# Patient Record
Sex: FEMALE | Race: WHITE | Employment: OTHER | URBAN - METROPOLITAN AREA
[De-identification: names, ages, dates, MRNs, and addresses within clinical notes are randomized per-mention and may not be internally consistent; named-entity substitution may affect disease eponyms.]

---

## 2017-01-08 ENCOUNTER — HOSPITAL ENCOUNTER (EMERGENCY)
Age: 67
Discharge: HOME | End: 2017-01-08
Attending: EMERGENCY MEDICINE
Payer: MEDICARE

## 2017-01-08 ENCOUNTER — APPOINTMENT (OUTPATIENT)
Dept: CT IMAGING | Age: 67
End: 2017-01-08
Attending: EMERGENCY MEDICINE
Payer: MEDICARE

## 2017-01-08 VITALS
HEART RATE: 75 BPM | SYSTOLIC BLOOD PRESSURE: 132 MMHG | TEMPERATURE: 98.2 F | RESPIRATION RATE: 16 BRPM | BODY MASS INDEX: 33.86 KG/M2 | HEIGHT: 62 IN | DIASTOLIC BLOOD PRESSURE: 75 MMHG | OXYGEN SATURATION: 95 % | WEIGHT: 184 LBS

## 2017-01-08 DIAGNOSIS — R10.9 ACUTE LEFT FLANK PAIN: Primary | ICD-10-CM

## 2017-01-08 LAB
ANION GAP BLD CALC-SCNC: 4 MMOL/L (ref 7–16)
BACTERIA URNS QL MICRO: ABNORMAL /HPF
BASOPHILS # BLD AUTO: 0 K/UL (ref 0–0.2)
BASOPHILS # BLD: 0 % (ref 0–2)
BUN SERPL-MCNC: 15 MG/DL (ref 8–23)
CALCIUM SERPL-MCNC: 8.7 MG/DL (ref 8.3–10.4)
CASTS URNS QL MICRO: 0 /LPF
CHLORIDE SERPL-SCNC: 107 MMOL/L (ref 98–107)
CO2 SERPL-SCNC: 28 MMOL/L (ref 21–32)
CREAT SERPL-MCNC: 0.86 MG/DL (ref 0.6–1)
CRYSTALS URNS QL MICRO: 0 /LPF
DIFFERENTIAL METHOD BLD: ABNORMAL
EOSINOPHIL # BLD: 0 K/UL (ref 0–0.8)
EOSINOPHIL NFR BLD: 1 % (ref 0.5–7.8)
EPI CELLS #/AREA URNS HPF: ABNORMAL /HPF
ERYTHROCYTE [DISTWIDTH] IN BLOOD BY AUTOMATED COUNT: 13.9 % (ref 11.9–14.6)
GLUCOSE SERPL-MCNC: 96 MG/DL (ref 65–100)
HCT VFR BLD AUTO: 40.2 % (ref 35.8–46.3)
HGB BLD-MCNC: 13 G/DL (ref 11.7–15.4)
IMM GRANULOCYTES # BLD: 0 K/UL (ref 0–0.5)
IMM GRANULOCYTES NFR BLD AUTO: 0.2 % (ref 0–5)
LYMPHOCYTES # BLD AUTO: 14 % (ref 13–44)
LYMPHOCYTES # BLD: 0.9 K/UL (ref 0.5–4.6)
MCH RBC QN AUTO: 29.5 PG (ref 26.1–32.9)
MCHC RBC AUTO-ENTMCNC: 32.3 G/DL (ref 31.4–35)
MCV RBC AUTO: 91.2 FL (ref 79.6–97.8)
MONOCYTES # BLD: 0.2 K/UL (ref 0.1–1.3)
MONOCYTES NFR BLD AUTO: 3 % (ref 4–12)
MUCOUS THREADS URNS QL MICRO: ABNORMAL /LPF
NEUTS SEG # BLD: 5.2 K/UL (ref 1.7–8.2)
NEUTS SEG NFR BLD AUTO: 82 % (ref 43–78)
PLATELET # BLD AUTO: 249 K/UL (ref 150–450)
PMV BLD AUTO: 11.9 FL (ref 10.8–14.1)
POTASSIUM SERPL-SCNC: 5.3 MMOL/L (ref 3.5–5.1)
RBC # BLD AUTO: 4.41 M/UL (ref 4.05–5.25)
RBC #/AREA URNS HPF: ABNORMAL /HPF
SODIUM SERPL-SCNC: 139 MMOL/L (ref 136–145)
WBC # BLD AUTO: 6.4 K/UL (ref 4.3–11.1)
WBC URNS QL MICRO: ABNORMAL /HPF
YEAST URNS QL MICRO: ABNORMAL

## 2017-01-08 PROCEDURE — 74176 CT ABD & PELVIS W/O CONTRAST: CPT

## 2017-01-08 PROCEDURE — 99284 EMERGENCY DEPT VISIT MOD MDM: CPT | Performed by: EMERGENCY MEDICINE

## 2017-01-08 PROCEDURE — 81003 URINALYSIS AUTO W/O SCOPE: CPT | Performed by: EMERGENCY MEDICINE

## 2017-01-08 PROCEDURE — 80048 BASIC METABOLIC PNL TOTAL CA: CPT | Performed by: EMERGENCY MEDICINE

## 2017-01-08 PROCEDURE — 85025 COMPLETE CBC W/AUTO DIFF WBC: CPT | Performed by: EMERGENCY MEDICINE

## 2017-01-08 PROCEDURE — 81015 MICROSCOPIC EXAM OF URINE: CPT | Performed by: EMERGENCY MEDICINE

## 2017-01-08 RX ORDER — TRAMADOL HYDROCHLORIDE 50 MG/1
50-100 TABLET ORAL
Qty: 11 TAB | Refills: 0 | Status: SHIPPED | OUTPATIENT
Start: 2017-01-08 | End: 2018-06-14

## 2017-01-08 NOTE — ED NOTES
I have reviewed discharge instructions with the patient. The patient verbalized understanding. Ambulatory at discharge gait steady. Given 1 script. Opportunity for questions and clarification provided.

## 2017-01-08 NOTE — DISCHARGE INSTRUCTIONS
As we discussed, we did not have an exact cause of her symptoms today in the emergency department. Therefore, it is important for you to follow up with your primary care doctor for reevaluation. Please return to the emergency department with any fevers, vomiting, bloody or bowels, worsening symptoms, or additional concerns.

## 2017-01-08 NOTE — ED PROVIDER NOTES
HPI Comments: 70-year-old lady with a history of pain in her left flank. Patient says that pain has been present for not quite awake but is gotten significantly worse in the last 24 hours. Patient says she has no known history of kidney stones and no history of significant kidney infections. She notes she does have a remote history of interstitial cystitis but says she has not had any difficulties with that in a few years. She says that with this pain she had no nausea or vomiting. No urinary or stool or blood in her bowels or urine. Overall she rates her pain as an 8 out of 10. Elements of this note were created using speech recognition software. As such, there may be errors of speech recognition present. Patient is a 78 y.o. female presenting with flank pain. The history is provided by the patient. Flank Pain    Pertinent negatives include no chest pain, no fever, no headaches, no abdominal pain, no dysuria and no weakness.         Past Medical History:   Diagnosis Date    Arthritis     Atrial fibrillation      AF despite TIkosyn; AF ablation 9/2013; Recurrent AF-AV tasia ablation 8/2015    Bradycardia     DM (diabetes mellitus) ([facility] [address] 11/3/2015    GERD (gastroesophageal reflux disease)     Heart failure (HCC)      h/o EF <35%, now improved to 55%    History of implantable cardioverter-defibrillator (ICD) placement      [employer] BiV ICD    HLD (hyperlipidemia)     Hypertension     ICD (implantable cardioverter-defibrillator), biventricular, in situ      Dual-chamber ICD in [facility] 2011; RA/RV lead revision/upgrade to BiV 3/2013    Obesity      BMI 33    Psychiatric disorder      anxiety    PVD (peripheral vascular disease) ([facility] [address] 11/3/2015    Syncope and collapse     Vertigo        Past Surgical History:   Procedure Laterality Date    Hx cholecystectomy      Hx tonsillectomy      Hx hysterectomy      Hx pacemaker      Hx implantable cardioverter defibrillator           Family History: Problem Relation Age of Onset    Heart Disease Father      MI at 48yrs    No Known Problems Sister     No Known Problems Brother     No Known Problems Brother        Social History     Social History    Marital status:      Spouse name: N/A    Number of children: N/A    Years of education: N/A     Occupational History    Not on file. Social History Main Topics    Smoking status: Never Smoker    Smokeless tobacco: Never Used    Alcohol use No    Drug use: No    Sexual activity: Not on file     Other Topics Concern    Not on file     Social History Narrative         ALLERGIES: Darvocet a500 [propoxyphene n-acetaminophen] and Zithromax [azithromycin]    Review of Systems   Constitutional: Negative for chills, diaphoresis and fever. HENT: Negative for congestion, rhinorrhea and sore throat. Eyes: Negative for redness and visual disturbance. Respiratory: Negative for cough, chest tightness, shortness of breath and wheezing. Cardiovascular: Negative for chest pain and palpitations. Gastrointestinal: Negative for abdominal pain, blood in stool, diarrhea, nausea and vomiting. Endocrine: Negative for polydipsia and polyuria. Genitourinary: Positive for flank pain. Negative for dysuria and hematuria. Musculoskeletal: Negative for arthralgias, myalgias and neck stiffness. Skin: Negative for rash. Allergic/Immunologic: Negative for environmental allergies and food allergies. Neurological: Negative for dizziness, weakness and headaches. Hematological: Negative for adenopathy. Does not bruise/bleed easily. Psychiatric/Behavioral: Negative for confusion and sleep disturbance. The patient is not nervous/anxious. Vitals:    01/08/17 1322   BP: 134/70   Pulse: 79   Resp: 18   Temp: 98 °F (36.7 °C)   SpO2: 95%   Weight: 83.5 kg (184 lb)   Height: 5' 2\" (1.575 m)            Physical Exam   Constitutional: She is oriented to person, place, and time.  She appears well-developed and well-nourished. HENT:   Head: Normocephalic and atraumatic. Eyes: Conjunctivae and EOM are normal. Pupils are equal, round, and reactive to light. Neck: Normal range of motion. Cardiovascular: Normal rate and regular rhythm. Pulmonary/Chest: Effort normal and breath sounds normal. No respiratory distress. She has no wheezes. She has no rales. She exhibits no tenderness. Abdominal: Soft. Bowel sounds are normal. There is no rebound and no guarding. Musculoskeletal: Normal range of motion. She exhibits no edema or tenderness. Lymphadenopathy:     She has no cervical adenopathy. Neurological: She is alert and oriented to person, place, and time. Skin: Skin is warm and dry. Psychiatric: She has a normal mood and affect. Nursing note and vitals reviewed. MDM  Number of Diagnoses or Management Options  Diagnosis management comments: I will get a CT scan of her abdomen to look for evidence of a kidney stone or kidney problems. We'll also check some basic blood work to check her kidney function. CT, urine, and blood work are unremarkable. I do not think that very slight potassium elevation is clinically relevant. I will plan to discharge the patient home with some pain medicine and I encouraged her to follow up with her primary care doctor.     ED Course       Procedures

## 2017-03-06 ENCOUNTER — ANESTHESIA EVENT (OUTPATIENT)
Dept: SURGERY | Age: 67
End: 2017-03-06
Payer: MEDICARE

## 2017-03-06 RX ORDER — NALOXONE HYDROCHLORIDE 0.4 MG/ML
0.1 INJECTION, SOLUTION INTRAMUSCULAR; INTRAVENOUS; SUBCUTANEOUS AS NEEDED
Status: CANCELLED | OUTPATIENT
Start: 2017-03-06

## 2017-03-06 RX ORDER — SODIUM CHLORIDE 0.9 % (FLUSH) 0.9 %
5-10 SYRINGE (ML) INJECTION AS NEEDED
Status: CANCELLED | OUTPATIENT
Start: 2017-03-06

## 2017-03-06 RX ORDER — OXYCODONE HYDROCHLORIDE 5 MG/1
5 TABLET ORAL
Status: CANCELLED | OUTPATIENT
Start: 2017-03-06

## 2017-03-06 RX ORDER — HYDROMORPHONE HYDROCHLORIDE 2 MG/ML
0.5 INJECTION, SOLUTION INTRAMUSCULAR; INTRAVENOUS; SUBCUTANEOUS
Status: CANCELLED | OUTPATIENT
Start: 2017-03-06

## 2017-03-06 NOTE — PROGRESS NOTES
Patient pre-assessment complete for Kelly Cannon  scheduled for Bi V ICD gen chane, arrival time 0800 am. Patient verified using . Patient instructed to bring all home medications in labeled bottles on the day of procedure. NPO status reinforced. . Patient instructed to HOLD Coumadin. Instructed they can take all other medications excluding vitamins & supplements. Patient verbalizes understanding of all instructions & denies any questions at this time.

## 2017-03-07 ENCOUNTER — HOSPITAL ENCOUNTER (OUTPATIENT)
Age: 67
Setting detail: OUTPATIENT SURGERY
Discharge: HOME | End: 2017-03-07
Attending: INTERNAL MEDICINE | Admitting: INTERNAL MEDICINE
Payer: MEDICARE

## 2017-03-07 ENCOUNTER — SURGERY (OUTPATIENT)
Age: 67
End: 2017-03-07

## 2017-03-07 ENCOUNTER — ANESTHESIA (OUTPATIENT)
Dept: SURGERY | Age: 67
End: 2017-03-07
Payer: MEDICARE

## 2017-03-07 ENCOUNTER — APPOINTMENT (OUTPATIENT)
Dept: CARDIAC CATH/INVASIVE PROCEDURES | Age: 67
End: 2017-03-07
Payer: MEDICARE

## 2017-03-07 VITALS
BODY MASS INDEX: 33.13 KG/M2 | DIASTOLIC BLOOD PRESSURE: 72 MMHG | OXYGEN SATURATION: 97 % | TEMPERATURE: 97.6 F | HEIGHT: 62 IN | SYSTOLIC BLOOD PRESSURE: 145 MMHG | RESPIRATION RATE: 9 BRPM | WEIGHT: 180 LBS | HEART RATE: 60 BPM

## 2017-03-07 LAB
ANION GAP BLD CALC-SCNC: 11 MMOL/L (ref 7–16)
ATRIAL RATE: 66 BPM
BUN SERPL-MCNC: 9 MG/DL (ref 8–23)
CALCIUM SERPL-MCNC: 8.8 MG/DL (ref 8.3–10.4)
CALCULATED P AXIS, ECG09: 62 DEGREES
CALCULATED R AXIS, ECG10: -90 DEGREES
CALCULATED T AXIS, ECG11: 61 DEGREES
CHLORIDE SERPL-SCNC: 104 MMOL/L (ref 98–107)
CO2 SERPL-SCNC: 27 MMOL/L (ref 21–32)
CREAT SERPL-MCNC: 0.73 MG/DL (ref 0.6–1)
DIAGNOSIS, 93000: NORMAL
DIASTOLIC BP, ECG02: NORMAL MMHG
ERYTHROCYTE [DISTWIDTH] IN BLOOD BY AUTOMATED COUNT: 14.3 % (ref 11.9–14.6)
GLUCOSE SERPL-MCNC: 99 MG/DL (ref 65–100)
HCT VFR BLD AUTO: 38.3 % (ref 35.8–46.3)
HGB BLD-MCNC: 12.4 G/DL (ref 11.7–15.4)
INR PPP: 1.2 (ref 0.9–1.2)
MAGNESIUM SERPL-MCNC: 2 MG/DL (ref 1.8–2.4)
MCH RBC QN AUTO: 29.7 PG (ref 26.1–32.9)
MCHC RBC AUTO-ENTMCNC: 32.4 G/DL (ref 31.4–35)
MCV RBC AUTO: 91.8 FL (ref 79.6–97.8)
P-R INTERVAL, ECG05: 176 MS
PLATELET # BLD AUTO: 232 K/UL (ref 150–450)
PMV BLD AUTO: 11.2 FL (ref 10.8–14.1)
POTASSIUM SERPL-SCNC: 4.2 MMOL/L (ref 3.5–5.1)
PROTHROMBIN TIME: 12.7 SEC (ref 9.6–12)
Q-T INTERVAL, ECG07: 474 MS
QRS DURATION, ECG06: 128 MS
QTC CALCULATION (BEZET), ECG08: 496 MS
RBC # BLD AUTO: 4.17 M/UL (ref 4.05–5.25)
SODIUM SERPL-SCNC: 142 MMOL/L (ref 136–145)
SYSTOLIC BP, ECG01: NORMAL MMHG
VENTRICULAR RATE, ECG03: 66 BPM
WBC # BLD AUTO: 6.3 K/UL (ref 4.3–11.1)

## 2017-03-07 PROCEDURE — 74011250636 HC RX REV CODE- 250/636: Performed by: ANESTHESIOLOGY

## 2017-03-07 PROCEDURE — 77030012935 HC DRSG AQUACEL BMS -B

## 2017-03-07 PROCEDURE — 93641 EP EVL 1/2CHMB PAC CVDFB TST: CPT

## 2017-03-07 PROCEDURE — 74011250636 HC RX REV CODE- 250/636: Performed by: INTERNAL MEDICINE

## 2017-03-07 PROCEDURE — 93005 ELECTROCARDIOGRAM TRACING: CPT | Performed by: INTERNAL MEDICINE

## 2017-03-07 PROCEDURE — 74011250636 HC RX REV CODE- 250/636

## 2017-03-07 PROCEDURE — 74011000250 HC RX REV CODE- 250: Performed by: INTERNAL MEDICINE

## 2017-03-07 PROCEDURE — 74011000272 HC RX REV CODE- 272: Performed by: INTERNAL MEDICINE

## 2017-03-07 PROCEDURE — 85610 PROTHROMBIN TIME: CPT | Performed by: INTERNAL MEDICINE

## 2017-03-07 PROCEDURE — 77030028698 HC BLD TISS PLSM MEDT -D

## 2017-03-07 PROCEDURE — 76060000032 HC ANESTHESIA 0.5 TO 1 HR: Performed by: INTERNAL MEDICINE

## 2017-03-07 PROCEDURE — 85027 COMPLETE CBC AUTOMATED: CPT | Performed by: INTERNAL MEDICINE

## 2017-03-07 PROCEDURE — 83735 ASSAY OF MAGNESIUM: CPT | Performed by: INTERNAL MEDICINE

## 2017-03-07 PROCEDURE — 77030031139 HC SUT VCRL2 J&J -A

## 2017-03-07 PROCEDURE — 74011000250 HC RX REV CODE- 250

## 2017-03-07 PROCEDURE — 33264 RMVL & RPLCMT DFB GEN MLT LD: CPT

## 2017-03-07 PROCEDURE — 77030008467 HC STPLR SKN COVD -B

## 2017-03-07 PROCEDURE — C1882 AICD, OTHER THAN SING/DUAL: HCPCS

## 2017-03-07 PROCEDURE — 80048 BASIC METABOLIC PNL TOTAL CA: CPT | Performed by: INTERNAL MEDICINE

## 2017-03-07 RX ORDER — PROPOFOL 10 MG/ML
INJECTION, EMULSION INTRAVENOUS
Status: DISCONTINUED | OUTPATIENT
Start: 2017-03-07 | End: 2017-03-07 | Stop reason: HOSPADM

## 2017-03-07 RX ORDER — FAMOTIDINE 20 MG/1
20 TABLET, FILM COATED ORAL ONCE
Status: DISCONTINUED | OUTPATIENT
Start: 2017-03-07 | End: 2017-03-07 | Stop reason: HOSPADM

## 2017-03-07 RX ORDER — LIDOCAINE HYDROCHLORIDE 10 MG/ML
0.1 INJECTION INFILTRATION; PERINEURAL AS NEEDED
Status: DISCONTINUED | OUTPATIENT
Start: 2017-03-07 | End: 2017-03-07 | Stop reason: HOSPADM

## 2017-03-07 RX ORDER — SODIUM CHLORIDE, SODIUM LACTATE, POTASSIUM CHLORIDE, CALCIUM CHLORIDE 600; 310; 30; 20 MG/100ML; MG/100ML; MG/100ML; MG/100ML
100 INJECTION, SOLUTION INTRAVENOUS CONTINUOUS
Status: DISCONTINUED | OUTPATIENT
Start: 2017-03-07 | End: 2017-03-07 | Stop reason: HOSPADM

## 2017-03-07 RX ORDER — MIDAZOLAM HYDROCHLORIDE 1 MG/ML
2 INJECTION, SOLUTION INTRAMUSCULAR; INTRAVENOUS
Status: DISCONTINUED | OUTPATIENT
Start: 2017-03-07 | End: 2017-03-07 | Stop reason: HOSPADM

## 2017-03-07 RX ORDER — SODIUM CHLORIDE 9 MG/ML
25 INJECTION, SOLUTION INTRAVENOUS CONTINUOUS
Status: DISCONTINUED | OUTPATIENT
Start: 2017-03-07 | End: 2017-03-07 | Stop reason: HOSPADM

## 2017-03-07 RX ORDER — PROPOFOL 10 MG/ML
INJECTION, EMULSION INTRAVENOUS AS NEEDED
Status: DISCONTINUED | OUTPATIENT
Start: 2017-03-07 | End: 2017-03-07 | Stop reason: HOSPADM

## 2017-03-07 RX ORDER — LIDOCAINE HYDROCHLORIDE 20 MG/ML
INJECTION, SOLUTION EPIDURAL; INFILTRATION; INTRACAUDAL; PERINEURAL AS NEEDED
Status: DISCONTINUED | OUTPATIENT
Start: 2017-03-07 | End: 2017-03-07

## 2017-03-07 RX ORDER — CEFAZOLIN SODIUM IN 0.9 % NACL 2 G/50 ML
2 INTRAVENOUS SOLUTION, PIGGYBACK (ML) INTRAVENOUS ONCE
Status: COMPLETED | OUTPATIENT
Start: 2017-03-07 | End: 2017-03-07

## 2017-03-07 RX ORDER — LIDOCAINE HYDROCHLORIDE 10 MG/ML
40 INJECTION INFILTRATION; PERINEURAL ONCE
Status: COMPLETED | OUTPATIENT
Start: 2017-03-07 | End: 2017-03-07

## 2017-03-07 RX ORDER — FENTANYL CITRATE 50 UG/ML
100 INJECTION, SOLUTION INTRAMUSCULAR; INTRAVENOUS ONCE
Status: DISCONTINUED | OUTPATIENT
Start: 2017-03-07 | End: 2017-03-07 | Stop reason: HOSPADM

## 2017-03-07 RX ORDER — LORAZEPAM 0.5 MG/1
TABLET ORAL
COMMUNITY

## 2017-03-07 RX ORDER — SODIUM CHLORIDE, SODIUM LACTATE, POTASSIUM CHLORIDE, CALCIUM CHLORIDE 600; 310; 30; 20 MG/100ML; MG/100ML; MG/100ML; MG/100ML
75 INJECTION, SOLUTION INTRAVENOUS CONTINUOUS
Status: DISCONTINUED | OUTPATIENT
Start: 2017-03-07 | End: 2017-03-07 | Stop reason: HOSPADM

## 2017-03-07 RX ORDER — SODIUM CHLORIDE 0.9 % (FLUSH) 0.9 %
5-10 SYRINGE (ML) INJECTION AS NEEDED
Status: DISCONTINUED | OUTPATIENT
Start: 2017-03-07 | End: 2017-03-07 | Stop reason: HOSPADM

## 2017-03-07 RX ORDER — SODIUM CHLORIDE 0.9 % (FLUSH) 0.9 %
5-10 SYRINGE (ML) INJECTION EVERY 8 HOURS
Status: DISCONTINUED | OUTPATIENT
Start: 2017-03-07 | End: 2017-03-07 | Stop reason: HOSPADM

## 2017-03-07 RX ORDER — LIDOCAINE HYDROCHLORIDE 20 MG/ML
INJECTION, SOLUTION EPIDURAL; INFILTRATION; INTRACAUDAL; PERINEURAL AS NEEDED
Status: DISCONTINUED | OUTPATIENT
Start: 2017-03-07 | End: 2017-03-07 | Stop reason: HOSPADM

## 2017-03-07 RX ADMIN — PROPOFOL 100 MCG/KG/MIN: 10 INJECTION, EMULSION INTRAVENOUS at 13:08

## 2017-03-07 RX ADMIN — CEFAZOLIN 2 G: 1 INJECTION, POWDER, FOR SOLUTION INTRAMUSCULAR; INTRAVENOUS; PARENTERAL at 13:00

## 2017-03-07 RX ADMIN — CEFAZOLIN 2 G: 1 INJECTION, POWDER, FOR SOLUTION INTRAMUSCULAR; INTRAVENOUS; PARENTERAL at 16:00

## 2017-03-07 RX ADMIN — NEOMYCIN AND POLYMYXIN B SULFATES: 40; 200000 IRRIGANT IRRIGATION at 09:00

## 2017-03-07 RX ADMIN — LIDOCAINE HYDROCHLORIDE 40 ML: 10 INJECTION, SOLUTION INFILTRATION; PERINEURAL at 09:00

## 2017-03-07 RX ADMIN — PROPOFOL 50 MG: 10 INJECTION, EMULSION INTRAVENOUS at 13:07

## 2017-03-07 RX ADMIN — PROPOFOL 30 MG: 10 INJECTION, EMULSION INTRAVENOUS at 13:16

## 2017-03-07 RX ADMIN — SODIUM CHLORIDE, SODIUM LACTATE, POTASSIUM CHLORIDE, AND CALCIUM CHLORIDE: 600; 310; 30; 20 INJECTION, SOLUTION INTRAVENOUS at 13:00

## 2017-03-07 RX ADMIN — LIDOCAINE HYDROCHLORIDE 60 MG: 20 INJECTION, SOLUTION EPIDURAL; INFILTRATION; INTRACAUDAL; PERINEURAL at 13:07

## 2017-03-07 RX ADMIN — MIDAZOLAM HYDROCHLORIDE 2 MG: 1 INJECTION, SOLUTION INTRAMUSCULAR; INTRAVENOUS at 12:50

## 2017-03-07 NOTE — ANESTHESIA PREPROCEDURE EVALUATION
Anesthetic History               Review of Systems / Medical History  Patient summary reviewed    Pulmonary                   Neuro/Psych              Cardiovascular    Hypertension      CHF (EF 35%)  Dysrhythmias : atrial fibrillation      Exercise tolerance: >4 METS     GI/Hepatic/Renal     GERD: well controlled           Endo/Other    Diabetes: type 2         Other Findings              Physical Exam    Airway  Mallampati: II    Neck ROM: normal range of motion   Mouth opening: Normal     Cardiovascular  Regular rate and rhythm,  S1 and S2 normal,  no murmur, click, rub, or gallop             Dental    Dentition: Edentulous     Pulmonary  Breath sounds clear to auscultation               Abdominal         Other Findings            Anesthetic Plan    ASA: 4  Anesthesia type: total IV anesthesia            Anesthetic plan and risks discussed with: Patient

## 2017-03-07 NOTE — PROGRESS NOTES
Assisted OOB & ambulated to BR & on unit. Tolerated activity without difficulty.  Left upper chest without bleeding or hematoma

## 2017-03-07 NOTE — PROGRESS NOTES
Patient received to [address] [place] [address] room # 1  Ambulatory from [facility]. Patient scheduled for generator change today with Dr Ida Cordero. Procedure reviewed & questions answered, voiced good understanding consent obtained & placed on chart. All medications and medical history reviewed. Will prep patient per orders. Patient & family updated on plan of care.

## 2017-03-07 NOTE — ANESTHESIA POSTPROCEDURE EVALUATION
Post-Anesthesia Evaluation and Assessment    Patient: Joshua Perezopshire MRN: [id number]  SSN: [id number]    YOB: 1950  Age: 78 y.o. Sex: female       Cardiovascular Function/Vital Signs  Visit Vitals    /75    Pulse 68    Temp 36.4 °C (97.6 °F)    Resp 21    Ht 5' 2\" (1.575 m)    Wt 81.6 kg (180 lb)    SpO2 95%    Breastfeeding No    BMI 32.92 kg/m2       Patient is status post total IV anesthesia anesthesia for Procedure(s):  BIV ICD GENERATOR CHANGE. Nausea/Vomiting: None    Postoperative hydration reviewed and adequate. Pain:  Pain Scale 1: Numeric (0 - 10) (03/07/17 1600)  Pain Intensity 1: 0 (03/07/17 1600)   Managed    Neurological Status: At baseline    Mental Status and Level of Consciousness: Arousable    Pulmonary Status:   O2 Device: Room air (03/07/17 1600)   Adequate oxygenation and airway patent    Complications related to anesthesia: None    Post-anesthesia assessment completed.  No concerns    Signed By: Daja Sands MD     March 7, 2017

## 2017-03-07 NOTE — PROGRESS NOTES
Discharge instructions given per orders, voiced good understanding of pos procedure care, medications & follow up care.  Denies any questions

## 2017-03-07 NOTE — DISCHARGE INSTRUCTIONS
PACEMAKER INSTRUCTIONS SHEET  · Keep your incision dry for 10 days. DO NOT put salves, ointments, and/or lotions on the incision. Only take a tub bath during this time; NO showers. · The pieces of tape on the incision will come off by themselves when you begin washing the site. Please do not pull or tear them off. · You may use your pacemaker arm; but DO NOT raise the arm higher than your shoulder for the first two weeks to prevent the pacemaker lead from moving. DO NOT immobilize your pacemaker arm. · Call us IMMEDIATELY if you develop fever, pain, redness, and/or drainage at the pacemaker arm. · Do not lift more than 10 pounds for 2 weeks and 20 pounds for 1 month. · Microwaves WILL NOT harm your pacemaker. Warning does not apply to you. · Avoid activities which can reprogram your pacemaker such as arc welding, ham radios, and tanning booths. At airports, always show your pacemaker identification card. You may walk through the metal detector, but do not allow the hand held wand near your pacemaker. Do not have a MRI or NMR scan without talking to your cardiologist.  · Your pacemakers function will be evaluated over the telephone. Within 3 weeks you should receive a schedule. · Remove the battery from the transmitter after each use. Always keep a spare 9 volt battery. · The pacemaker battery is tested by the heart rate with the magnet applied. This test is done each time you transmit your ECG so it is very important that you follow your schedule. · Carry your pacemaker identification card at all times. Within 6 weeks, you should receive your permanent card. Keep your temporary card as a spare. · Always show the doctor or dentist your pacemaker identification card. · If you have questions about your pacemaker or office appointment, please call the office of [facility] at [phone]. · Following the pacemaker implant, you will need to return to the clinic to see your doctor within 1 week.

## 2017-03-07 NOTE — PROGRESS NOTES
TRANSFER - IN REPORT:    Verbal report received from Adam York RN(name) on Paloma Fuentes  being received from EP lab(unit) for routine progression of care      Report consisted of patients Situation, Background, Assessment and   Recommendations(SBAR). Information from the following report(s) Procedure Summary was reviewed with the receiving nurse. Opportunity for questions and clarification was provided. Assessment completed upon patients arrival to unit and care assumed.

## 2017-03-07 NOTE — PROCEDURES
Procedure: BiV ICD Generator Removal/Replacement without DFTs    Pre-Procedure Diagnosis  1. BiV ICD Battery malfunction  2. NHYA class II  3. Paroxysmal atrial fibrillation    Procedure Performed  1. Insertion of [employer] biventricular implantable cardioverter defibrillator. Anesthesia: MAC     Estimated Blood Loss: Less than 10 mL     Specimens: * No specimens in log *     Patient Information and Indications: The procedure, indications, risks, benefits, and alternatives were discussed with the patient and family members, who desired to proceed after questions were answered and informed consent was documented. Methods: After informed consent was obtained, the patient was brought to the Electrophysiology Laboratory in a fasting state and was prepped and draped in sterile fashion. Prophylactic antibiotic was administered prior to skin incision: (Ancef 2gm). Conscious sedation was administered with continuous oxygen saturation measurement and blood pressure measurement by Anesthesia. Local anesthetic (lidocaine) was delivered to the left pectoral region and an incision was made parallel to the deltopectoral groove directly over the prior surgical scar. The subcutaneous pocket was opened using blunt dissection and electrocautery, and adequate hemostasis was established. The device was freed from overlying fibrotic tissue and the leads freed to give enough slack for device exchange. The leads were individually removed from the old generator and tested using the PSA revealing excellent pacing/sensing parameters. The lead pins were then cleaned with antibiotic soaked gauze, dried gently, and attached to a new biventricular ICD generator. Pins were directly observed to pass the tip electrode, and the ring hex wrench screws were secured, and leads tug tested. The device and leads were gently positioned within the pocket. The pocket was irrigated copiously with a saline antimicrobial solution.  The device was and leads were tested a second time prior to pocket closure. The wound was closed with multiple layers of absorbable suture followed by skin closure with staples. The patient tolerated the procedure well and left the lab in good condition. Lead Data:    Device and Lead Information  Pulse Generator Model #  Serial # Location Implant   [id number] [facility] [id number] Left Pectoral Chronic  03/25/2013   [id number] [facility] [id number] Left Pectoral Implant     Lead Model Number  Serial Number Lead position Implant   RA [id number] [facility] [id number] RA [place] Chronic  03/25/2013   RV [id number]/52 [facility] [id number] RV [place] Chronic  03/25/2013   LV 1458Q/75 [facility] [id number] LCV Chronic  03/25/2013     Lead Sensitivity and Threshold  Lead R or P sensitivity (mv) Threshold (V) Threshold PW (msec) Impedance (ohms) Final output Voltage (V) Final PW (msec)   RA 2.0 0.75 0.50 340 2.0 0.50   RV - 1.0 0.50 390 2.0 0.50   LV - 1.0 1.0 1.0 2.0 1.0     Bradycardia Settings  Bryan Mode LRL URL Pace AVD (ms) Sense AVD (ms) Rate Response Mode Switching Mode SW Rate   DDD 60 120 180 150 On On 180       Tachycardia Settings  Zone Type VT-1 VT-2 VF   ON/OFF/  MONITOR MONITOR OFF ON   Zone Rate 141 181 222   1st Therapy Type  ATP  burst x3 Shock   Energy (J)   30   2nd Therapy Type  Shock  Shock   Energy (J)  25 40   3rd Therapy Type  Shock Shock   Energy (J)  36 40   4th Therapy Type  Shock Shock   Energy (J)  40 40   5th Therapy Type   Shock   Energy (J)   40   6th Therapy Type   Shock   Energy (J)   40     Defibrillation Threshold Testing  DFT# How induced Successful test? Shock Imped (ohms) Energy (J) Charge time (sec) Rescue needed? Defib threshold (J)               Contrast: 0 ml    Fluoro Time:   0 minutes    Complications: None    IMPRESSION: 1) Successful BiV ICD generator replacement without DFTs. Maranda Yan MD, MS  Clinical Cardiac Electrophysiology  3/7/2017  1:48 PM

## 2017-03-07 NOTE — IP AVS SNAPSHOT
Kathy Dela Cruz 
 
 
 [address] [facility] [address] [facility] 
[phone] Patient: Darin Baptiste MRN: [id number] :1950 Discharge Summary 3/7/2017 Darin Baptiste MRN[de-identified]  [id number] Admission Information Provider Pager Service Admission Date Expected D/C Date Giulia Rodríguez MD  CARDIAC CATH LAB 3/7/2017 3/7/2017 Actual LOS Patient Class 0 days OUTPATIENT SURGERY Follow-up Information Follow up With Details Comments Contact Info Giulia Rodríguez MD  A follow up appointment has been scheduled for you for  at 11:00am in the device clinic with Dr. Janee Soto. [address] [place] [address] 
[phone] Current Discharge Medication List  
  
CONTINUE these medications which have NOT CHANGED Dose & Instructions Dispensing Information Comments Morning Noon Evening Bedtime CALTRATE 600+D PLUS MINERALS 600 mg calcium- 400 unit Tab Generic drug:  Calcium Carbonate-Vit D3-Min Your next dose is: Today, Tomorrow Other:  _________ Take  by mouth daily. Refills:  0  
     
   
   
   
  
 cholecalciferol (VITAMIN D3) 5,000 unit Tab tablet Commonly known as:  VITAMIN D3 Your next dose is: Today, Tomorrow Other:  _________ Take  by mouth daily. Refills:  0 COREG 25 mg tablet Generic drug:  carvedilol Your next dose is: Today, Tomorrow Other:  _________ Dose:  25 mg Take 25 mg by mouth two (2) times a day. Refills:  0  
     
   
   
   
  
 COUMADIN 5 mg tablet Generic drug:  warfarin Your next dose is: Today, Tomorrow Other:  _________ Dose:  5 mg Take 5 mg by mouth five (5) days a week. Wednesday, Thursday, Fri, Sat,  Refills:  0 ESTRACE 1 mg tablet Generic drug:  estradiol Your next dose is: Today, Tomorrow Other:  _________ Dose:  2 mg Take 2 mg by mouth daily. Refills:  0 FLUoxetine 10 mg tablet Commonly known as:  PROzac Your next dose is: Today, Tomorrow Other:  _________ Dose:  10 mg Take 10 mg by mouth daily. Refills:  0  
     
   
   
   
  
 loratadine 10 mg tablet Commonly known as:  Quiana Schuler Your next dose is: Today, Tomorrow Other:  _________ Dose:  10 mg Take 10 mg by mouth. Refills:  0 LORazepam 0.5 mg tablet Commonly known as:  ATIVAN Your next dose is: Today, Tomorrow Other:  _________ Take  by mouth. Refills:  0  
     
   
   
   
  
 lovastatin 20 mg tablet Commonly known as:  MEVACOR Your next dose is: Today, Tomorrow Other:  _________ Dose:  40 mg Take 40 mg by mouth daily. Refills:  0  
     
   
   
   
  
 magnesium oxide 400 mg tablet Commonly known as:  MAG-OX Your next dose is: Today, Tomorrow Other:  _________ Dose:  400 mg Take 400 mg by mouth two (2) times a day. Refills:  0  
     
   
   
   
  
 nitroglycerin 0.4 mg SL tablet Commonly known as:  NITROSTAT Your next dose is: Today, Tomorrow Other:  _________ Dose:  0.4 mg  
0.4 mg by SubLINGual route every five (5) minutes as needed. Refills:  0  
     
   
   
   
  
 omeprazole 40 mg capsule Commonly known as:  PRILOSEC Your next dose is: Today, Tomorrow Other:  _________ Dose:  40 mg Take 40 mg by mouth daily. Refills:  0 SINGULAIR 10 mg tablet Generic drug:  montelukast  
   
Your next dose is: Today, Tomorrow Other:  _________ Dose:  10 mg Take 10 mg by mouth daily. Refills:  0  
     
   
   
   
  
 temazepam 15 mg capsule Commonly known as:  RESTORIL  
   
 Your next dose is: Today, Tomorrow Other:  _________ Dose:  15 mg Take 15 mg by mouth nightly as needed. Refills:  0  
     
   
   
   
  
 traMADol 50 mg tablet Commonly known as:  ULTRAM  
   
Your next dose is: Today, Tomorrow Other:  _________ Dose:   mg Take 1-2 Tabs by mouth every eight (8) hours as needed for Pain. Max Daily Amount: 300 mg. Quantity:  11 Tab Refills:  0  
     
   
   
   
  
 VITAMIN B-12 500 mcg tablet Generic drug:  cyanocobalamin Your next dose is: Today, Tomorrow Other:  _________ Dose:  500 mcg Take 500 mcg by mouth. Refills:  0 General Information Please provide this summary of care documentation to your next provider. Allergies Unspecified:  Darvocet A500 [Propoxyphene N-acetaminophen]; Zithromax [Azithromycin] Current Immunizations  Never Reviewed No immunizations on file. Discharge Instructions Discharge Instructions PACEMAKER INSTRUCTIONS SHEET 
· Keep your incision dry for 10 days. DO NOT put salves, ointments, and/or lotions on the incision. Only take a tub bath during this time; NO showers. · The pieces of tape on the incision will come off by themselves when you begin washing the site. Please do not pull or tear them off. · You may use your pacemaker arm; but DO NOT raise the arm higher than your shoulder for the first two weeks to prevent the pacemaker lead from moving. DO NOT immobilize your pacemaker arm. · Call us IMMEDIATELY if you develop fever, pain, redness, and/or drainage at the pacemaker arm. · Do not lift more than 10 pounds for 2 weeks and 20 pounds for 1 month. · Microwaves WILL NOT harm your pacemaker. Warning does not apply to you. · Avoid activities which can reprogram your pacemaker such as arc welding, ham radios, and tanning booths.  At airports, always show your pacemaker identification card. You may walk through the metal detector, but do not allow the hand held wand near your pacemaker. Do not have a MRI or NMR scan without talking to your cardiologist. 
· Your pacemakers function will be evaluated over the telephone. Within 3 weeks you should receive a schedule. · Remove the battery from the transmitter after each use. Always keep a spare 9 volt battery. · The pacemaker battery is tested by the heart rate with the magnet applied. This test is done each time you transmit your ECG so it is very important that you follow your schedule. · Carry your pacemaker identification card at all times. Within 6 weeks, you should receive your permanent card. Keep your temporary card as a spare. · Always show the doctor or dentist your pacemaker identification card. · If you have questions about your pacemaker or office appointment, please call the office of [facility] at [phone]. · Following the pacemaker implant, you will need to return to the clinic to see your doctor within 1 week. Discharge Orders None  
  
` Patient Signature:  ____________________________________________________________ Date:  ____________________________________________________________  
  
 Julian Salas Provider Signature:  ____________________________________________________________ Date:  ____________________________________________________________

## 2018-05-24 PROBLEM — I48.19 PERSISTENT ATRIAL FIBRILLATION (HCC): Status: ACTIVE | Noted: 2018-05-24

## 2018-05-24 PROBLEM — I47.29 NSVT (NONSUSTAINED VENTRICULAR TACHYCARDIA): Status: ACTIVE | Noted: 2018-05-24

## 2018-06-09 ENCOUNTER — APPOINTMENT (OUTPATIENT)
Dept: GENERAL RADIOLOGY | Age: 68
DRG: 552 | End: 2018-06-09
Attending: EMERGENCY MEDICINE
Payer: MEDICARE

## 2018-06-09 ENCOUNTER — APPOINTMENT (OUTPATIENT)
Dept: CT IMAGING | Age: 68
DRG: 552 | End: 2018-06-09
Attending: EMERGENCY MEDICINE
Payer: MEDICARE

## 2018-06-09 ENCOUNTER — HOSPITAL ENCOUNTER (INPATIENT)
Age: 68
LOS: 5 days | Discharge: HOME HEALTH | DRG: 552 | End: 2018-06-14
Attending: EMERGENCY MEDICINE | Admitting: SURGERY
Payer: MEDICARE

## 2018-06-09 DIAGNOSIS — T07.XXXA MULTIPLE TRAUMA: ICD-10-CM

## 2018-06-09 DIAGNOSIS — S22.080A T12 COMPRESSION FRACTURE (HCC): ICD-10-CM
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
## 2018-06-09 DIAGNOSIS — S30.1XXA CONTUSION OF ABDOMINAL WALL, INITIAL ENCOUNTER: ICD-10-CM

## 2018-06-09 LAB
ABO + RH BLD: NORMAL
ALBUMIN SERPL-MCNC: 3 G/DL (ref 3.2–4.6)
ALBUMIN/GLOB SERPL: 0.9 {RATIO} (ref 1.2–3.5)
ALP SERPL-CCNC: 80 U/L (ref 50–136)
ALT SERPL-CCNC: 20 U/L (ref 12–65)
ANION GAP SERPL CALC-SCNC: 7 MMOL/L (ref 7–16)
AST SERPL-CCNC: 22 U/L (ref 15–37)
BASOPHILS # BLD: 0 K/UL (ref 0–0.2)
BASOPHILS NFR BLD: 0 % (ref 0–2)
BILIRUB SERPL-MCNC: 0.3 MG/DL (ref 0.2–1.1)
BLOOD GROUP ANTIBODIES SERPL: NORMAL
BUN SERPL-MCNC: 17 MG/DL (ref 8–23)
CALCIUM SERPL-MCNC: 8.7 MG/DL (ref 8.3–10.4)
CHLORIDE SERPL-SCNC: 106 MMOL/L (ref 98–107)
CO2 SERPL-SCNC: 27 MMOL/L (ref 21–32)
CREAT SERPL-MCNC: 0.91 MG/DL (ref 0.6–1)
DIFFERENTIAL METHOD BLD: ABNORMAL
EOSINOPHIL # BLD: 0 K/UL (ref 0–0.8)
EOSINOPHIL NFR BLD: 0 % (ref 0.5–7.8)
ERYTHROCYTE [DISTWIDTH] IN BLOOD BY AUTOMATED COUNT: 14.4 % (ref 11.9–14.6)
GLOBULIN SER CALC-MCNC: 3.4 G/DL (ref 2.3–3.5)
GLUCOSE BLD STRIP.AUTO-MCNC: 121 MG/DL (ref 65–100)
GLUCOSE SERPL-MCNC: 102 MG/DL (ref 65–100)
HCT VFR BLD AUTO: 37 % (ref 35.8–46.3)
HGB BLD-MCNC: 12.4 G/DL (ref 11.7–15.4)
IMM GRANULOCYTES # BLD: 0.1 K/UL (ref 0–0.5)
IMM GRANULOCYTES NFR BLD AUTO: 1 % (ref 0–5)
INR PPP: 1.5
LIPASE SERPL-CCNC: 71 U/L (ref 73–393)
LYMPHOCYTES # BLD: 0.8 K/UL (ref 0.5–4.6)
LYMPHOCYTES NFR BLD: 6 % (ref 13–44)
MCH RBC QN AUTO: 30.3 PG (ref 26.1–32.9)
MCHC RBC AUTO-ENTMCNC: 33.5 G/DL (ref 31.4–35)
MCV RBC AUTO: 90.5 FL (ref 79.6–97.8)
MONOCYTES # BLD: 0.9 K/UL (ref 0.1–1.3)
MONOCYTES NFR BLD: 6 % (ref 4–12)
NEUTS SEG # BLD: 13.2 K/UL (ref 1.7–8.2)
NEUTS SEG NFR BLD: 87 % (ref 43–78)
PLATELET # BLD AUTO: 240 K/UL (ref 150–450)
PMV BLD AUTO: 10.5 FL (ref 10.8–14.1)
POTASSIUM SERPL-SCNC: 3.9 MMOL/L (ref 3.5–5.1)
PROT SERPL-MCNC: 6.4 G/DL (ref 6.3–8.2)
PROTHROMBIN TIME: 17.2 SEC (ref 11.5–14.5)
RBC # BLD AUTO: 4.09 M/UL (ref 4.05–5.25)
SODIUM SERPL-SCNC: 140 MMOL/L (ref 136–145)
SPECIMEN EXP DATE BLD: NORMAL
WBC # BLD AUTO: 15.1 K/UL (ref 4.3–11.1)

## 2018-06-09 PROCEDURE — 74011250636 HC RX REV CODE- 250/636: Performed by: SURGERY

## 2018-06-09 PROCEDURE — 82962 GLUCOSE BLOOD TEST: CPT

## 2018-06-09 PROCEDURE — 93005 ELECTROCARDIOGRAM TRACING: CPT | Performed by: EMERGENCY MEDICINE

## 2018-06-09 PROCEDURE — 80053 COMPREHEN METABOLIC PANEL: CPT | Performed by: EMERGENCY MEDICINE

## 2018-06-09 PROCEDURE — 65620000000 HC RM CCU GENERAL

## 2018-06-09 PROCEDURE — 82565 ASSAY OF CREATININE: CPT

## 2018-06-09 PROCEDURE — 74011000258 HC RX REV CODE- 258: Performed by: EMERGENCY MEDICINE

## 2018-06-09 PROCEDURE — 83690 ASSAY OF LIPASE: CPT | Performed by: EMERGENCY MEDICINE

## 2018-06-09 PROCEDURE — 99285 EMERGENCY DEPT VISIT HI MDM: CPT | Performed by: EMERGENCY MEDICINE

## 2018-06-09 PROCEDURE — 85025 COMPLETE CBC W/AUTO DIFF WBC: CPT | Performed by: EMERGENCY MEDICINE

## 2018-06-09 PROCEDURE — 73130 X-RAY EXAM OF HAND: CPT

## 2018-06-09 PROCEDURE — 86900 BLOOD TYPING SEROLOGIC ABO: CPT | Performed by: EMERGENCY MEDICINE

## 2018-06-09 PROCEDURE — 96374 THER/PROPH/DIAG INJ IV PUSH: CPT | Performed by: EMERGENCY MEDICINE

## 2018-06-09 PROCEDURE — 85610 PROTHROMBIN TIME: CPT | Performed by: EMERGENCY MEDICINE

## 2018-06-09 PROCEDURE — 74011250636 HC RX REV CODE- 250/636: Performed by: EMERGENCY MEDICINE

## 2018-06-09 PROCEDURE — 74011636320 HC RX REV CODE- 636/320: Performed by: EMERGENCY MEDICINE

## 2018-06-09 PROCEDURE — 96361 HYDRATE IV INFUSION ADD-ON: CPT | Performed by: EMERGENCY MEDICINE

## 2018-06-09 PROCEDURE — 70450 CT HEAD/BRAIN W/O DYE: CPT

## 2018-06-09 PROCEDURE — 74177 CT ABD & PELVIS W/CONTRAST: CPT

## 2018-06-09 PROCEDURE — 85018 HEMOGLOBIN: CPT | Performed by: SURGERY

## 2018-06-09 PROCEDURE — 77010033678 HC OXYGEN DAILY

## 2018-06-09 PROCEDURE — 72125 CT NECK SPINE W/O DYE: CPT

## 2018-06-09 PROCEDURE — 77030032490 HC SLV COMPR SCD KNE COVD -B

## 2018-06-09 RX ORDER — HYDROMORPHONE HYDROCHLORIDE 1 MG/ML
1 INJECTION, SOLUTION INTRAMUSCULAR; INTRAVENOUS; SUBCUTANEOUS
Status: DISCONTINUED | OUTPATIENT
Start: 2018-06-09 | End: 2018-06-10

## 2018-06-09 RX ORDER — DEXTROSE, SODIUM CHLORIDE, AND POTASSIUM CHLORIDE 5; .45; .15 G/100ML; G/100ML; G/100ML
75 INJECTION INTRAVENOUS CONTINUOUS
Status: DISCONTINUED | OUTPATIENT
Start: 2018-06-09 | End: 2018-06-12

## 2018-06-09 RX ORDER — TEMAZEPAM 15 MG/1
15 CAPSULE ORAL
Status: DISCONTINUED | OUTPATIENT
Start: 2018-06-09 | End: 2018-06-14 | Stop reason: HOSPADM

## 2018-06-09 RX ORDER — FENTANYL CITRATE 50 UG/ML
50 INJECTION, SOLUTION INTRAMUSCULAR; INTRAVENOUS
Status: COMPLETED | OUTPATIENT
Start: 2018-06-09 | End: 2018-06-09

## 2018-06-09 RX ORDER — SODIUM CHLORIDE 0.9 % (FLUSH) 0.9 %
10 SYRINGE (ML) INJECTION
Status: COMPLETED | OUTPATIENT
Start: 2018-06-09 | End: 2018-06-09

## 2018-06-09 RX ORDER — MONTELUKAST SODIUM 10 MG/1
10 TABLET ORAL EVERY EVENING
Status: DISCONTINUED | OUTPATIENT
Start: 2018-06-09 | End: 2018-06-14 | Stop reason: HOSPADM

## 2018-06-09 RX ORDER — DIPHENHYDRAMINE HYDROCHLORIDE 50 MG/ML
12.5 INJECTION, SOLUTION INTRAMUSCULAR; INTRAVENOUS
Status: DISCONTINUED | OUTPATIENT
Start: 2018-06-09 | End: 2018-06-14 | Stop reason: HOSPADM

## 2018-06-09 RX ORDER — ENOXAPARIN SODIUM 100 MG/ML
40 INJECTION SUBCUTANEOUS EVERY 24 HOURS
Status: DISCONTINUED | OUTPATIENT
Start: 2018-06-09 | End: 2018-06-14 | Stop reason: HOSPADM

## 2018-06-09 RX ORDER — LORAZEPAM 2 MG/ML
1 INJECTION INTRAMUSCULAR
Status: DISCONTINUED | OUTPATIENT
Start: 2018-06-09 | End: 2018-06-14 | Stop reason: HOSPADM

## 2018-06-09 RX ORDER — SIMVASTATIN 20 MG/1
20 TABLET, FILM COATED ORAL
Status: DISCONTINUED | OUTPATIENT
Start: 2018-06-09 | End: 2018-06-14 | Stop reason: HOSPADM

## 2018-06-09 RX ORDER — SODIUM CHLORIDE 0.9 % (FLUSH) 0.9 %
5-10 SYRINGE (ML) INJECTION AS NEEDED
Status: DISCONTINUED | OUTPATIENT
Start: 2018-06-09 | End: 2018-06-14 | Stop reason: HOSPADM

## 2018-06-09 RX ORDER — FLUOXETINE 10 MG/1
10 CAPSULE ORAL DAILY
Status: DISCONTINUED | OUTPATIENT
Start: 2018-06-10 | End: 2018-06-14 | Stop reason: HOSPADM

## 2018-06-09 RX ORDER — OXYCODONE AND ACETAMINOPHEN 5; 325 MG/1; MG/1
1 TABLET ORAL
Status: DISCONTINUED | OUTPATIENT
Start: 2018-06-09 | End: 2018-06-14 | Stop reason: HOSPADM

## 2018-06-09 RX ORDER — OXYCODONE AND ACETAMINOPHEN 5; 325 MG/1; MG/1
2 TABLET ORAL
Status: DISCONTINUED | OUTPATIENT
Start: 2018-06-09 | End: 2018-06-14 | Stop reason: HOSPADM

## 2018-06-09 RX ORDER — NITROGLYCERIN 0.4 MG/1
0.4 TABLET SUBLINGUAL AS NEEDED
Status: DISCONTINUED | OUTPATIENT
Start: 2018-06-09 | End: 2018-06-14 | Stop reason: HOSPADM

## 2018-06-09 RX ORDER — ESTRADIOL 1 MG/1
2 TABLET ORAL DAILY
Status: DISCONTINUED | OUTPATIENT
Start: 2018-06-10 | End: 2018-06-14 | Stop reason: HOSPADM

## 2018-06-09 RX ORDER — LANOLIN ALCOHOL/MO/W.PET/CERES
400 CREAM (GRAM) TOPICAL 2 TIMES DAILY
Status: DISCONTINUED | OUTPATIENT
Start: 2018-06-09 | End: 2018-06-14 | Stop reason: HOSPADM

## 2018-06-09 RX ORDER — SODIUM CHLORIDE 0.9 % (FLUSH) 0.9 %
5-10 SYRINGE (ML) INJECTION EVERY 8 HOURS
Status: DISCONTINUED | OUTPATIENT
Start: 2018-06-09 | End: 2018-06-14 | Stop reason: HOSPADM

## 2018-06-09 RX ORDER — CARVEDILOL 25 MG/1
25 TABLET ORAL 2 TIMES DAILY
Status: DISCONTINUED | OUTPATIENT
Start: 2018-06-09 | End: 2018-06-14 | Stop reason: HOSPADM

## 2018-06-09 RX ORDER — PANTOPRAZOLE SODIUM 40 MG/1
40 TABLET, DELAYED RELEASE ORAL
Status: DISCONTINUED | OUTPATIENT
Start: 2018-06-10 | End: 2018-06-14 | Stop reason: HOSPADM

## 2018-06-09 RX ORDER — ONDANSETRON 2 MG/ML
4 INJECTION INTRAMUSCULAR; INTRAVENOUS
Status: DISCONTINUED | OUTPATIENT
Start: 2018-06-09 | End: 2018-06-14 | Stop reason: HOSPADM

## 2018-06-09 RX ORDER — HYDROMORPHONE HYDROCHLORIDE 1 MG/ML
0.5 INJECTION, SOLUTION INTRAMUSCULAR; INTRAVENOUS; SUBCUTANEOUS
Status: DISCONTINUED | OUTPATIENT
Start: 2018-06-09 | End: 2018-06-10

## 2018-06-09 RX ORDER — NALOXONE HYDROCHLORIDE 0.4 MG/ML
0.4 INJECTION, SOLUTION INTRAMUSCULAR; INTRAVENOUS; SUBCUTANEOUS AS NEEDED
Status: DISCONTINUED | OUTPATIENT
Start: 2018-06-09 | End: 2018-06-14 | Stop reason: HOSPADM

## 2018-06-09 RX ADMIN — SODIUM CHLORIDE 1000 ML: 900 INJECTION, SOLUTION INTRAVENOUS at 18:00

## 2018-06-09 RX ADMIN — HYDROMORPHONE HYDROCHLORIDE 1 MG: 1 INJECTION, SOLUTION INTRAMUSCULAR; INTRAVENOUS; SUBCUTANEOUS at 21:11

## 2018-06-09 RX ADMIN — DEXTROSE MONOHYDRATE, SODIUM CHLORIDE, AND POTASSIUM CHLORIDE 75 ML/HR: 50; 4.5; 1.49 INJECTION, SOLUTION INTRAVENOUS at 21:11

## 2018-06-09 RX ADMIN — Medication 10 ML: at 21:50

## 2018-06-09 RX ADMIN — IOPAMIDOL 100 ML: 755 INJECTION, SOLUTION INTRAVENOUS at 17:18

## 2018-06-09 RX ADMIN — Medication 10 ML: at 17:18

## 2018-06-09 RX ADMIN — SODIUM CHLORIDE 100 ML: 900 INJECTION, SOLUTION INTRAVENOUS at 17:18

## 2018-06-09 RX ADMIN — HYDROMORPHONE HYDROCHLORIDE 1 MG: 1 INJECTION, SOLUTION INTRAMUSCULAR; INTRAVENOUS; SUBCUTANEOUS at 23:18

## 2018-06-09 RX ADMIN — FENTANYL CITRATE 50 MCG: 50 INJECTION INTRAMUSCULAR; INTRAVENOUS at 18:21

## 2018-06-09 RX ADMIN — ENOXAPARIN SODIUM 40 MG: 100 INJECTION SUBCUTANEOUS at 21:08
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
## 2018-06-09 NOTE — IP AVS SNAPSHOT
Tere Lewis 
 
 
 [address] 
[phone] Patient: Justina Cox MRN: [id number] :1950 About your hospitalization You were admitted on:  2018 You last received care in the:  [facility] 2 SURGICAL You were discharged on:  2018 Why you were hospitalized Your primary diagnosis was:  Multiple Trauma Your diagnoses also included:  Paf (Paroxysmal Atrial Fibrillation) (Hcc), Biventricular Implantable Cardioverter-Defibrillator In Situ, Hld (Hyperlipidemia), Htn (Hypertension) Follow-up Information Follow up With Details Comments Contact Info Meño Rick MD  The office will call for [address] 
[address] [facility] [address] [phone] [address] [place] [address] will contact you regarding your home PT and [address] [place] [address] Rd [address] [facility] [address] 
[phone] Andrea Hobbs MD On 6/15/2018 11:00 [address] 
[phone] Your Scheduled Appointments 2018  9:15 AM EDT Office Visit with Ashleigh House MD  
[facility] CARDIOLOGY [facility] OFFICE ([address] [place] [address]) [address] [place] [address]  
[phone] Tuesday July 10, 2018 10:00 AM EDT  
OFFICE DEVICE CHECKS with JUDD/ROSE DEVICE [facility] CARDIOLOGY [facility] OFFICE ([address] [place] [address]) [address] [place] [address]  
[phone] This upcoming device check will take place IN OUR OFFICE. Please arrive 15 minutes early to review any necessary paperwork requirements. If you have any further questions or need to change this appointment, we are happy to help and can be reached at [phone]. Discharge Orders None A check sofie indicates which time of day the medication should be taken. My Medications CHANGE how you take these medications Instructions Each Dose to Equal  
 Morning Noon Evening Bedtime  
 traMADol 50 mg tablet Commonly known as:  ULTRAM  
What changed:   
- how much to take 
- how to take this - when to take this 
- reasons to take this 
- additional instructions Notes to Patient:  Take as directed 1 tabs by mouth every 6 hours prn pain  Indications: Pain CONTINUE taking these medications Instructions Each Dose to Equal  
 Morning Noon Evening Bedtime CALTRATE 600+D PLUS MINERALS 600 mg calcium- 400 unit Tab Generic drug:  Calcium Carbonate-Vit D3-Min Notes to Patient:  Take as directed Take  by mouth daily. cholecalciferol (VITAMIN D3) 5,000 unit Tab tablet Commonly known as:  VITAMIN D3 Notes to Patient:  Take as directed Take  by mouth daily. COREG 25 mg tablet Generic drug:  carvedilol Your last dose was: This morning Your next dose is: This evening Take 25 mg by mouth two (2) times a day. 25 mg  
    
  
   
   
  
   
  
 COUMADIN 5 mg tablet Generic drug:  warfarin Notes to Patient:  Take as directed Take 5 mg by mouth five (5) days a week. Wednesday, Thursday, Fri, Sat, Sunday  
 5 mg ESTRACE 1 mg tablet Generic drug:  estradiol Your last dose was: This morning Your next dose is:  Tomorrow Morning Take 2 mg by mouth daily. 2 mg FLUoxetine 10 mg tablet Commonly known as:  PROzac Notes to Patient:  Take as directed Take 10 mg by mouth daily. 10 mg  
    
   
   
   
  
 loratadine 10 mg tablet Commonly known as:  Blima [place] Notes to Patient:  Take as directed Take 10 mg by mouth. 10 mg LORazepam 0.5 mg tablet Commonly known as:  ATIVAN Notes to Patient:  Take as directed Take  by mouth. lovastatin 20 mg tablet Commonly known as:  MEVACOR Notes to Patient:  Take as directed Take 40 mg by mouth daily. 40 mg  
    
   
   
   
  
 magnesium oxide 400 mg tablet Commonly known as:  MAG-OX Your last dose was: This morning Your next dose is: This evening Take 400 mg by mouth two (2) times a day. 400 mg MYRBETRIQ 50 mg ER tablet Generic drug:  mirabegron ER Notes to Patient:  Take as directed Take 50 mg by mouth daily. 50 mg  
    
   
   
   
  
 nitroglycerin 0.4 mg SL tablet Commonly known as:  NITROSTAT  
   
 0.4 mg by SubLINGual route every five (5) minutes as needed. 0.4 mg  
    
   
   
   
  
 omeprazole 40 mg capsule Commonly known as:  PRILOSEC Notes to Patient:  Take as directed Take 40 mg by mouth daily. 40 mg  
    
   
   
   
  
 SINGULAIR 10 mg tablet Generic drug:  montelukast  
Your last dose was: Last evening Notes to Patient:  Resume home time to take this Take 10 mg by mouth daily. 10 mg  
    
   
   
   
  
 temazepam 15 mg capsule Commonly known as:  RESTORIL Take 15 mg by mouth nightly as needed. 15 mg  
    
   
   
   
  
 VITAMIN B-12 500 mcg tablet Generic drug:  cyanocobalamin Notes to Patient:  Take as directed Take 500 mcg by mouth. 500 mcg Where to Get Your Medications Information on where to get these meds will be given to you by the nurse or doctor. ! Ask your nurse or doctor about these medications  
  traMADol 50 mg tablet Opioid Education Prescription Opioids: What You Need to Know: 
 
 
 
F-face looks uneven A-arms unable to move or move unevenly S-speech slurred or non-existent T-time-call 911 as soon as signs and symptoms begin-DO NOT go Back to bed or wait to see if you get better-TIME IS BRAIN. Warning Signs of HEART ATTACK Call 911 if you have these symptoms: 
? Chest discomfort. Most heart attacks involve discomfort in the center of the chest that lasts more than a few minutes, or that goes away and comes back. It can feel like uncomfortable pressure, squeezing, fullness, or pain. ? Discomfort in other areas of the upper body. Symptoms can include pain or discomfort in one or both arms, the back, neck, jaw, or stomach. ? Shortness of breath with or without chest discomfort. ? Other signs may include breaking out in a cold sweat, nausea, or lightheadedness. Don't wait more than five minutes to call [address]! Fast action can save your life. Calling 911 is almost always the fastest way to get lifesaving treatment. Emergency Medical Services staff can begin treatment when they arrive  up to an hour sooner than if someone gets to the hospital by car. The discharge information has been reviewed with the patient. The patient verbalized understanding. Discharge medications reviewed with the patient and appropriate educational materials and side effects teaching were provided. ___________________________________________________________________________________________________________________________________ Discharge Instructions/Follow-up Plans: MD Instructions: 
  
Follow-up with Dr. Raisa Damon in 1-2 weeks. Follow-up with physician that monitors coumadin dosing ASAP. TLSO brace when out of bed or in chair but does not need the brace at all times 
  
Diet - as tolerated Activity - ambulate - no heavy lifting >5lb until seen by neurosurgery May shower 
  
No driving while taking narcotics. Do not drink alcohol while taking narcotics. Resume other home medications.  
  
If problems or questions arise, please call our office at [phone]. 
  
Greater than 30 minutes were spent discharging the patient [employer] Announcement We are excited to announce that we are making your provider's discharge notes available to you in SolidFire. You will see these notes when they are completed and signed by the physician that discharged you from your recent hospital stay. If you have any questions or concerns about any information you see in [employer], please call the Health Information Department where you were seen or reach out to your Primary Care Provider for more information about your plan of care. Introducing [facility]! [facility] introduces SolidFire patient portal. Now you can access parts of your medical record, email your doctor's office, and request medication refills online. 1. In your internet browser, go to [email or web address] 2. Click on the First Time User? Click Here link in the Sign In box. You will see the New Member Sign Up page. 3. Enter your SolidFire Access Code exactly as it appears below. You will not need to use this code after youve completed the sign-up process. If you do not sign up before the expiration date, you must request a new code. · SolidFire Access Code: [email or web address] Expires: 8/22/2018  8:47 AM 
 
4. Enter the last four digits of your Social Security Number (xxxx) and Date of Birth (mm/dd/yyyy) as indicated and click Submit. You will be taken to the next sign-up page. 5. Create a LineaQuattrot ID. This will be your SolidFire login ID and cannot be changed, so think of one that is secure and easy to remember. 6. Create a SolidFire password. You can change your password at any time. 7. Enter your Password Reset Question and Answer. This can be used at a later time if you forget your password. 8. Enter your e-mail address. You will receive e-mail notification when new information is available in [address] 9. Click Sign Up. You can now view and download portions of your medical record. 10. Click the Download Summary menu link to download a portable copy of your medical information. If you have questions, please visit the Frequently Asked Questions section of the MyChart website. Remember, Handprint is NOT to be used for urgent needs. For medical emergencies, dial 911. Now available from your iPhone and Android! Introducing Rusty Salas As a [facility] patient, I wanted to make you aware of our electronic visit tool called Rusty Salas. [facility] 24/7 allows you to connect within minutes with a medical provider 24 hours a day, seven days a week via a mobile device or tablet or logging into a secure website from your computer. You can access Rusty Salas from anywhere in the [place]. A virtual visit might be right for you when you have a simple condition and feel like you just dont want to get out of bed, or cant get away from work for an appointment, when your regular [facility] provider is not available (evenings, weekends or holidays), or when youre out of town and need minor care. Electronic visits cost only $49 and if the [facility] 24/7 provider determines a prescription is needed to treat your condition, one can be electronically transmitted to a nearby pharmacy*. Please take a moment to enroll today if you have not already done so. The enrollment process is free and takes just a few minutes. To enroll, please download the [facility] 24/7 florence to your tablet or phone, or visit [email or web address] to enroll on your computer.    
And, as an [address] [place] [address] patient with a [employer] account, the results of your visits will be scanned into your electronic medical record and your primary care provider will be able to view the scanned results. We urge you to continue to see your regular [employer] provider for your ongoing medical care. And while your primary care provider may not be the one available when you seek a [employer] virtual visit, the peace of mind you get from getting a real diagnosis real time can be priceless. For more information on [employer], view our Frequently Asked Questions (FAQs) at [email or web address]. Sincerely, 
 
Lucila Meehan MD 
Chief Medical Officer Rayshawn Stewart *:  certain medications cannot be prescribed via [employer] Unresulted tests-please follow up with your PCP on these results Procedure/Test Authorizing Provider BLOOD GAS, ARTERIAL Hari Stein MD  
 CBC W/O DIFF Hari Stein MD  
 CBC WITH AUTOMATED DIFF Travis Howard, DO  
 CT CHEST ABD PELV W CONT Cayla Mills MD  
 CT HEAD WO CONT Cayla Mills MD  
 CT SPINE CERV WO CONT Cayla Mills MD  
 CTA HEAD NECK W WO CONT Patricia Esquivel MD  
 EKG, 12 LEAD, [facility], DO  
 HEMOGLOBIN A1C WITH Marcelina Adrian MD  
 HGB & HCT Hari Stein MD  
 LIPASE Cayla Mills MD  
 METABOLIC PANEL, Anshu Marie MD  
 METABOLIC PANEL, BASIC Hari Stein MD  
 METABOLIC PANEL, COMPREHENSIVE Travis Howard, DO  
 PROTHROMBIN TIME + INR Cayla Mills MD  
 XR HAND RT MIN 3 Lety Valencia MD  
  
Providers Seen During Your Hospitalization Provider Specialty Primary office phone Cayla Mills MD Emergency Medicine [phone] Hari Stein, [address] [place] [address] Surgery [phone] Immunizations Administered for This Admission Name Date  
 TB Skin Test (PPD) Intradermal  Deferred (),  Deferred (), 6/11/2018 Tdap  Deferred () Your Primary Care Physician (PCP) Primary Care Physician Office Phone Office Fax [address] [facility], [address] [facility] 60 [phone] You are allergic to the following Allergen Reactions Darvocet A500 (Propoxyphene N-Acetaminophen) Hives Nausea and Vomiting Zithromax (Azithromycin) Hives Recent Documentation Height Weight BMI Smoking Status 1.575 m 81.8 kg 32.98 kg/m2 Never Smoker Emergency Contacts Name Discharge Info Relation Home Work Mobile Janiya Haney  Child [2] [phone] [facility] DISCHARGE CAREGIVER [3] Spouse [3] [phone] Patient Belongings The following personal items are in your possession at time of discharge: 
  Dental Appliances: None  Visual Aid: Glasses, At bedside      Home Medications: None           Other Valuables: None Please provide this summary of care documentation to your next provider. Signatures-by signing, you are acknowledging that this After Visit Summary has been reviewed with you and you have received a copy. Patient Signature:  ____________________________________________________________ Date:  ____________________________________________________________  
  
Araceliyn Finger Provider Signature:  ____________________________________________________________ Date:  ____________________________________________________________
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
## 2018-06-10 ENCOUNTER — APPOINTMENT (OUTPATIENT)
Dept: CT IMAGING | Age: 68
DRG: 552 | End: 2018-06-10
Attending: NEUROLOGICAL SURGERY
Payer: MEDICARE

## 2018-06-10 LAB
ANION GAP SERPL CALC-SCNC: 7 MMOL/L (ref 7–16)
ATRIAL RATE: 75 BPM
BUN SERPL-MCNC: 14 MG/DL (ref 8–23)
CALCIUM SERPL-MCNC: 8.1 MG/DL (ref 8.3–10.4)
CALCULATED P AXIS, ECG09: 68 DEGREES
CALCULATED R AXIS, ECG10: -136 DEGREES
CALCULATED T AXIS, ECG11: 45 DEGREES
CHLORIDE SERPL-SCNC: 107 MMOL/L (ref 98–107)
CO2 SERPL-SCNC: 26 MMOL/L (ref 21–32)
CREAT SERPL-MCNC: 0.79 MG/DL (ref 0.6–1)
DIAGNOSIS, 93000: NORMAL
ERYTHROCYTE [DISTWIDTH] IN BLOOD BY AUTOMATED COUNT: 14.9 % (ref 11.9–14.6)
GLUCOSE SERPL-MCNC: 131 MG/DL (ref 65–100)
HCT VFR BLD AUTO: 35.2 % (ref 35.8–46.3)
HCT VFR BLD AUTO: 35.6 % (ref 35.8–46.3)
HCT VFR BLD AUTO: 36.2 % (ref 35.8–46.3)
HCT VFR BLD AUTO: 36.3 % (ref 35.8–46.3)
HGB BLD-MCNC: 11.2 G/DL (ref 11.7–15.4)
HGB BLD-MCNC: 11.5 G/DL (ref 11.7–15.4)
HGB BLD-MCNC: 11.6 G/DL (ref 11.7–15.4)
HGB BLD-MCNC: 11.6 G/DL (ref 11.7–15.4)
MCH RBC QN AUTO: 29.7 PG (ref 26.1–32.9)
MCHC RBC AUTO-ENTMCNC: 32 G/DL (ref 31.4–35)
MCV RBC AUTO: 92.6 FL (ref 79.6–97.8)
P-R INTERVAL, ECG05: 168 MS
PLATELET # BLD AUTO: 221 K/UL (ref 150–450)
PMV BLD AUTO: 10.6 FL (ref 10.8–14.1)
POTASSIUM SERPL-SCNC: 4.1 MMOL/L (ref 3.5–5.1)
Q-T INTERVAL, ECG07: 368 MS
QRS DURATION, ECG06: 126 MS
QTC CALCULATION (BEZET), ECG08: 410 MS
RBC # BLD AUTO: 3.91 M/UL (ref 4.05–5.25)
SODIUM SERPL-SCNC: 140 MMOL/L (ref 136–145)
VENTRICULAR RATE, ECG03: 75 BPM
WBC # BLD AUTO: 8.4 K/UL (ref 4.3–11.1)

## 2018-06-10 PROCEDURE — 77010033678 HC OXYGEN DAILY

## 2018-06-10 PROCEDURE — 77030020263 HC SOL INJ SOD CL0.9% LFCR 1000ML

## 2018-06-10 PROCEDURE — 36415 COLL VENOUS BLD VENIPUNCTURE: CPT | Performed by: SURGERY

## 2018-06-10 PROCEDURE — 77030027138 HC INCENT SPIROMETER -A

## 2018-06-10 PROCEDURE — 74011250637 HC RX REV CODE- 250/637: Performed by: SURGERY

## 2018-06-10 PROCEDURE — 85027 COMPLETE CBC AUTOMATED: CPT | Performed by: SURGERY

## 2018-06-10 PROCEDURE — 74011250636 HC RX REV CODE- 250/636: Performed by: SURGERY

## 2018-06-10 PROCEDURE — 74011000250 HC RX REV CODE- 250: Performed by: SURGERY

## 2018-06-10 PROCEDURE — 74011636320 HC RX REV CODE- 636/320: Performed by: SURGERY

## 2018-06-10 PROCEDURE — 74011000258 HC RX REV CODE- 258: Performed by: SURGERY

## 2018-06-10 PROCEDURE — 65620000000 HC RM CCU GENERAL

## 2018-06-10 PROCEDURE — 80048 BASIC METABOLIC PNL TOTAL CA: CPT | Performed by: SURGERY

## 2018-06-10 PROCEDURE — 70498 CT ANGIOGRAPHY NECK: CPT

## 2018-06-10 RX ORDER — MORPHINE SULFATE 2 MG/ML
4 INJECTION, SOLUTION INTRAMUSCULAR; INTRAVENOUS
Status: DISCONTINUED | OUTPATIENT
Start: 2018-06-10 | End: 2018-06-12 | Stop reason: SDUPTHER

## 2018-06-10 RX ORDER — SODIUM CHLORIDE 0.9 % (FLUSH) 0.9 %
10 SYRINGE (ML) INJECTION
Status: COMPLETED | OUTPATIENT
Start: 2018-06-10 | End: 2018-06-10

## 2018-06-10 RX ADMIN — Medication 10 ML: at 06:08

## 2018-06-10 RX ADMIN — ONDANSETRON 4 MG: 2 INJECTION INTRAMUSCULAR; INTRAVENOUS at 07:10

## 2018-06-10 RX ADMIN — HYDROMORPHONE HYDROCHLORIDE 1 MG: 1 INJECTION, SOLUTION INTRAMUSCULAR; INTRAVENOUS; SUBCUTANEOUS at 01:58

## 2018-06-10 RX ADMIN — ONDANSETRON 4 MG: 2 INJECTION INTRAMUSCULAR; INTRAVENOUS at 14:38

## 2018-06-10 RX ADMIN — Medication 400 MG: at 17:28

## 2018-06-10 RX ADMIN — HYDROMORPHONE HYDROCHLORIDE 1 MG: 1 INJECTION, SOLUTION INTRAMUSCULAR; INTRAVENOUS; SUBCUTANEOUS at 07:11

## 2018-06-10 RX ADMIN — PANTOPRAZOLE SODIUM 40 MG: 40 TABLET, DELAYED RELEASE ORAL at 08:53

## 2018-06-10 RX ADMIN — ESTRADIOL 2 MG: 1 TABLET ORAL at 08:53

## 2018-06-10 RX ADMIN — FLUOXETINE 10 MG: 10 CAPSULE ORAL at 08:53

## 2018-06-10 RX ADMIN — DEXTROSE MONOHYDRATE, SODIUM CHLORIDE, AND POTASSIUM CHLORIDE 75 ML/HR: 50; 4.5; 1.49 INJECTION, SOLUTION INTRAVENOUS at 11:37

## 2018-06-10 RX ADMIN — Medication 10 ML: at 14:38

## 2018-06-10 RX ADMIN — ENOXAPARIN SODIUM 40 MG: 100 INJECTION SUBCUTANEOUS at 21:42

## 2018-06-10 RX ADMIN — SODIUM CHLORIDE 100 ML: 900 INJECTION, SOLUTION INTRAVENOUS at 12:50

## 2018-06-10 RX ADMIN — Medication 10 ML: at 12:50

## 2018-06-10 RX ADMIN — Medication 400 MG: at 08:52

## 2018-06-10 RX ADMIN — IOPAMIDOL 80 ML: 755 INJECTION, SOLUTION INTRAVENOUS at 12:50

## 2018-06-10 RX ADMIN — OXYCODONE HYDROCHLORIDE AND ACETAMINOPHEN 1 TABLET: 5; 325 TABLET ORAL at 10:39

## 2018-06-10 RX ADMIN — SODIUM CHLORIDE 12.5 MG: 9 INJECTION INTRAMUSCULAR; INTRAVENOUS; SUBCUTANEOUS at 18:01

## 2018-06-10 RX ADMIN — SODIUM CHLORIDE 1000 ML: 900 INJECTION, SOLUTION INTRAVENOUS at 13:42

## 2018-06-10 RX ADMIN — SODIUM CHLORIDE 12.5 MG: 9 INJECTION INTRAMUSCULAR; INTRAVENOUS; SUBCUTANEOUS at 10:38

## 2018-06-10 RX ADMIN — HYDROMORPHONE HYDROCHLORIDE 0.5 MG: 1 INJECTION, SOLUTION INTRAMUSCULAR; INTRAVENOUS; SUBCUTANEOUS at 12:30

## 2018-06-10 RX ADMIN — MONTELUKAST SODIUM 10 MG: 10 TABLET, FILM COATED ORAL at 17:28

## 2018-06-10 RX ADMIN — SODIUM CHLORIDE 500 ML: 900 INJECTION, SOLUTION INTRAVENOUS at 18:17

## 2018-06-10 RX ADMIN — CARVEDILOL 25 MG: 25 TABLET, FILM COATED ORAL at 08:53

## 2018-06-10 RX ADMIN — Medication 10 ML: at 21:42

## 2018-06-10 RX ADMIN — HYDROMORPHONE HYDROCHLORIDE 0.5 MG: 1 INJECTION, SOLUTION INTRAMUSCULAR; INTRAVENOUS; SUBCUTANEOUS at 15:23

## 2018-06-10 RX ADMIN — CARVEDILOL 25 MG: 25 TABLET, FILM COATED ORAL at 17:28

## 2018-06-10 RX ADMIN — SIMVASTATIN 20 MG: 20 TABLET, FILM COATED ORAL at 21:42

## 2018-06-10 NOTE — PROGRESS NOTES
Neurosurgery and Cardiology consults not called in ED. Dr. Ingrid Mascorro informed that its has to be a physician to physician call at this time.  MD given nursing supervisor number per request.
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
## 2018-06-10 NOTE — ED NOTES
TRANSFER - OUT REPORT:    Verbal report given to Sirena Storm RN(name) on Mell Hungarian  being transferred to CCU(unit) for routine progression of care       Report consisted of patients Situation, Background, Assessment and   Recommendations(SBAR). Information from the following report(s) SBAR was reviewed with the receiving nurse. Lines:   Peripheral IV 06/09/18 Right Antecubital (Active)        Opportunity for questions and clarification was provided.       Patient transported with:   Registered Nurse
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
## 2018-06-10 NOTE — PROGRESS NOTES
Patient has vomited. Complete bed change done. Noted skin tear to right hand area. Cleaned with saline and placed Xeroform and Quincy bandage over site. Scabbed area to left arm noted. Complaining of pain in back of head 10/10.

## 2018-06-10 NOTE — PROGRESS NOTES
Bedside and Verbal shift change report given to Kathryn Ruiz RN (oncoming nurse) by Rudi Lucia RN (offgoing nurse). Report included the following information SBAR, Kardex, ED Summary, Intake/Output, MAR, Recent Results and Cardiac Rhythm paced. Dual skin assessment completed. Patient requesting pain medication.
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
## 2018-06-10 NOTE — PROGRESS NOTES
Late entry-    Dual skin assessment completed with Reza Huddleston RN.  CHG bath given. Skin is dry and intact. Ecchymosis noted to BUE; right wrist wrapped with coban. Healing abrasion noted to left forearm. Patient able to move all extremities; no parenthesis noted. Patient remains in supine position. Call light within reach.

## 2018-06-10 NOTE — PROGRESS NOTES
Bedside and Verbal shift change report given to Tanya Levine RN (oncoming nurse) by Carrie Whitley RN (offgoing nurse). Report included the following information SBAR, Kardex, Intake/Output, MAR, Recent Results and Cardiac Rhythm paced. Patient resting in bed, and very somulent. TLSO brace intact. Patient denies pain at this time.

## 2018-06-10 NOTE — PROGRESS NOTES
[address] [place] [address] TLSO lower spine brace placed on patient by Colby Mobley, [facility] with [employer].

## 2018-06-10 NOTE — PROGRESS NOTES
Rosy Ferrell 89  Daughter - Zhou Kaleb  No code status on file  Lives in [place], [place]  Psych disorder  Obesity    Dia Khloe, staff [address], [address] [facility]  /   [email or web address]

## 2018-06-10 NOTE — PROGRESS NOTES
TRANSFER - IN REPORT:    Verbal report received from Molly Weber, [facility] on Anya Sports  being received from ED for routine progression of care      Report consisted of patients Situation, Background, Assessment and   Recommendations(SBAR). Information from the following report(s) SBAR, Kardex, ED Summary, Intake/Output, MAR, Recent Results and Cardiac Rhythm paced was reviewed with the receiving nurse. Opportunity for questions and clarification was provided. Assessment completed upon patients arrival to unit and care assumed.
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
## 2018-06-10 NOTE — PROGRESS NOTES
Dr Marina Peters here to see patient POC discussed.  at bedside. Patient vomited 100 ml of green/yellow emesis.

## 2018-06-11 LAB
ANION GAP SERPL CALC-SCNC: 11 MMOL/L (ref 7–16)
ARTERIAL PATENCY WRIST A: POSITIVE
BASE DEFICIT BLDA-SCNC: 3.4 MMOL/L (ref 0–2)
BDY SITE: ABNORMAL
BUN SERPL-MCNC: 9 MG/DL (ref 8–23)
CALCIUM SERPL-MCNC: 7.3 MG/DL (ref 8.3–10.4)
CHLORIDE SERPL-SCNC: 109 MMOL/L (ref 98–107)
CO2 SERPL-SCNC: 21 MMOL/L (ref 21–32)
COHGB MFR BLD: 0.7 % (ref 0.5–1.5)
CREAT BLD-MCNC: 0.9 MG/DL (ref 0.8–1.5)
CREAT SERPL-MCNC: 0.61 MG/DL (ref 0.6–1)
DO-HGB BLD-MCNC: 1 % (ref 0–5)
ERYTHROCYTE [DISTWIDTH] IN BLOOD BY AUTOMATED COUNT: 14.8 % (ref 11.9–14.6)
GLUCOSE SERPL-MCNC: 92 MG/DL (ref 65–100)
HCO3 BLDA-SCNC: 20 MMOL/L (ref 22–26)
HCT VFR BLD AUTO: 33.4 % (ref 35.8–46.3)
HCT VFR BLD AUTO: 34.1 % (ref 35.8–46.3)
HGB BLD-MCNC: 10.9 G/DL (ref 11.7–15.4)
HGB BLD-MCNC: 11.2 G/DL (ref 11.7–15.4)
HGB BLDMV-MCNC: 8.9 GM/DL (ref 11.7–15)
MCH RBC QN AUTO: 31.3 PG (ref 26.1–32.9)
MCHC RBC AUTO-ENTMCNC: 32.8 G/DL (ref 31.4–35)
MCV RBC AUTO: 95.3 FL (ref 79.6–97.8)
METHGB MFR BLD: 0.8 % (ref 0–1.5)
OXYHGB MFR BLDA: 97.2 % (ref 94–97)
PCO2 BLDA: 28 MMHG (ref 35–45)
PEEP RESPIRATORY: 8 CM[H2O]
PH BLDA: 7.46 [PH] (ref 7.35–7.45)
PLATELET # BLD AUTO: 191 K/UL (ref 150–450)
PMV BLD AUTO: 11.3 FL (ref 10.8–14.1)
PO2 BLDA: 119 MMHG (ref 80–105)
POTASSIUM SERPL-SCNC: 4.2 MMOL/L (ref 3.5–5.1)
RBC # BLD AUTO: 3.58 M/UL (ref 4.05–5.25)
RESP RATE: 24
SAO2 % BLD: 99 % (ref 92–98.5)
SERVICE CMNT-IMP: ABNORMAL
SODIUM SERPL-SCNC: 141 MMOL/L (ref 136–145)
VENTILATION MODE VENT: ABNORMAL
VT SETTING VENT: 450 ML
WBC # BLD AUTO: 6.5 K/UL (ref 4.3–11.1)

## 2018-06-11 PROCEDURE — 85018 HEMOGLOBIN: CPT | Performed by: SURGERY

## 2018-06-11 PROCEDURE — 80048 BASIC METABOLIC PNL TOTAL CA: CPT | Performed by: SURGERY

## 2018-06-11 PROCEDURE — 36415 COLL VENOUS BLD VENIPUNCTURE: CPT | Performed by: SURGERY

## 2018-06-11 PROCEDURE — 36600 WITHDRAWAL OF ARTERIAL BLOOD: CPT

## 2018-06-11 PROCEDURE — 82803 BLOOD GASES ANY COMBINATION: CPT

## 2018-06-11 PROCEDURE — 74011000302 HC RX REV CODE- 302: Performed by: SURGERY

## 2018-06-11 PROCEDURE — 77030020263 HC SOL INJ SOD CL0.9% LFCR 1000ML

## 2018-06-11 PROCEDURE — 65270000029 HC RM PRIVATE

## 2018-06-11 PROCEDURE — 86580 TB INTRADERMAL TEST: CPT | Performed by: SURGERY

## 2018-06-11 PROCEDURE — 74011250636 HC RX REV CODE- 250/636: Performed by: SURGERY

## 2018-06-11 PROCEDURE — 74011250637 HC RX REV CODE- 250/637: Performed by: SURGERY

## 2018-06-11 PROCEDURE — 97162 PT EVAL MOD COMPLEX 30 MIN: CPT

## 2018-06-11 PROCEDURE — 85027 COMPLETE CBC AUTOMATED: CPT | Performed by: SURGERY

## 2018-06-11 RX ADMIN — SODIUM CHLORIDE 1000 ML: 900 INJECTION, SOLUTION INTRAVENOUS at 00:13

## 2018-06-11 RX ADMIN — PANTOPRAZOLE SODIUM 40 MG: 40 TABLET, DELAYED RELEASE ORAL at 08:48

## 2018-06-11 RX ADMIN — Medication 10 ML: at 22:17

## 2018-06-11 RX ADMIN — Medication 10 ML: at 20:32

## 2018-06-11 RX ADMIN — Medication 400 MG: at 17:09

## 2018-06-11 RX ADMIN — ESTRADIOL 2 MG: 1 TABLET ORAL at 08:47

## 2018-06-11 RX ADMIN — DEXTROSE MONOHYDRATE, SODIUM CHLORIDE, AND POTASSIUM CHLORIDE 75 ML/HR: 50; 4.5; 1.49 INJECTION, SOLUTION INTRAVENOUS at 15:38

## 2018-06-11 RX ADMIN — Medication 400 MG: at 08:47

## 2018-06-11 RX ADMIN — DEXTROSE MONOHYDRATE, SODIUM CHLORIDE, AND POTASSIUM CHLORIDE 75 ML/HR: 50; 4.5; 1.49 INJECTION, SOLUTION INTRAVENOUS at 01:01

## 2018-06-11 RX ADMIN — FLUOXETINE 10 MG: 10 CAPSULE ORAL at 08:48

## 2018-06-11 RX ADMIN — MORPHINE SULFATE 4 MG: 2 INJECTION, SOLUTION INTRAMUSCULAR; INTRAVENOUS at 00:49

## 2018-06-11 RX ADMIN — Medication 10 ML: at 05:52

## 2018-06-11 RX ADMIN — Medication 10 ML: at 14:00

## 2018-06-11 RX ADMIN — OXYCODONE HYDROCHLORIDE AND ACETAMINOPHEN 1 TABLET: 5; 325 TABLET ORAL at 22:03

## 2018-06-11 RX ADMIN — SODIUM CHLORIDE 500 ML: 900 INJECTION, SOLUTION INTRAVENOUS at 05:58

## 2018-06-11 RX ADMIN — MONTELUKAST SODIUM 10 MG: 10 TABLET, FILM COATED ORAL at 17:08

## 2018-06-11 RX ADMIN — TUBERCULIN PURIFIED PROTEIN DERIVATIVE 5 UNITS: 5 INJECTION, SOLUTION INTRADERMAL at 12:56

## 2018-06-11 RX ADMIN — ENOXAPARIN SODIUM 40 MG: 100 INJECTION SUBCUTANEOUS at 22:14

## 2018-06-11 RX ADMIN — SIMVASTATIN 20 MG: 20 TABLET, FILM COATED ORAL at 22:04

## 2018-06-11 RX ADMIN — OXYCODONE HYDROCHLORIDE AND ACETAMINOPHEN 1 TABLET: 5; 325 TABLET ORAL at 08:48

## 2018-06-11 RX ADMIN — CARVEDILOL 25 MG: 25 TABLET, FILM COATED ORAL at 17:09

## 2018-06-11 RX ADMIN — OXYCODONE HYDROCHLORIDE AND ACETAMINOPHEN 1 TABLET: 5; 325 TABLET ORAL at 17:08

## 2018-06-11 RX ADMIN — CARVEDILOL 25 MG: 25 TABLET, FILM COATED ORAL at 08:48
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
## 2018-06-11 NOTE — PROGRESS NOTES
Dr. Mosquera Shed updated on patient's UOP. Orders received for 500 mL NS bolus once. Telephone order with readback for clarification.
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
## 2018-06-11 NOTE — PROGRESS NOTES
Resting in bed with Warren Brace on. 500 ml saline bolus infusing. Paced rhythm noted on monitor. Pain 3-4/10 back and shoulder.
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
## 2018-06-11 NOTE — PROGRESS NOTES
TRANSFER - IN REPORT:    Verbal report received from Genesis Irene RN(name) on Molly Akins  being received from CCU(unit) for routine progression of care      Report consisted of patients Situation, Background, Assessment and   Recommendations(SBAR). Information from the following report(s) SBAR, Kardex, ED Summary, Procedure Summary and Intake/Output was reviewed with the receiving nurse. Opportunity for questions and clarification was provided. Assessment to be completed upon patients arrival to unit and care assumed.
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
## 2018-06-11 NOTE — INTERDISCIPLINARY ROUNDS
Interdisciplinary team rounds were held 6/11/2018 with the following team members:Care Management, Nursing, Nurse Practitioner, Nutrition, Palliative Care, Pastoral Care, Pharmacy, Physician and Respiratory Therapy. Plan of care discussed. See clinical pathway and/or care plan for interventions and desired outcomes.

## 2018-06-11 NOTE — PROGRESS NOTES
TRANSFER - OUT REPORT:    Verbal report given to [facility] RN(name) on Katia Oleary  being transferred to [facility](unit) for routine progression of care       Report consisted of patients Situation, Background, Assessment and   Recommendations(SBAR). Information from the following report(s) SBAR, Kardex, ED Summary, Intake/Output, MAR, Recent Results, Med Rec Status and Cardiac Rhythm Paced 73 was reviewed with the receiving nurse. Lines:   Peripheral IV 06/10/18 Right; Lower Forearm (Active)   Site Assessment Clean, dry, & intact 6/11/2018  7:00 AM   Phlebitis Assessment 0 6/11/2018  7:00 AM   Infiltration Assessment 0 6/11/2018  7:00 AM   Dressing Status Clean, dry, & intact 6/11/2018  7:00 AM   Dressing Type Tape;Transparent 6/11/2018  7:00 AM   Hub Color/Line Status Blue; Infusing;Patent 6/11/2018  7:00 AM   Alcohol Cap Used No 6/11/2018  7:00 AM       Peripheral IV 06/11/18 Left Hand (Active)   Site Assessment Clean, dry, & intact 6/11/2018  7:00 AM   Phlebitis Assessment 0 6/11/2018  7:00 AM   Infiltration Assessment 0 6/11/2018  7:00 AM   Dressing Status Clean, dry, & intact 6/11/2018  7:00 AM   Dressing Type Tape;Transparent 6/11/2018  7:00 AM   Hub Color/Line Status Pink; Infusing;Patent 6/11/2018  7:00 AM   Alcohol Cap Used No 6/11/2018  7:00 AM        Opportunity for questions and clarification was provided. Patient transported with:   in wheelchair accompanied by nurse. Has TLSO brace on.

## 2018-06-12 LAB
ANION GAP SERPL CALC-SCNC: 8 MMOL/L (ref 7–16)
BUN SERPL-MCNC: 5 MG/DL (ref 8–23)
CALCIUM SERPL-MCNC: 7.9 MG/DL (ref 8.3–10.4)
CHLORIDE SERPL-SCNC: 108 MMOL/L (ref 98–107)
CO2 SERPL-SCNC: 24 MMOL/L (ref 21–32)
CREAT SERPL-MCNC: 0.63 MG/DL (ref 0.6–1)
ERYTHROCYTE [DISTWIDTH] IN BLOOD BY AUTOMATED COUNT: 14.7 % (ref 11.9–14.6)
GLUCOSE SERPL-MCNC: 117 MG/DL (ref 65–100)
HCT VFR BLD AUTO: 32.7 % (ref 35.8–46.3)
HCT VFR BLD AUTO: 35.2 % (ref 35.8–46.3)
HGB BLD-MCNC: 10.3 G/DL (ref 11.7–15.4)
HGB BLD-MCNC: 11.4 G/DL (ref 11.7–15.4)
MCH RBC QN AUTO: 29.1 PG (ref 26.1–32.9)
MCHC RBC AUTO-ENTMCNC: 31.5 G/DL (ref 31.4–35)
MCV RBC AUTO: 92.4 FL (ref 79.6–97.8)
MM INDURATION POC: NORMAL MM (ref 0–5)
PLATELET # BLD AUTO: 171 K/UL (ref 150–450)
PMV BLD AUTO: 10.9 FL (ref 10.8–14.1)
POTASSIUM SERPL-SCNC: 3.8 MMOL/L (ref 3.5–5.1)
PPD POC: NORMAL NEGATIVE
RBC # BLD AUTO: 3.54 M/UL (ref 4.05–5.25)
SODIUM SERPL-SCNC: 140 MMOL/L (ref 136–145)
WBC # BLD AUTO: 5.4 K/UL (ref 4.3–11.1)

## 2018-06-12 PROCEDURE — 97530 THERAPEUTIC ACTIVITIES: CPT

## 2018-06-12 PROCEDURE — 74011250637 HC RX REV CODE- 250/637: Performed by: SURGERY

## 2018-06-12 PROCEDURE — 80048 BASIC METABOLIC PNL TOTAL CA: CPT | Performed by: SURGERY

## 2018-06-12 PROCEDURE — 97535 SELF CARE MNGMENT TRAINING: CPT

## 2018-06-12 PROCEDURE — 97166 OT EVAL MOD COMPLEX 45 MIN: CPT

## 2018-06-12 PROCEDURE — 36415 COLL VENOUS BLD VENIPUNCTURE: CPT | Performed by: SURGERY

## 2018-06-12 PROCEDURE — 85018 HEMOGLOBIN: CPT | Performed by: SURGERY

## 2018-06-12 PROCEDURE — 74011250636 HC RX REV CODE- 250/636: Performed by: SURGERY

## 2018-06-12 PROCEDURE — 99221 1ST HOSP IP/OBS SF/LOW 40: CPT | Performed by: PHYSICAL MEDICINE & REHABILITATION

## 2018-06-12 PROCEDURE — 85027 COMPLETE CBC AUTOMATED: CPT | Performed by: SURGERY

## 2018-06-12 PROCEDURE — 65270000029 HC RM PRIVATE

## 2018-06-12 RX ORDER — MORPHINE SULFATE 10 MG/ML
4 INJECTION, SOLUTION INTRAMUSCULAR; INTRAVENOUS
Status: DISCONTINUED | OUTPATIENT
Start: 2018-06-12 | End: 2018-06-14 | Stop reason: HOSPADM

## 2018-06-12 RX ADMIN — Medication 10 ML: at 06:07

## 2018-06-12 RX ADMIN — FLUOXETINE 10 MG: 10 CAPSULE ORAL at 09:47

## 2018-06-12 RX ADMIN — ENOXAPARIN SODIUM 40 MG: 100 INJECTION SUBCUTANEOUS at 21:47

## 2018-06-12 RX ADMIN — OXYCODONE HYDROCHLORIDE AND ACETAMINOPHEN 1 TABLET: 5; 325 TABLET ORAL at 21:52

## 2018-06-12 RX ADMIN — OXYCODONE HYDROCHLORIDE AND ACETAMINOPHEN 1 TABLET: 5; 325 TABLET ORAL at 09:54

## 2018-06-12 RX ADMIN — ESTRADIOL 2 MG: 1 TABLET ORAL at 09:48

## 2018-06-12 RX ADMIN — MONTELUKAST SODIUM 10 MG: 10 TABLET, FILM COATED ORAL at 18:27

## 2018-06-12 RX ADMIN — CARVEDILOL 25 MG: 25 TABLET, FILM COATED ORAL at 09:47

## 2018-06-12 RX ADMIN — Medication 400 MG: at 09:47

## 2018-06-12 RX ADMIN — Medication 10 ML: at 14:55

## 2018-06-12 RX ADMIN — Medication 10 ML: at 21:47

## 2018-06-12 RX ADMIN — Medication 400 MG: at 18:27

## 2018-06-12 RX ADMIN — CARVEDILOL 25 MG: 25 TABLET, FILM COATED ORAL at 18:26

## 2018-06-12 RX ADMIN — DEXTROSE MONOHYDRATE, SODIUM CHLORIDE, AND POTASSIUM CHLORIDE 75 ML/HR: 50; 4.5; 1.49 INJECTION, SOLUTION INTRAVENOUS at 04:50

## 2018-06-12 RX ADMIN — SIMVASTATIN 20 MG: 20 TABLET, FILM COATED ORAL at 21:47

## 2018-06-12 RX ADMIN — PANTOPRAZOLE SODIUM 40 MG: 40 TABLET, DELAYED RELEASE ORAL at 06:07
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
## 2018-06-12 NOTE — PROGRESS NOTES
END OF SHIFT NOTE:    INTAKE/OUTPUT  06/11 0701 - 06/12 0700  In: 3789 [P.O.:720; I.V.:870]  Out: 1325 [Urine:1325]  Voiding: YES  Catheter: YES  Drain:              Flatus: Patient does have flatus present. Stool:  0 occurrences. Characteristics:       Emesis: 0 occurrences. Characteristics:   Emesis Assessment  Appearance: Green  Emesis Amount: Medium    VITAL SIGNS  Patient Vitals for the past 12 hrs:   Temp Pulse Resp BP SpO2   06/12/18 0715 98.8 °F (37.1 °C) 74 18 112/72 95 %   06/12/18 0310 98.1 °F (36.7 °C) 71 18 113/71 96 %   06/12/18 0031 98.6 °F (37 °C) 75 18 101/63 94 %   06/11/18 2029 98.8 °F (37.1 °C) 76 20 121/77 95 %       Pain Assessment  Pain Intensity 1: 0 (06/12/18 0031)  Pain Location 1: Back  Pain Intervention(s) 1: Refused  Patient Stated Pain Goal: 3    Ambulating  No    Shift report given to oncoming nurse at the bedside.     Mary Maher RN
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
## 2018-06-12 NOTE — PROGRESS NOTES
[facility] CRITICAL CARE OUTREACH NURSE PROGRESS REPORT      SUBJECTIVE: Called to assess patient secondary to recent transfer from ICU. MEWS Score: 1 (06/11/18 1631)  Vitals:    06/11/18 1830 06/11/18 1930 06/11/18 2001 06/11/18 2029   BP: 101/55 113/58 113/66 121/77   Pulse: 78 73  76   Resp: 30 15  20   Temp:    98.8 °F (37.1 °C)   SpO2: 93% 92%  95%   Weight:       Height:           LAB DATA:    Recent Labs      06/11/18   0440  06/10/18   0432  06/09/18   1702   NA  141  140  140   K  4.2  4.1  3.9   CL  109*  107  106   CO2  21  26  27   AGAP  11  7  7   GLU  92  131*  102*   BUN  9  14  17   CREA  0.61  0.79  0.91   GFRAA  >60  >60  >60   GFRNA  >60  >60  >60   CA  7.3*  8.1*  8.7   ALB   --    --   3.0*   TP   --    --   6.4   GLOB   --    --   3.4   AGRAT   --    --   0.9*   ALT   --    --   20        Recent Labs      06/11/18   1705  06/11/18   0440  06/10/18   2049   06/10/18   0432   06/09/18   1702   WBC   --   6.5   --    --   8.4   --   15.1*   HGB  10.9*  11.2*  11.6*   < >  11.6*   < >  12.4   HCT  33.4*  34.1*  36.3   < >  36.2   < >  37.0   PLT   --   191   --    --   221   --   240    < > = values in this interval not displayed. OBJECTIVE: On arrival to room, I found patient to be resting in bed. Pain Assessment  Pain Intensity 1: 6 (06/11/18 2203)  Pain Location 1: Back  Pain Intervention(s) 1: Medication (see MAR)  Patient Stated Pain Goal: 3                                 ASSESSMENT:  Pt alert and oriented. HR 83, O2 sats 94% on RA. Lungs are CTA. Pt complaining of 6/10 pain - primary RN notified. Assisted pt in repositioning to help relieve pain. PLAN:  Continue to monitor per outreach protocol.
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
## 2018-06-12 NOTE — PROGRESS NOTES
Date of Outreach Update:  Doc Craft was seen and assessed. MEWS Score: 1 (06/11/18 1631)  Vitals:    06/11/18 2001 06/11/18 2029 06/12/18 0031 06/12/18 0310   BP: 113/66 121/77 101/63 113/71   Pulse:  76 75 71   Resp:  20 18 18   Temp:  98.8 °F (37.1 °C) 98.6 °F (37 °C) 98.1 °F (36.7 °C)   SpO2:  95% 94% 96%   Weight:       Height:             Pain Assessment  Pain Intensity 1: 6 (06/11/18 2203)  Pain Location 1: Back  Pain Intervention(s) 1: Medication (see MAR)  Patient Stated Pain Goal: 3      Previous Outreach assessment has been reviewed. There have been no significant clinical changes since the completion of the last dated Outreach assessment. Pt sleeping. Will continue to follow up per outreach protocol.     Signed By:   Sally Jones    June 12, 2018 6:05 AM

## 2018-06-12 NOTE — PROGRESS NOTES
Date of Outreach Update:  Yue Chan was seen and assessed. MEWS Score: 1 (06/11/18 1631)  Vitals:    06/11/18 1830 06/11/18 1930 06/11/18 2001 06/11/18 2029   BP: 101/55 113/58 113/66 121/77   Pulse: 78 73  76   Resp: 30 15  20   Temp:    98.8 °F (37.1 °C)   SpO2: 93% 92%  95%   Weight:       Height:             Pain Assessment  Pain Intensity 1: 6 (06/11/18 2203)  Pain Location 1: Back  Pain Intervention(s) 1: Medication (see MAR)  Patient Stated Pain Goal: 3      Previous Outreach assessment has been reviewed. There have been no significant clinical changes since the completion of the last dated Outreach assessment. Pt sleeping  Will continue to follow up per outreach protocol.     Signed By:   Rebekah Feliz    June 12, 2018 12:09 AM

## 2018-06-13 ENCOUNTER — HOME HEALTH ADMISSION (OUTPATIENT)
Dept: HOME HEALTH SERVICES | Facility: HOME HEALTH | Age: 68
End: 2018-06-13
Payer: MEDICARE

## 2018-06-13 LAB
ANION GAP SERPL CALC-SCNC: 9 MMOL/L (ref 7–16)
BUN SERPL-MCNC: 5 MG/DL (ref 8–23)
CALCIUM SERPL-MCNC: 8.1 MG/DL (ref 8.3–10.4)
CHLORIDE SERPL-SCNC: 109 MMOL/L (ref 98–107)
CO2 SERPL-SCNC: 26 MMOL/L (ref 21–32)
CREAT SERPL-MCNC: 0.52 MG/DL (ref 0.6–1)
ERYTHROCYTE [DISTWIDTH] IN BLOOD BY AUTOMATED COUNT: 14.5 % (ref 11.9–14.6)
EST. AVERAGE GLUCOSE BLD GHB EST-MCNC: 105 MG/DL
GLUCOSE SERPL-MCNC: 96 MG/DL (ref 65–100)
HBA1C MFR BLD: 5.3 % (ref 4.8–6)
HCT VFR BLD AUTO: 31.6 % (ref 35.8–46.3)
HGB BLD-MCNC: 10.3 G/DL (ref 11.7–15.4)
MCH RBC QN AUTO: 29.7 PG (ref 26.1–32.9)
MCHC RBC AUTO-ENTMCNC: 32.6 G/DL (ref 31.4–35)
MCV RBC AUTO: 91.1 FL (ref 79.6–97.8)
MM INDURATION POC: NORMAL 0 MM (ref 0–5)
PLATELET # BLD AUTO: 218 K/UL (ref 150–450)
PMV BLD AUTO: 10.5 FL (ref 10.8–14.1)
POTASSIUM SERPL-SCNC: 3.6 MMOL/L (ref 3.5–5.1)
PPD POC: NORMAL NEGATIVE
RBC # BLD AUTO: 3.47 M/UL (ref 4.05–5.25)
SODIUM SERPL-SCNC: 144 MMOL/L (ref 136–145)
WBC # BLD AUTO: 5.2 K/UL (ref 4.3–11.1)

## 2018-06-13 PROCEDURE — 83036 HEMOGLOBIN GLYCOSYLATED A1C: CPT | Performed by: SURGERY

## 2018-06-13 PROCEDURE — 36415 COLL VENOUS BLD VENIPUNCTURE: CPT | Performed by: SURGERY

## 2018-06-13 PROCEDURE — 97530 THERAPEUTIC ACTIVITIES: CPT

## 2018-06-13 PROCEDURE — 65270000029 HC RM PRIVATE

## 2018-06-13 PROCEDURE — 74011250636 HC RX REV CODE- 250/636: Performed by: SURGERY

## 2018-06-13 PROCEDURE — 85027 COMPLETE CBC AUTOMATED: CPT | Performed by: SURGERY

## 2018-06-13 PROCEDURE — 74011250637 HC RX REV CODE- 250/637: Performed by: SURGERY

## 2018-06-13 PROCEDURE — 80048 BASIC METABOLIC PNL TOTAL CA: CPT | Performed by: SURGERY

## 2018-06-13 RX ORDER — POLYETHYLENE GLYCOL 3350 17 G/17G
17 POWDER, FOR SOLUTION ORAL DAILY
Status: DISCONTINUED | OUTPATIENT
Start: 2018-06-13 | End: 2018-06-14 | Stop reason: HOSPADM

## 2018-06-13 RX ADMIN — POLYETHYLENE GLYCOL (3350) 17 G: 17 POWDER, FOR SOLUTION ORAL at 13:05

## 2018-06-13 RX ADMIN — Medication 10 ML: at 06:00

## 2018-06-13 RX ADMIN — CARVEDILOL 25 MG: 25 TABLET, FILM COATED ORAL at 09:16

## 2018-06-13 RX ADMIN — ESTRADIOL 2 MG: 1 TABLET ORAL at 09:16

## 2018-06-13 RX ADMIN — CARVEDILOL 25 MG: 25 TABLET, FILM COATED ORAL at 17:41

## 2018-06-13 RX ADMIN — MONTELUKAST SODIUM 10 MG: 10 TABLET, FILM COATED ORAL at 17:41

## 2018-06-13 RX ADMIN — SIMVASTATIN 20 MG: 20 TABLET, FILM COATED ORAL at 22:51

## 2018-06-13 RX ADMIN — Medication 10 ML: at 22:00

## 2018-06-13 RX ADMIN — PANTOPRAZOLE SODIUM 40 MG: 40 TABLET, DELAYED RELEASE ORAL at 06:44

## 2018-06-13 RX ADMIN — Medication 400 MG: at 09:16

## 2018-06-13 RX ADMIN — Medication 400 MG: at 17:41

## 2018-06-13 RX ADMIN — Medication 10 ML: at 13:06

## 2018-06-13 RX ADMIN — ENOXAPARIN SODIUM 40 MG: 100 INJECTION SUBCUTANEOUS at 20:54

## 2018-06-13 RX ADMIN — FLUOXETINE 10 MG: 10 CAPSULE ORAL at 09:16

## 2018-06-13 RX ADMIN — OXYCODONE HYDROCHLORIDE AND ACETAMINOPHEN 1 TABLET: 5; 325 TABLET ORAL at 17:46
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
## 2018-06-13 NOTE — PROGRESS NOTES
Date of Outreach Update:  Anya Arana was seen and assessed. MEWS Score: 1 (06/13/18 0300)  Vitals:    06/13/18 0300 06/13/18 0715 06/13/18 1108 06/13/18 1525   BP: 130/75 136/78 120/72 144/71   Pulse: 79 78 79 77   Resp: 16 18 18 18   Temp: 98 °F (36.7 °C) 98.4 °F (36.9 °C) 98.8 °F (37.1 °C) 98.7 °F (37.1 °C)   SpO2: 96% 96% 96% 97%   Weight:       Height:             Pain Assessment  Pain Intensity 1: 3 (06/13/18 1027)  Pain Location 1: Back  Pain Intervention(s) 1: Emotional support  Patient Stated Pain Goal: 3      Previous Outreach assessment has been reviewed. There have been no significant clinical changes since the completion of the last dated Outreach assessment. Brace was adjusted so patient states she is more comfortable. Patient states she is supposed to go home tomorrow. No concerns. Follow up complete per outreach protocol. Will be available if needed.     Signed By:   Alex Abel    June 13, 2018 5:33 PM

## 2018-06-13 NOTE — PROGRESS NOTES
Date of Outreach Update:  Alonso Flores was seen and assessed. MEWS Score: 1 (06/13/18 0300)  Vitals:    06/12/18 1900 06/12/18 2300 06/13/18 0300 06/13/18 0715   BP: 146/78 112/65 130/75 136/78   Pulse: 79 81 79 78   Resp: 18 17 16 18   Temp: 99 °F (37.2 °C) 98 °F (36.7 °C) 98 °F (36.7 °C) 98.4 °F (36.9 °C)   SpO2: 98% 96% 96% 96%   Weight:       Height:             Pain Assessment  Pain Intensity 1: 3 (06/13/18 1027)  Pain Location 1: Back  Pain Intervention(s) 1: Emotional support  Patient Stated Pain Goal: 3      Previous Outreach assessment has been reviewed. There have been no significant clinical changes since the completion of the last dated Outreach assessment. Patient resting in bed on room air with an O2 sat of 97% and HR 81. Patient's only concern was that her back brace is uncomfortable and that this issue had been addressed with her primary RN. Patient denies pain. Lung sounds clear bilaterally. Patient A & O x 4. Will continue to follow up per outreach protocol.     Signed By:   Braulio Bhandari    June 13, 2018 12:04 PM

## 2018-06-13 NOTE — PROGRESS NOTES
END OF SHIFT NOTE:    INTAKE/OUTPUT  06/12 0701 - 06/13 0700  In: 56 [P.O.:120; I.V.:900]  Out: 1800 [Urine:1800]  Voiding: YES  Catheter: NO  Drain:              Flatus: Patient does have flatus present. Stool:  0 occurrences. Characteristics:       Emesis: 0 occurrences. Characteristics:   Emesis Assessment  Appearance: Green  Emesis Amount: Medium    VITAL SIGNS  Patient Vitals for the past 12 hrs:   Temp Pulse Resp BP SpO2   06/13/18 1525 98.7 °F (37.1 °C) 77 18 144/71 97 %   06/13/18 1108 98.8 °F (37.1 °C) 79 18 120/72 96 %       Pain Assessment  Pain Intensity 1: 6 (06/13/18 1747)  Pain Location 1: Generalized  Pain Intervention(s) 1: Medication (see MAR)  Patient Stated Pain Goal: 3    Ambulating  Yes with rolling walker and stand by assist.    Shift report given to oncoming nurse at the bedside.     Niko Montiel RN

## 2018-06-13 NOTE — PROGRESS NOTES
[facility] CRITICAL CARE OUTREACH NURSE PROGRESS REPORT      SUBJECTIVE: Called to assess patient secondary to recent transfer from ICU. MEWS Score: 1 (06/12/18 0310)  Vitals:    06/12/18 0715 06/12/18 1124 06/12/18 1515 06/12/18 1900   BP: 112/72 110/74 126/70 146/78   Pulse: 74 78 77 79   Resp: 18 19 18 18   Temp: 98.8 °F (37.1 °C) 98.9 °F (37.2 °C) 99 °F (37.2 °C) 99 °F (37.2 °C)   SpO2: 95% 97% 98% 98%   Weight:       Height:              LAB DATA:    Recent Labs      06/12/18   0912  06/11/18   0440  06/10/18   0432   NA  140  141  140   K  3.8  4.2  4.1   CL  108*  109*  107   CO2  24  21  26   AGAP  8  11  7   GLU  117*  92  131*   BUN  5*  9  14   CREA  0.63  0.61  0.79   GFRAA  >60  >60  >60   GFRNA  >60  >60  >60   CA  7.9*  7.3*  8.1*        Recent Labs      06/12/18   1539  06/12/18   0545  06/11/18   1705  06/11/18   0440   06/10/18   0432   WBC   --   5.4   --   6.5   --   8.4   HGB  11.4*  10.3*  10.9*  11.2*   < >  11.6*   HCT  35.2*  32.7*  33.4*  34.1*   < >  36.2   PLT   --   171   --   191   --   221    < > = values in this interval not displayed. OBJECTIVE: On arrival to room, I found patient to be resting in bed watching tv. Pain Assessment  Pain Intensity 1: 1 (06/12/18 1053)  Pain Location 1: Back  Pain Intervention(s) 1: Emotional support  Patient Stated Pain Goal: 3                                 ASSESSMENT:  Pt alert and oriented. HR 79, O2 sats 98% on RA. Pt states she is having pain 6/10 from getting back into bed from the recliner. Pt states she would rather not take pain medication for her pain. PLAN:  Continue to monitor per outreach protocol.

## 2018-06-13 NOTE — PROGRESS NOTES
Date of Outreach Update:  Mikayla Concepcion was seen and assessed. MEWS Score: 1 (06/12/18 0310)  Vitals:    06/12/18 1124 06/12/18 1515 06/12/18 1900 06/12/18 2300   BP: 110/74 126/70 146/78 112/65   Pulse: 78 77 79 81   Resp: 19 18 18 17   Temp: 98.9 °F (37.2 °C) 99 °F (37.2 °C) 99 °F (37.2 °C) 98 °F (36.7 °C)   SpO2: 97% 98% 98% 96%   Weight:       Height:             Pain Assessment  Pain Intensity 1: 5 (06/12/18 2152)  Pain Location 1: Back  Pain Intervention(s) 1: Medication (see MAR)  Patient Stated Pain Goal: 3      Previous Outreach assessment has been reviewed. There have been no significant clinical changes since the completion of the last dated Outreach assessment.   Pt sleeping     Signed By:   Carla Thompson    June 13, 2018 2:07 AM

## 2018-06-13 NOTE — PROGRESS NOTES
C/o TSLO brace uncomfortable and too high on neck. Was adjusted by [facility] this am. Placed call to [facility] and spoke to Marylen Mulberry.  Will try to get someone out tonight, if not will be early am.
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
## 2018-06-13 NOTE — PROGRESS NOTES
OT note:  Pt just worked with PT and stated that PT addressed self care needs as well as mobility. Pt is scheduled to be discharged tomorrow with [place]. Will follow.   Jolynn Jesus

## 2018-06-13 NOTE — PROGRESS NOTES
Dispo update:  Spoke to Ms. Cora Napoles and her  in room 212 again about discharge planning. Agreed to [facility] OT and PT. Order, face to face, and referral placed into EPIC/CC link. Also ordered rolling walker (she declined 3 in 1 [facility]) from [facility] (fax [phone]; phone [phone]) for delivery to room 212.

## 2018-06-14 VITALS
HEIGHT: 62 IN | BODY MASS INDEX: 33.19 KG/M2 | TEMPERATURE: 98.5 F | DIASTOLIC BLOOD PRESSURE: 77 MMHG | OXYGEN SATURATION: 97 % | SYSTOLIC BLOOD PRESSURE: 138 MMHG | WEIGHT: 180.34 LBS | HEART RATE: 78 BPM | RESPIRATION RATE: 16 BRPM

## 2018-06-14 PROCEDURE — 74011250637 HC RX REV CODE- 250/637: Performed by: SURGERY

## 2018-06-14 RX ORDER — TRAMADOL HYDROCHLORIDE 50 MG/1
TABLET ORAL
Qty: 20 TAB | Refills: 0 | Status: SHIPPED | OUTPATIENT
Start: 2018-06-14 | End: 2019-08-14

## 2018-06-14 RX ADMIN — CARVEDILOL 25 MG: 25 TABLET, FILM COATED ORAL at 08:59

## 2018-06-14 RX ADMIN — Medication 400 MG: at 08:59

## 2018-06-14 RX ADMIN — FLUOXETINE 10 MG: 10 CAPSULE ORAL at 08:59

## 2018-06-14 RX ADMIN — PANTOPRAZOLE SODIUM 40 MG: 40 TABLET, DELAYED RELEASE ORAL at 06:01

## 2018-06-14 RX ADMIN — ESTRADIOL 2 MG: 1 TABLET ORAL at 08:59

## 2018-06-14 RX ADMIN — Medication 10 ML: at 06:02
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
## 2018-06-14 NOTE — PROGRESS NOTES
Discharge paperwork given to patient at this time. She verbalizes understanding of medications, follow up appointments and instructions. Patient declined the ordered Boostrix vaccine stating that she had that 3 weeks ago at Dr. Yara Coyne office. IV DC'd with cath tip intact. Spoke with Tim Cano, case management regarding PT/OT, rolling walker, reacher and sock aid. He states not to worry about the reacher or sock aid, that he has already ordered the rolling walker, and that home PT/OT will contact the patient with their first appointment. The rolling walker is not in the room, JULIUS Marie will find out if this is being delivered to the room or home and let the patient know.

## 2018-06-14 NOTE — DISCHARGE INSTRUCTIONS
DISCHARGE SUMMARY from Nurse    PATIENT INSTRUCTIONS:    After general anesthesia or intravenous sedation, for 24 hours or while taking prescription Narcotics:  · Limit your activities  · Do not drive and operate hazardous machinery  · Do not make important personal or business decisions  · Do  not drink alcoholic beverages  · If you have not urinated within 8 hours after discharge, please contact your surgeon on call. Report the following to your surgeon:  · Excessive pain, swelling, redness or odor of or around the surgical area  · Temperature over 100.5  · Nausea and vomiting lasting longer than 4 hours or if unable to take medications  · Any signs of decreased circulation or nerve impairment to extremity: change in color, persistent  numbness, tingling, coldness or increase pain  · Any questions      *  Please give a list of your current medications to your Primary Care Provider. *  Please update this list whenever your medications are discontinued, doses are      changed, or new medications (including over-the-counter products) are added. *  Please carry medication information at all times in case of emergency situations. These are general instructions for a healthy lifestyle:    No smoking/ No tobacco products/ Avoid exposure to second hand smoke  Surgeon General's Warning:  Quitting smoking now greatly reduces serious risk to your health. Obesity, smoking, and sedentary lifestyle greatly increases your risk for illness    A healthy diet, regular physical exercise & weight monitoring are important for maintaining a healthy lifestyle    You may be retaining fluid if you have a history of heart failure or if you experience any of the following symptoms:  Weight gain of 3 pounds or more overnight or 5 pounds in a week, increased swelling in our hands or feet or shortness of breath while lying flat in bed.   Please call your doctor as soon as you notice any of these symptoms; do not wait until your next office visit. Recognize signs and symptoms of STROKE:    F-face looks uneven    A-arms unable to move or move unevenly    S-speech slurred or non-existent    T-time-call 911 as soon as signs and symptoms begin-DO NOT go       Back to bed or wait to see if you get better-TIME IS BRAIN. Warning Signs of HEART ATTACK     Call 911 if you have these symptoms:   Chest discomfort. Most heart attacks involve discomfort in the center of the chest that lasts more than a few minutes, or that goes away and comes back. It can feel like uncomfortable pressure, squeezing, fullness, or pain.  Discomfort in other areas of the upper body. Symptoms can include pain or discomfort in one or both arms, the back, neck, jaw, or stomach.  Shortness of breath with or without chest discomfort.  Other signs may include breaking out in a cold sweat, nausea, or lightheadedness. Don't wait more than five minutes to call 911 - MINUTES MATTER! Fast action can save your life. Calling 911 is almost always the fastest way to get lifesaving treatment. Emergency Medical Services staff can begin treatment when they arrive -- up to an hour sooner than if someone gets to the hospital by car. The discharge information has been reviewed with the patient. The patient verbalized understanding. Discharge medications reviewed with the patient and appropriate educational materials and side effects teaching were provided. ___________________________________________________________________________________________________________________________________  Discharge Instructions/Follow-up Plans:   MD Instructions:     Follow-up with Dr. Angelita Barry in 1-2 weeks. Follow-up with physician that monitors coumadin dosing ASAP.   TLSO brace when out of bed or in chair but does not need the brace at all times     Diet - as tolerated  Activity - ambulate - no heavy lifting >5lb until seen by neurosurgery  May shower     No driving while taking narcotics. Do not drink alcohol while taking narcotics.   Resume other home medications.      If problems or questions arise, please call our office at [phone].     Greater than 30 minutes were spent discharging the patient

## 2018-06-14 NOTE — PROGRESS NOTES
Problem: Falls - Risk of  Goal: *Absence of Falls  Document Shaji Fall Risk and appropriate interventions in the flowsheet.    Outcome: Progressing Towards Goal  Fall Risk Interventions:  Mobility Interventions: Utilize walker, cane, or other assitive device         Medication Interventions: Teach patient to arise slowly    Elimination Interventions: Call light in reach

## 2018-06-14 NOTE — PROGRESS NOTES
Problem: Falls - Risk of  Goal: *Absence of Falls  Document Shaji Fall Risk and appropriate interventions in the flowsheet.    Outcome: Progressing Towards Goal  Fall Risk Interventions:  Mobility Interventions: Utilize walker, cane, or other assitive device         Medication Interventions: Evaluate medications/consider consulting pharmacy    Elimination Interventions: Call light in reach

## 2018-06-15 ENCOUNTER — HOME CARE VISIT (OUTPATIENT)
Dept: SCHEDULING | Facility: HOME HEALTH | Age: 68
End: 2018-06-15
Payer: MEDICARE

## 2018-06-15 PROCEDURE — 400013 HH SOC

## 2018-06-15 PROCEDURE — 3331090002 HH PPS REVENUE DEBIT

## 2018-06-15 PROCEDURE — 3331090001 HH PPS REVENUE CREDIT

## 2018-06-15 PROCEDURE — G0151 HHCP-SERV OF PT,EA 15 MIN: HCPCS

## 2018-06-16 PROCEDURE — 3331090001 HH PPS REVENUE CREDIT

## 2018-06-16 PROCEDURE — 3331090002 HH PPS REVENUE DEBIT

## 2018-06-17 PROCEDURE — 3331090001 HH PPS REVENUE CREDIT

## 2018-06-17 PROCEDURE — 3331090002 HH PPS REVENUE DEBIT

## 2018-06-18 ENCOUNTER — HOME CARE VISIT (OUTPATIENT)
Dept: SCHEDULING | Facility: HOME HEALTH | Age: 68
End: 2018-06-18
Payer: MEDICARE

## 2018-06-18 VITALS
HEART RATE: 76 BPM | RESPIRATION RATE: 15 BRPM | OXYGEN SATURATION: 98 % | TEMPERATURE: 98 F | SYSTOLIC BLOOD PRESSURE: 130 MMHG | DIASTOLIC BLOOD PRESSURE: 70 MMHG

## 2018-06-18 VITALS
DIASTOLIC BLOOD PRESSURE: 70 MMHG | TEMPERATURE: 98.1 F | HEART RATE: 78 BPM | SYSTOLIC BLOOD PRESSURE: 140 MMHG | RESPIRATION RATE: 16 BRPM

## 2018-06-18 PROCEDURE — 3331090002 HH PPS REVENUE DEBIT

## 2018-06-18 PROCEDURE — G0157 HHC PT ASSISTANT EA 15: HCPCS

## 2018-06-18 PROCEDURE — 3331090001 HH PPS REVENUE CREDIT

## 2018-06-18 PROCEDURE — G0299 HHS/HOSPICE OF RN EA 15 MIN: HCPCS

## 2018-06-19 ENCOUNTER — HOME CARE VISIT (OUTPATIENT)
Dept: SCHEDULING | Facility: HOME HEALTH | Age: 68
End: 2018-06-19
Payer: MEDICARE

## 2018-06-19 VITALS
SYSTOLIC BLOOD PRESSURE: 130 MMHG | HEART RATE: 78 BPM | OXYGEN SATURATION: 95 % | DIASTOLIC BLOOD PRESSURE: 80 MMHG | RESPIRATION RATE: 16 BRPM | TEMPERATURE: 97.4 F

## 2018-06-19 PROCEDURE — 3331090001 HH PPS REVENUE CREDIT

## 2018-06-19 PROCEDURE — 3331090002 HH PPS REVENUE DEBIT

## 2018-06-19 PROCEDURE — G0152 HHCP-SERV OF OT,EA 15 MIN: HCPCS

## 2018-06-20 ENCOUNTER — HOME CARE VISIT (OUTPATIENT)
Dept: SCHEDULING | Facility: HOME HEALTH | Age: 68
End: 2018-06-20
Payer: MEDICARE

## 2018-06-20 VITALS
DIASTOLIC BLOOD PRESSURE: 70 MMHG | HEART RATE: 80 BPM | RESPIRATION RATE: 18 BRPM | SYSTOLIC BLOOD PRESSURE: 120 MMHG | OXYGEN SATURATION: 98 % | TEMPERATURE: 97.9 F

## 2018-06-20 PROBLEM — R93.1 ABNORMAL NUCLEAR CARDIAC IMAGING TEST: Status: ACTIVE | Noted: 2018-06-20

## 2018-06-20 PROBLEM — R07.2 PRECORDIAL PAIN: Status: ACTIVE | Noted: 2018-06-20

## 2018-06-20 PROCEDURE — A6402 STERILE GAUZE <= 16 SQ IN: HCPCS

## 2018-06-20 PROCEDURE — 3331090002 HH PPS REVENUE DEBIT

## 2018-06-20 PROCEDURE — 3331090001 HH PPS REVENUE CREDIT

## 2018-06-20 PROCEDURE — G0151 HHCP-SERV OF PT,EA 15 MIN: HCPCS

## 2018-06-20 PROCEDURE — A6234 HYDROCOLLD DRG <=16 W/O BDR: HCPCS

## 2018-06-20 PROCEDURE — A6260 WOUND CLEANSER ANY TYPE/SIZE: HCPCS

## 2018-06-20 PROCEDURE — A5120 SKIN BARRIER, WIPE OR SWAB: HCPCS

## 2018-06-21 PROCEDURE — 3331090001 HH PPS REVENUE CREDIT

## 2018-06-21 PROCEDURE — 3331090002 HH PPS REVENUE DEBIT

## 2018-06-22 ENCOUNTER — HOME CARE VISIT (OUTPATIENT)
Dept: SCHEDULING | Facility: HOME HEALTH | Age: 68
End: 2018-06-22
Payer: MEDICARE

## 2018-06-22 VITALS
OXYGEN SATURATION: 95 % | DIASTOLIC BLOOD PRESSURE: 80 MMHG | SYSTOLIC BLOOD PRESSURE: 150 MMHG | TEMPERATURE: 97.3 F | RESPIRATION RATE: 18 BRPM | HEART RATE: 80 BPM

## 2018-06-22 VITALS
SYSTOLIC BLOOD PRESSURE: 130 MMHG | DIASTOLIC BLOOD PRESSURE: 80 MMHG | RESPIRATION RATE: 17 BRPM | HEART RATE: 65 BPM | TEMPERATURE: 98 F

## 2018-06-22 PROCEDURE — 3331090002 HH PPS REVENUE DEBIT

## 2018-06-22 PROCEDURE — 3331090001 HH PPS REVENUE CREDIT

## 2018-06-22 PROCEDURE — G0157 HHC PT ASSISTANT EA 15: HCPCS

## 2018-06-22 PROCEDURE — G0158 HHC OT ASSISTANT EA 15: HCPCS

## 2018-06-23 ENCOUNTER — HOME CARE VISIT (OUTPATIENT)
Dept: HOME HEALTH SERVICES | Facility: HOME HEALTH | Age: 68
End: 2018-06-23
Payer: MEDICARE

## 2018-06-23 PROCEDURE — 3331090001 HH PPS REVENUE CREDIT

## 2018-06-23 PROCEDURE — 3331090002 HH PPS REVENUE DEBIT

## 2018-06-24 PROCEDURE — 3331090001 HH PPS REVENUE CREDIT

## 2018-06-24 PROCEDURE — 3331090002 HH PPS REVENUE DEBIT

## 2018-06-25 ENCOUNTER — HOME CARE VISIT (OUTPATIENT)
Dept: SCHEDULING | Facility: HOME HEALTH | Age: 68
End: 2018-06-25
Payer: MEDICARE

## 2018-06-25 VITALS
OXYGEN SATURATION: 96 % | SYSTOLIC BLOOD PRESSURE: 126 MMHG | TEMPERATURE: 98 F | DIASTOLIC BLOOD PRESSURE: 82 MMHG | RESPIRATION RATE: 17 BRPM | HEART RATE: 70 BPM

## 2018-06-25 PROCEDURE — 3331090002 HH PPS REVENUE DEBIT

## 2018-06-25 PROCEDURE — G0158 HHC OT ASSISTANT EA 15: HCPCS

## 2018-06-25 PROCEDURE — 3331090001 HH PPS REVENUE CREDIT

## 2018-06-26 ENCOUNTER — HOME CARE VISIT (OUTPATIENT)
Dept: SCHEDULING | Facility: HOME HEALTH | Age: 68
End: 2018-06-26
Payer: MEDICARE

## 2018-06-26 VITALS
HEART RATE: 76 BPM | TEMPERATURE: 97 F | RESPIRATION RATE: 15 BRPM | OXYGEN SATURATION: 98 % | DIASTOLIC BLOOD PRESSURE: 80 MMHG | SYSTOLIC BLOOD PRESSURE: 138 MMHG

## 2018-06-26 PROCEDURE — G0157 HHC PT ASSISTANT EA 15: HCPCS

## 2018-06-26 PROCEDURE — G0299 HHS/HOSPICE OF RN EA 15 MIN: HCPCS

## 2018-06-26 PROCEDURE — 3331090001 HH PPS REVENUE CREDIT

## 2018-06-26 PROCEDURE — 3331090002 HH PPS REVENUE DEBIT

## 2018-06-27 ENCOUNTER — HOME CARE VISIT (OUTPATIENT)
Dept: SCHEDULING | Facility: HOME HEALTH | Age: 68
End: 2018-06-27
Payer: MEDICARE

## 2018-06-27 VITALS
RESPIRATION RATE: 16 BRPM | HEART RATE: 70 BPM | DIASTOLIC BLOOD PRESSURE: 70 MMHG | TEMPERATURE: 98.1 F | SYSTOLIC BLOOD PRESSURE: 140 MMHG

## 2018-06-27 VITALS
DIASTOLIC BLOOD PRESSURE: 84 MMHG | RESPIRATION RATE: 17 BRPM | SYSTOLIC BLOOD PRESSURE: 130 MMHG | HEART RATE: 80 BPM | TEMPERATURE: 97.8 F | OXYGEN SATURATION: 96 %

## 2018-06-27 PROCEDURE — G0158 HHC OT ASSISTANT EA 15: HCPCS

## 2018-06-27 PROCEDURE — 3331090001 HH PPS REVENUE CREDIT

## 2018-06-27 PROCEDURE — 3331090002 HH PPS REVENUE DEBIT

## 2018-06-28 ENCOUNTER — HOME CARE VISIT (OUTPATIENT)
Dept: SCHEDULING | Facility: HOME HEALTH | Age: 68
End: 2018-06-28
Payer: MEDICARE

## 2018-06-28 VITALS
TEMPERATURE: 97.3 F | HEART RATE: 68 BPM | SYSTOLIC BLOOD PRESSURE: 120 MMHG | DIASTOLIC BLOOD PRESSURE: 60 MMHG | RESPIRATION RATE: 18 BRPM

## 2018-06-28 VITALS
TEMPERATURE: 97.7 F | DIASTOLIC BLOOD PRESSURE: 84 MMHG | SYSTOLIC BLOOD PRESSURE: 132 MMHG | RESPIRATION RATE: 16 BRPM | HEART RATE: 66 BPM | OXYGEN SATURATION: 98 %

## 2018-06-28 PROBLEM — S22.000A CLOSED COMPRESSION FRACTURE OF THORACIC VERTEBRA (HCC): Status: ACTIVE | Noted: 2018-06-28

## 2018-06-28 LAB
INR BLD: 1.8 (ref 0.9–1.1)
PT POC: 0 SECONDS (ref 11.8–14.9)

## 2018-06-28 PROCEDURE — 3331090001 HH PPS REVENUE CREDIT

## 2018-06-28 PROCEDURE — 3331090002 HH PPS REVENUE DEBIT

## 2018-06-28 PROCEDURE — G0299 HHS/HOSPICE OF RN EA 15 MIN: HCPCS

## 2018-06-28 PROCEDURE — G0157 HHC PT ASSISTANT EA 15: HCPCS

## 2018-06-29 ENCOUNTER — HOME CARE VISIT (OUTPATIENT)
Dept: SCHEDULING | Facility: HOME HEALTH | Age: 68
End: 2018-06-29
Payer: MEDICARE

## 2018-06-29 PROCEDURE — G0157 HHC PT ASSISTANT EA 15: HCPCS

## 2018-06-29 PROCEDURE — 3331090001 HH PPS REVENUE CREDIT

## 2018-06-29 PROCEDURE — 3331090002 HH PPS REVENUE DEBIT

## 2018-06-30 PROCEDURE — 3331090002 HH PPS REVENUE DEBIT

## 2018-06-30 PROCEDURE — 3331090001 HH PPS REVENUE CREDIT

## 2018-07-01 PROCEDURE — 3331090001 HH PPS REVENUE CREDIT

## 2018-07-01 PROCEDURE — 3331090002 HH PPS REVENUE DEBIT

## 2018-07-02 ENCOUNTER — HOME CARE VISIT (OUTPATIENT)
Dept: SCHEDULING | Facility: HOME HEALTH | Age: 68
End: 2018-07-02
Payer: MEDICARE

## 2018-07-02 VITALS
TEMPERATURE: 96.8 F | RESPIRATION RATE: 18 BRPM | DIASTOLIC BLOOD PRESSURE: 78 MMHG | SYSTOLIC BLOOD PRESSURE: 140 MMHG | HEART RATE: 68 BPM

## 2018-07-02 VITALS
RESPIRATION RATE: 16 BRPM | SYSTOLIC BLOOD PRESSURE: 144 MMHG | DIASTOLIC BLOOD PRESSURE: 90 MMHG | OXYGEN SATURATION: 99 % | TEMPERATURE: 96.9 F | HEART RATE: 70 BPM

## 2018-07-02 PROCEDURE — 3331090002 HH PPS REVENUE DEBIT

## 2018-07-02 PROCEDURE — 3331090001 HH PPS REVENUE CREDIT

## 2018-07-02 PROCEDURE — G0152 HHCP-SERV OF OT,EA 15 MIN: HCPCS

## 2018-07-03 ENCOUNTER — HOME CARE VISIT (OUTPATIENT)
Dept: SCHEDULING | Facility: HOME HEALTH | Age: 68
End: 2018-07-03
Payer: MEDICARE

## 2018-07-03 PROCEDURE — 3331090001 HH PPS REVENUE CREDIT

## 2018-07-03 PROCEDURE — G0157 HHC PT ASSISTANT EA 15: HCPCS

## 2018-07-03 PROCEDURE — 3331090002 HH PPS REVENUE DEBIT

## 2018-07-04 VITALS
DIASTOLIC BLOOD PRESSURE: 80 MMHG | TEMPERATURE: 97 F | RESPIRATION RATE: 18 BRPM | SYSTOLIC BLOOD PRESSURE: 122 MMHG | HEART RATE: 70 BPM

## 2018-07-04 PROCEDURE — 3331090001 HH PPS REVENUE CREDIT

## 2018-07-04 PROCEDURE — 3331090002 HH PPS REVENUE DEBIT

## 2018-07-05 PROCEDURE — 3331090001 HH PPS REVENUE CREDIT

## 2018-07-05 PROCEDURE — 3331090002 HH PPS REVENUE DEBIT

## 2018-07-06 ENCOUNTER — HOME CARE VISIT (OUTPATIENT)
Dept: SCHEDULING | Facility: HOME HEALTH | Age: 68
End: 2018-07-06
Payer: MEDICARE

## 2018-07-06 VITALS
TEMPERATURE: 97.6 F | OXYGEN SATURATION: 98 % | HEART RATE: 68 BPM | RESPIRATION RATE: 16 BRPM | DIASTOLIC BLOOD PRESSURE: 70 MMHG | SYSTOLIC BLOOD PRESSURE: 120 MMHG

## 2018-07-06 VITALS
TEMPERATURE: 97.8 F | RESPIRATION RATE: 18 BRPM | SYSTOLIC BLOOD PRESSURE: 132 MMHG | DIASTOLIC BLOOD PRESSURE: 82 MMHG | OXYGEN SATURATION: 98 % | HEART RATE: 66 BPM

## 2018-07-06 LAB
INR BLD: 3 (ref 0.9–1.1)
PT POC: 0 SECONDS (ref 11.8–14.9)

## 2018-07-06 PROCEDURE — G0151 HHCP-SERV OF PT,EA 15 MIN: HCPCS

## 2018-07-06 PROCEDURE — 3331090001 HH PPS REVENUE CREDIT

## 2018-07-06 PROCEDURE — G0299 HHS/HOSPICE OF RN EA 15 MIN: HCPCS

## 2018-07-06 PROCEDURE — 3331090002 HH PPS REVENUE DEBIT

## 2018-07-07 PROCEDURE — 3331090002 HH PPS REVENUE DEBIT

## 2018-07-07 PROCEDURE — 3331090001 HH PPS REVENUE CREDIT

## 2018-07-08 PROCEDURE — 3331090002 HH PPS REVENUE DEBIT

## 2018-07-08 PROCEDURE — 3331090001 HH PPS REVENUE CREDIT

## 2018-07-09 PROCEDURE — 3331090002 HH PPS REVENUE DEBIT

## 2018-07-09 PROCEDURE — 3331090001 HH PPS REVENUE CREDIT

## 2018-07-10 PROCEDURE — 3331090002 HH PPS REVENUE DEBIT

## 2018-07-10 PROCEDURE — 3331090001 HH PPS REVENUE CREDIT

## 2018-07-11 PROCEDURE — 3331090002 HH PPS REVENUE DEBIT

## 2018-07-11 PROCEDURE — 3331090001 HH PPS REVENUE CREDIT

## 2018-07-12 ENCOUNTER — HOME CARE VISIT (OUTPATIENT)
Dept: SCHEDULING | Facility: HOME HEALTH | Age: 68
End: 2018-07-12
Payer: MEDICARE

## 2018-07-12 VITALS
DIASTOLIC BLOOD PRESSURE: 76 MMHG | HEART RATE: 78 BPM | OXYGEN SATURATION: 98 % | SYSTOLIC BLOOD PRESSURE: 114 MMHG | TEMPERATURE: 98.1 F | RESPIRATION RATE: 18 BRPM

## 2018-07-12 LAB
INR BLD: 3.9 (ref 0.9–1.1)
PT POC: 46.3 SECONDS (ref 11.8–14.9)

## 2018-07-12 PROCEDURE — 3331090003 HH PPS REVENUE ADJ

## 2018-07-12 PROCEDURE — G0299 HHS/HOSPICE OF RN EA 15 MIN: HCPCS

## 2018-07-12 PROCEDURE — 3331090001 HH PPS REVENUE CREDIT

## 2018-07-12 PROCEDURE — 3331090002 HH PPS REVENUE DEBIT

## 2018-08-09 ENCOUNTER — HOSPITAL ENCOUNTER (OUTPATIENT)
Dept: GENERAL RADIOLOGY | Age: 68
Discharge: HOME | End: 2018-08-09
Attending: NEUROLOGICAL SURGERY
Payer: MEDICARE

## 2018-08-09 DIAGNOSIS — S22.000A CLOSED COMPRESSION FRACTURE OF THORACIC VERTEBRA, INITIAL ENCOUNTER (HCC): ICD-10-CM

## 2018-08-09 PROCEDURE — 72070 X-RAY EXAM THORAC SPINE 2VWS: CPT

## 2018-08-17 ENCOUNTER — HOSPITAL ENCOUNTER (OUTPATIENT)
Dept: LAB | Age: 68
Discharge: HOME | End: 2018-08-17
Payer: MEDICARE

## 2018-08-17 DIAGNOSIS — R93.1 ABNORMAL NUCLEAR CARDIAC IMAGING TEST: ICD-10-CM

## 2018-08-17 LAB
ANION GAP SERPL CALC-SCNC: 9 MMOL/L (ref 7–16)
BASOPHILS # BLD: 0 K/UL
BASOPHILS NFR BLD: 0 % (ref 0–2)
BUN SERPL-MCNC: 16 MG/DL (ref 8–23)
CALCIUM SERPL-MCNC: 8.6 MG/DL (ref 8.3–10.4)
CHLORIDE SERPL-SCNC: 104 MMOL/L (ref 98–107)
CO2 SERPL-SCNC: 29 MMOL/L (ref 21–32)
CREAT SERPL-MCNC: 0.7 MG/DL (ref 0.6–1)
DIFFERENTIAL METHOD BLD: ABNORMAL
EOSINOPHIL # BLD: 0 K/UL
EOSINOPHIL NFR BLD: 1 % (ref 0.5–7.8)
ERYTHROCYTE [DISTWIDTH] IN BLOOD BY AUTOMATED COUNT: 14.1 %
GLUCOSE SERPL-MCNC: 95 MG/DL (ref 65–100)
HCT VFR BLD AUTO: 41.5 % (ref 35.8–46.3)
HGB BLD-MCNC: 13 G/DL (ref 11.7–15.4)
IMM GRANULOCYTES # BLD: 0 K/UL
IMM GRANULOCYTES NFR BLD AUTO: 0 % (ref 0–5)
INR PPP: 1.8
LYMPHOCYTES # BLD: 0.7 K/UL
LYMPHOCYTES NFR BLD: 12 % (ref 13–44)
MCH RBC QN AUTO: 29.6 PG (ref 26.1–32.9)
MCHC RBC AUTO-ENTMCNC: 31.3 G/DL (ref 31.4–35)
MCV RBC AUTO: 94.5 FL (ref 79.6–97.8)
MONOCYTES # BLD: 0.6 K/UL
MONOCYTES NFR BLD: 9 % (ref 4–12)
NEUTS SEG # BLD: 4.9 K/UL
NEUTS SEG NFR BLD: 78 % (ref 43–78)
NRBC # BLD: 0 K/UL (ref 0–0.2)
PLATELET # BLD AUTO: 234 K/UL (ref 150–450)
PMV BLD AUTO: 10.6 FL (ref 9.4–12.3)
POTASSIUM SERPL-SCNC: 4.2 MMOL/L (ref 3.5–5.1)
PROTHROMBIN TIME: 20.4 SEC (ref 11.5–14.5)
RBC # BLD AUTO: 4.39 M/UL (ref 4.05–5.2)
SODIUM SERPL-SCNC: 142 MMOL/L (ref 136–145)
WBC # BLD AUTO: 6.2 K/UL (ref 4.3–11.1)

## 2018-08-17 PROCEDURE — 36415 COLL VENOUS BLD VENIPUNCTURE: CPT

## 2018-08-17 PROCEDURE — 85025 COMPLETE CBC W/AUTO DIFF WBC: CPT

## 2018-08-17 PROCEDURE — 80048 BASIC METABOLIC PNL TOTAL CA: CPT

## 2018-08-17 PROCEDURE — 85610 PROTHROMBIN TIME: CPT

## 2018-08-20 ENCOUNTER — HOSPITAL ENCOUNTER (OUTPATIENT)
Dept: CARDIAC CATH/INVASIVE PROCEDURES | Age: 68
Discharge: HOME | End: 2018-08-20
Attending: INTERNAL MEDICINE | Admitting: INTERNAL MEDICINE
Payer: MEDICARE

## 2018-08-20 VITALS
HEIGHT: 62 IN | SYSTOLIC BLOOD PRESSURE: 117 MMHG | HEART RATE: 65 BPM | WEIGHT: 170 LBS | RESPIRATION RATE: 16 BRPM | TEMPERATURE: 97.8 F | OXYGEN SATURATION: 97 % | DIASTOLIC BLOOD PRESSURE: 66 MMHG | BODY MASS INDEX: 31.28 KG/M2

## 2018-08-20 LAB
ANION GAP SERPL CALC-SCNC: 11 MMOL/L (ref 7–16)
ATRIAL RATE: 67 BPM
BUN SERPL-MCNC: 13 MG/DL (ref 8–23)
CALCIUM SERPL-MCNC: 8.5 MG/DL (ref 8.3–10.4)
CALCULATED P AXIS, ECG09: 63 DEGREES
CALCULATED R AXIS, ECG10: -107 DEGREES
CALCULATED T AXIS, ECG11: 47 DEGREES
CHLORIDE SERPL-SCNC: 104 MMOL/L (ref 98–107)
CO2 SERPL-SCNC: 26 MMOL/L (ref 21–32)
CREAT SERPL-MCNC: 0.68 MG/DL (ref 0.6–1)
DIAGNOSIS, 93000: NORMAL
GLUCOSE SERPL-MCNC: 94 MG/DL (ref 65–100)
INR PPP: 1
P-R INTERVAL, ECG05: 180 MS
POTASSIUM SERPL-SCNC: 3.8 MMOL/L (ref 3.5–5.1)
PROTHROMBIN TIME: 12.5 SEC (ref 11.5–14.5)
Q-T INTERVAL, ECG07: 456 MS
QRS DURATION, ECG06: 130 MS
QTC CALCULATION (BEZET), ECG08: 481 MS
SODIUM SERPL-SCNC: 141 MMOL/L (ref 136–145)
VENTRICULAR RATE, ECG03: 67 BPM

## 2018-08-20 PROCEDURE — 74011636320 HC RX REV CODE- 636/320: Performed by: INTERNAL MEDICINE

## 2018-08-20 PROCEDURE — C1894 INTRO/SHEATH, NON-LASER: HCPCS

## 2018-08-20 PROCEDURE — 85610 PROTHROMBIN TIME: CPT

## 2018-08-20 PROCEDURE — 93458 L HRT ARTERY/VENTRICLE ANGIO: CPT

## 2018-08-20 PROCEDURE — C1769 GUIDE WIRE: HCPCS

## 2018-08-20 PROCEDURE — 74011250636 HC RX REV CODE- 250/636: Performed by: INTERNAL MEDICINE

## 2018-08-20 PROCEDURE — 77030004534 HC CATH ANGI DX INFN CARD -A

## 2018-08-20 PROCEDURE — 99152 MOD SED SAME PHYS/QHP 5/>YRS: CPT

## 2018-08-20 PROCEDURE — 74011000250 HC RX REV CODE- 250: Performed by: INTERNAL MEDICINE

## 2018-08-20 PROCEDURE — 93005 ELECTROCARDIOGRAM TRACING: CPT | Performed by: INTERNAL MEDICINE

## 2018-08-20 PROCEDURE — 74011250637 HC RX REV CODE- 250/637: Performed by: INTERNAL MEDICINE

## 2018-08-20 PROCEDURE — 80048 BASIC METABOLIC PNL TOTAL CA: CPT

## 2018-08-20 PROCEDURE — 77030019569 HC BND COMPR RAD TERU -B

## 2018-08-20 PROCEDURE — 74011250636 HC RX REV CODE- 250/636

## 2018-08-20 RX ORDER — MORPHINE SULFATE 2 MG/ML
1 INJECTION, SOLUTION INTRAMUSCULAR; INTRAVENOUS
Status: CANCELLED | OUTPATIENT
Start: 2018-08-20

## 2018-08-20 RX ORDER — SODIUM CHLORIDE 9 MG/ML
75 INJECTION, SOLUTION INTRAVENOUS CONTINUOUS
Status: DISCONTINUED | OUTPATIENT
Start: 2018-08-20 | End: 2018-08-20 | Stop reason: HOSPADM

## 2018-08-20 RX ORDER — NALOXONE HYDROCHLORIDE 0.4 MG/ML
0.4 INJECTION, SOLUTION INTRAMUSCULAR; INTRAVENOUS; SUBCUTANEOUS AS NEEDED
Status: CANCELLED | OUTPATIENT
Start: 2018-08-20

## 2018-08-20 RX ORDER — MIDAZOLAM HYDROCHLORIDE 1 MG/ML
.5-2 INJECTION, SOLUTION INTRAMUSCULAR; INTRAVENOUS
Status: DISCONTINUED | OUTPATIENT
Start: 2018-08-20 | End: 2018-08-20 | Stop reason: HOSPADM

## 2018-08-20 RX ORDER — HEPARIN SODIUM 200 [USP'U]/100ML
3 INJECTION, SOLUTION INTRAVENOUS CONTINUOUS
Status: DISCONTINUED | OUTPATIENT
Start: 2018-08-20 | End: 2018-08-20 | Stop reason: HOSPADM

## 2018-08-20 RX ORDER — HEPARIN SODIUM 10000 [USP'U]/ML
3000 INJECTION, SOLUTION INTRAVENOUS; SUBCUTANEOUS ONCE
Status: COMPLETED | OUTPATIENT
Start: 2018-08-20 | End: 2018-08-20

## 2018-08-20 RX ORDER — FENTANYL CITRATE 50 UG/ML
25-50 INJECTION, SOLUTION INTRAMUSCULAR; INTRAVENOUS
Status: DISCONTINUED | OUTPATIENT
Start: 2018-08-20 | End: 2018-08-20 | Stop reason: HOSPADM

## 2018-08-20 RX ORDER — ACETAMINOPHEN 325 MG/1
650 TABLET ORAL
Status: CANCELLED | OUTPATIENT
Start: 2018-08-20

## 2018-08-20 RX ORDER — SODIUM CHLORIDE 9 MG/ML
75 INJECTION, SOLUTION INTRAVENOUS CONTINUOUS
Status: CANCELLED | OUTPATIENT
Start: 2018-08-20

## 2018-08-20 RX ORDER — ONDANSETRON 2 MG/ML
4 INJECTION INTRAMUSCULAR; INTRAVENOUS
Status: CANCELLED | OUTPATIENT
Start: 2018-08-20 | End: 2018-08-21

## 2018-08-20 RX ORDER — DIAZEPAM 5 MG/1
5 TABLET ORAL ONCE
Status: COMPLETED | OUTPATIENT
Start: 2018-08-20 | End: 2018-08-20

## 2018-08-20 RX ORDER — SODIUM CHLORIDE 0.9 % (FLUSH) 0.9 %
5-10 SYRINGE (ML) INJECTION AS NEEDED
Status: CANCELLED | OUTPATIENT
Start: 2018-08-20

## 2018-08-20 RX ORDER — GUAIFENESIN 100 MG/5ML
81-324 LIQUID (ML) ORAL ONCE
Status: COMPLETED | OUTPATIENT
Start: 2018-08-20 | End: 2018-08-20

## 2018-08-20 RX ORDER — HYDROCODONE BITARTRATE AND ACETAMINOPHEN 5; 325 MG/1; MG/1
1 TABLET ORAL
Status: CANCELLED | OUTPATIENT
Start: 2018-08-20

## 2018-08-20 RX ORDER — LIDOCAINE HYDROCHLORIDE 10 MG/ML
6 INJECTION INFILTRATION; PERINEURAL ONCE
Status: COMPLETED | OUTPATIENT
Start: 2018-08-20 | End: 2018-08-20

## 2018-08-20 RX ORDER — SODIUM CHLORIDE 0.9 % (FLUSH) 0.9 %
5-10 SYRINGE (ML) INJECTION EVERY 8 HOURS
Status: CANCELLED | OUTPATIENT
Start: 2018-08-20

## 2018-08-20 RX ADMIN — MIDAZOLAM HYDROCHLORIDE 2 MG: 1 INJECTION, SOLUTION INTRAMUSCULAR; INTRAVENOUS at 13:00

## 2018-08-20 RX ADMIN — ASPIRIN 81 MG 324 MG: 81 TABLET ORAL at 08:01

## 2018-08-20 RX ADMIN — HEPARIN SODIUM 3000 UNITS: 10000 INJECTION, SOLUTION INTRAVENOUS; SUBCUTANEOUS at 13:26

## 2018-08-20 RX ADMIN — FENTANYL CITRATE 50 MCG: 50 INJECTION, SOLUTION INTRAMUSCULAR; INTRAVENOUS at 13:00

## 2018-08-20 RX ADMIN — LIDOCAINE HYDROCHLORIDE 6 ML: 10 INJECTION, SOLUTION INFILTRATION; PERINEURAL at 13:05

## 2018-08-20 RX ADMIN — DIAZEPAM 5 MG: 5 TABLET ORAL at 08:01

## 2018-08-20 RX ADMIN — IOPAMIDOL 100 ML: 755 INJECTION, SOLUTION INTRAVENOUS at 13:19

## 2018-08-20 RX ADMIN — HEPARIN SODIUM 2 ML: 10000 INJECTION, SOLUTION INTRAVENOUS; SUBCUTANEOUS at 13:09

## 2018-08-20 RX ADMIN — HEPARIN SODIUM 3 ML/HR: 200 INJECTION, SOLUTION INTRAVENOUS at 13:10

## 2018-08-20 RX ADMIN — SODIUM CHLORIDE 75 ML/HR: 900 INJECTION, SOLUTION INTRAVENOUS at 08:04

## 2018-08-20 NOTE — PROGRESS NOTES
Patient arrived and ambulated to room with no visual problems for planned LHC/poss with Dr. Colleen Culver. Consent signed and procedure discussed with patient. Time allow for patient to verbalize concerns, and concerns addressed with voiced understanding. Medications and history reviewed with patient. Patient prepped for procedure as ordered. Patient took Aspirin 325 mg at 0801, see medication record. The patient has a fraility score of 2-WELL, based on needs no assistance with ADL's and walks occasionally.

## 2018-08-20 NOTE — DISCHARGE INSTRUCTIONS
HEART CATHETERIZATION/ANGIOGRAPHY DISCHARGE INSTRUCTIONS    1. Check puncture site frequently for swelling or bleeding. If there is any bleeding, lie down and apply pressure over the area with a clean towel or washcloth. Notify your doctor for any redness, swelling, drainage, or oozing from the puncture site. Notify your doctor for any fever or chills. 2. If the extremity becomes cold, numb, or painful call [facility] Cardiology at [phone].  3. Activity should be limited for the next 48 hours. Climb stairs as little as possible and avoid any stooping, bending, or strenuous activity for 48 hours. No heavy lifting (anything over 10 pounds) for 3 days. 4. You may resume your usual diet. Drink more fluids than usual.  5. Have a responsible person drive you home and stay with you for at least 24 hours after your heart catheterization/angiography. 6. You may remove bandage from your Right wrist in 24 hours. You may shower in 24 hours. No tub baths, hot tubs, or swimming for 1 week. Do not place any lotions, creams, powders, or ointments over puncture site for 1 week. You may place a clean band-aid over the puncture site each day for 5 days. Change daily. I have read the above instructions and have had the opportunity to ask questions.       Patient: ________________________   Date: 8/20/2018    Witness: _______________________   Date: 8/20/2018

## 2018-08-20 NOTE — IP AVS SNAPSHOT
[address] 
[phone] Patient: Saud Gr MRN: [id number] :1950 Discharge Summary 2018 Saud Gr MRN[de-identified]  [id number] Admission Information Provider Pager Service Admission Date Expected D/C Date Bony Rubio MD  CARDIAC CATH LAB 2018 Actual LOS Patient Class 0 days OUTPATIENT Follow-up Information Follow up With Details Comments Contact Info Bony Rubio MD On 2018 Follow-up with Dr. Allyson Davis @ 11:30am in [address] [facility] [address] [facility] [address] 
[phone] My Medications ASK your physician about these medications Instructions Each Dose to Equal  
 Morning Noon Evening Bedtime CALTRATE 600+D PLUS MINERALS 600 mg calcium- 400 unit Tab Generic drug:  Calcium Carbonate-Vit D3-Min Take  by mouth daily. cholecalciferol (VITAMIN D3) 5,000 unit Tab tablet Commonly known as:  VITAMIN D3 Take  by mouth daily. COREG 25 mg tablet Generic drug:  carvedilol Take 25 mg by mouth two (2) times a day. 25 mg  
    
   
   
   
  
 ESTRACE 1 mg tablet Generic drug:  estradiol Take 2 mg by mouth daily. 2 mg FLUoxetine 10 mg tablet Commonly known as:  PROzac Take 10 mg by mouth daily. 10 mg  
    
   
   
   
  
 loratadine 10 mg tablet Commonly known as:  Joann Manny Take 10 mg by mouth. 10 mg LORazepam 0.5 mg tablet Commonly known as:  ATIVAN Take  by mouth.  
     
   
   
   
  
 lovastatin 20 mg tablet Commonly known as:  MEVACOR Take 40 mg by mouth daily. 40 mg  
    
   
   
   
  
 magnesium oxide 400 mg tablet Commonly known as:  MAG-OX Take 400 mg by mouth two (2) times a day.   
 400 mg  
    
   
   
 MYRBETRIQ 50 mg ER tablet Generic drug:  mirabegron ER Take 50 mg by mouth daily. 50 mg  
    
   
   
   
  
 nitroglycerin 0.4 mg SL tablet Commonly known as:  NITROSTAT  
   
 1 Tab by SubLINGual route every five (5) minutes as needed. 0.4 mg  
    
   
   
   
  
 omeprazole 40 mg capsule Commonly known as:  PRILOSEC Take 40 mg by mouth daily. 40 mg  
    
   
   
   
  
 SINGULAIR 10 mg tablet Generic drug:  montelukast  
   
 Take 10 mg by mouth daily. 10 mg  
    
   
   
   
  
 temazepam 15 mg capsule Commonly known as:  RESTORIL Take 15 mg by mouth nightly as needed. 15 mg  
    
   
   
   
  
 traMADol 50 mg tablet Commonly known as:  ULTRAM  
   
 1 tabs by mouth every 6 hours prn pain  Indications: Pain VITAMIN B-12 500 mcg tablet Generic drug:  cyanocobalamin Take 500 mcg by mouth. 500 mcg  
    
   
   
   
  
 warfarin 5 mg tablet Commonly known as:  COUMADIN Notes to Patient:  Start back your coumadin tonight Take 5 mg by mouth five (5) days a week. Wednesday, Thursday, Fri, Sat, Sunday  on Monday and Tues 2.5mg  
 5 mg General Information Please provide this summary of care documentation to your next provider. Allergies Unspecified:  Darvocet A500 [Propoxyphene N-acetaminophen]; Zithromax [Azithromycin] Current Immunizations  Never Reviewed Name Date  
 TB Skin Test (PPD) Intradermal 6/11/2018 Discharge Instructions Discharge Instructions HEART CATHETERIZATION/ANGIOGRAPHY DISCHARGE INSTRUCTIONS 1. Check puncture site frequently for swelling or bleeding. If there is any bleeding, lie down and apply pressure over the area with a clean towel or washcloth. Notify your doctor for any redness, swelling, drainage, or oozing from the puncture site. Notify your doctor for any fever or chills. 2. If the extremity becomes cold, numb, or painful call [address] Cardiology at [phone]. 
3. Activity should be limited for the next 48 hours. Climb stairs as little as possible and avoid any stooping, bending, or strenuous activity for 48 hours. No heavy lifting (anything over 10 pounds) for 3 days. 4. You may resume your usual diet. Drink more fluids than usual. 
5. Have a responsible person drive you home and stay with you for at least 24 hours after your heart catheterization/angiography. 6. You may remove bandage from your Right wrist in 24 hours. You may shower in 24 hours. No tub baths, hot tubs, or swimming for 1 week. Do not place any lotions, creams, powders, or ointments over puncture site for 1 week. You may place a clean band-aid over the puncture site each day for 5 days. Change daily. I have read the above instructions and have had the opportunity to ask questions. Patient: ________________________   Date: 8/20/2018 Witness: _______________________   Date: 8/20/2018 Discharge Orders None  
  
` Patient Signature:  ____________________________________________________________ Date:  ____________________________________________________________  
  
 Jenelle Cabrera Provider Signature:  ____________________________________________________________ Date:  ____________________________________________________________

## 2018-08-20 NOTE — PROCEDURES
Brief Cardiac Procedure Note    Patient: Markos Winchester MRN: [id number]  SSN: [id number]    YOB: 1950  Age: 75 y.o. Sex: female      Date of Procedure: 8/20/2018     Pre-procedure Diagnosis: Atypical Angina    Post-procedure Diagnosis: Non-cardiac Chest Pain    Reason for Procedure: Cardiomyopathy and Suspected CAD    Procedure: Left Heart Catheterization    Brief Description of Procedure: LHC via R radial artery. Performed By: Hailey Sen MD     Assistants: None    Anesthesia: Moderate Sedation    Estimated Blood Loss: Less than 10 mL      Specimens: None    Implants: None    Findings: Widely patent normal cors, Right dominant, LVEF 55%, LVEDP 14 mmHg    Complications: None    Recommendations: Continue medical therapy.     Signed By: Hailey Sen MD     August 20, 2018
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
## 2018-08-20 NOTE — PROGRESS NOTES
Report received from [address] [place] [address]. Procedural findings communicated. Intra procedural  medication administration reviewed. Progression of care discussed.      Patient received into [address] [facility] 6 post sheath removal.     Access site without bleeding or swelling yes    Dressing dry and intact yes    Patient instructed to limit movement to right upper extremity    Routine post procedural vital signs and site assessment initiated yes

## 2018-08-20 NOTE — PROGRESS NOTES
TRANSFER - OUT REPORT:    Verbal report given to hubert gallo(name) on Bel Castrejon  being transferred to cpru(unit) for routine progression of care       Report consisted of patients Situation, Background, Assessment and   Recommendations(SBAR). Information from the following report(s) SBAR was reviewed with the receiving nurse. Lines:   Peripheral IV 08/20/18 Left Antecubital (Active)   Site Assessment Clean, dry, & intact 8/20/2018  8:42 AM   Phlebitis Assessment 0 8/20/2018  8:42 AM   Infiltration Assessment 0 8/20/2018  8:42 AM   Dressing Status Clean, dry, & intact 8/20/2018  8:42 AM   Dressing Type [place] 8/20/2018  8:42 AM   Hub Color/Line Status Infusing 8/20/2018  8:42 AM       Peripheral IV 08/20/18 Right Hand (Active)   Site Assessment Clean, dry, & intact 8/20/2018  8:42 AM   Phlebitis Assessment 0 8/20/2018  8:42 AM   Infiltration Assessment 0 8/20/2018  8:42 AM   Dressing Status Clean, dry, & intact 8/20/2018  8:42 AM   Dressing Type [place] 8/20/2018  8:42 AM   Hub Color/Line Status Flushed 8/20/2018  8:42 AM        Opportunity for questions and clarification was provided.       Patient transported with:   [facility] DR Eleanor Mckee  No intervention  Right wrist tr band 13ml  No bleeding or hematoma   Versed 2mg  Fentanyl 50mcg

## 2018-11-13 ENCOUNTER — HOSPITAL ENCOUNTER (OUTPATIENT)
Dept: GENERAL RADIOLOGY | Age: 68
Discharge: HOME | End: 2018-11-13
Payer: MEDICARE

## 2018-11-13 DIAGNOSIS — S22.000D CLOSED COMPRESSION FRACTURE OF THORACIC VERTEBRA WITH ROUTINE HEALING, SUBSEQUENT ENCOUNTER: ICD-10-CM

## 2018-11-13 PROCEDURE — 72070 X-RAY EXAM THORAC SPINE 2VWS: CPT

## 2019-08-27 PROBLEM — N63.10 BREAST MASS, RIGHT: Status: ACTIVE | Noted: 2019-08-27

## 2019-08-27 PROBLEM — N63.20 LEFT BREAST MASS: Status: ACTIVE | Noted: 2019-08-27

## 2019-09-03 PROCEDURE — 88173 CYTOPATH EVAL FNA REPORT: CPT

## 2019-09-04 ENCOUNTER — HOSPITAL ENCOUNTER (OUTPATIENT)
Dept: LAB | Age: 69
Discharge: HOME | End: 2019-09-04

## 2019-09-04 DIAGNOSIS — N63.0 BREAST MASS IN FEMALE: ICD-10-CM

## 2019-09-27 ENCOUNTER — HOSPITAL ENCOUNTER (INPATIENT)
Age: 69
LOS: 3 days | Discharge: HOME | DRG: 309 | End: 2019-09-30
Attending: INTERNAL MEDICINE | Admitting: INTERNAL MEDICINE
Payer: MEDICARE

## 2019-09-27 DIAGNOSIS — I50.22 CHRONIC SYSTOLIC HEART FAILURE (HCC): Primary | ICD-10-CM

## 2019-09-27 PROBLEM — I47.1 SVT (SUPRAVENTRICULAR TACHYCARDIA) (HCC): Status: ACTIVE | Noted: 2019-09-27

## 2019-09-27 LAB
ALBUMIN SERPL-MCNC: 3.2 G/DL (ref 3.2–4.6)
ALBUMIN/GLOB SERPL: 1 {RATIO} (ref 1.2–3.5)
ALP SERPL-CCNC: 79 U/L (ref 50–136)
ALT SERPL-CCNC: 18 U/L (ref 12–65)
ANION GAP SERPL CALC-SCNC: 5 MMOL/L (ref 7–16)
AST SERPL-CCNC: 14 U/L (ref 15–37)
ATRIAL RATE: 65 BPM
BILIRUB SERPL-MCNC: 0.3 MG/DL (ref 0.2–1.1)
BUN SERPL-MCNC: 12 MG/DL (ref 8–23)
CALCIUM SERPL-MCNC: 8.3 MG/DL (ref 8.3–10.4)
CALCULATED R AXIS, ECG10: -121 DEGREES
CALCULATED T AXIS, ECG11: 37 DEGREES
CHLORIDE SERPL-SCNC: 109 MMOL/L (ref 98–107)
CO2 SERPL-SCNC: 27 MMOL/L (ref 21–32)
CREAT SERPL-MCNC: 0.68 MG/DL (ref 0.6–1)
DIAGNOSIS, 93000: NORMAL
ERYTHROCYTE [DISTWIDTH] IN BLOOD BY AUTOMATED COUNT: 15.9 % (ref 11.9–14.6)
GLOBULIN SER CALC-MCNC: 3.1 G/DL (ref 2.3–3.5)
GLUCOSE SERPL-MCNC: 94 MG/DL (ref 65–100)
HCT VFR BLD AUTO: 39.2 % (ref 35.8–46.3)
HGB BLD-MCNC: 12.4 G/DL (ref 11.7–15.4)
INR PPP: 1.9
MAGNESIUM SERPL-MCNC: 2.1 MG/DL (ref 1.8–2.4)
MCH RBC QN AUTO: 28.6 PG (ref 26.1–32.9)
MCHC RBC AUTO-ENTMCNC: 31.6 G/DL (ref 31.4–35)
MCV RBC AUTO: 90.5 FL (ref 79.6–97.8)
NRBC # BLD: 0 K/UL (ref 0–0.2)
P-R INTERVAL, ECG05: 182 MS
PLATELET # BLD AUTO: 292 K/UL (ref 150–450)
PMV BLD AUTO: 10.1 FL (ref 9.4–12.3)
POTASSIUM SERPL-SCNC: 4.1 MMOL/L (ref 3.5–5.1)
PROT SERPL-MCNC: 6.3 G/DL (ref 6.3–8.2)
PROTHROMBIN TIME: 22.6 SEC (ref 11.7–14.5)
Q-T INTERVAL, ECG07: 452 MS
QRS DURATION, ECG06: 128 MS
QTC CALCULATION (BEZET), ECG08: 470 MS
RBC # BLD AUTO: 4.33 M/UL (ref 4.05–5.2)
SODIUM SERPL-SCNC: 141 MMOL/L (ref 136–145)
VENTRICULAR RATE, ECG03: 65 BPM
WBC # BLD AUTO: 7.8 K/UL (ref 4.3–11.1)

## 2019-09-27 PROCEDURE — 93005 ELECTROCARDIOGRAM TRACING: CPT | Performed by: NURSE PRACTITIONER

## 2019-09-27 PROCEDURE — 65660000000 HC RM CCU STEPDOWN

## 2019-09-27 PROCEDURE — 36415 COLL VENOUS BLD VENIPUNCTURE: CPT

## 2019-09-27 PROCEDURE — 93005 ELECTROCARDIOGRAM TRACING: CPT | Performed by: INTERNAL MEDICINE

## 2019-09-27 PROCEDURE — 83735 ASSAY OF MAGNESIUM: CPT

## 2019-09-27 PROCEDURE — 85027 COMPLETE CBC AUTOMATED: CPT

## 2019-09-27 PROCEDURE — 85610 PROTHROMBIN TIME: CPT

## 2019-09-27 PROCEDURE — 74011250637 HC RX REV CODE- 250/637: Performed by: INTERNAL MEDICINE

## 2019-09-27 PROCEDURE — 74011250637 HC RX REV CODE- 250/637: Performed by: NURSE PRACTITIONER

## 2019-09-27 PROCEDURE — 80053 COMPREHEN METABOLIC PANEL: CPT

## 2019-09-27 RX ORDER — SODIUM CHLORIDE 0.9 % (FLUSH) 0.9 %
5-40 SYRINGE (ML) INJECTION EVERY 8 HOURS
Status: DISCONTINUED | OUTPATIENT
Start: 2019-09-27 | End: 2019-09-29

## 2019-09-27 RX ORDER — SIMVASTATIN 20 MG/1
10 TABLET, FILM COATED ORAL
Status: DISCONTINUED | OUTPATIENT
Start: 2019-09-27 | End: 2019-09-30 | Stop reason: HOSPADM

## 2019-09-27 RX ORDER — MONTELUKAST SODIUM 10 MG/1
10 TABLET ORAL DAILY
Status: DISCONTINUED | OUTPATIENT
Start: 2019-09-27 | End: 2019-09-27

## 2019-09-27 RX ORDER — CARVEDILOL 25 MG/1
25 TABLET ORAL 2 TIMES DAILY
Status: DISCONTINUED | OUTPATIENT
Start: 2019-09-27 | End: 2019-09-30 | Stop reason: HOSPADM

## 2019-09-27 RX ORDER — DOFETILIDE 0.5 MG/1
500 CAPSULE ORAL 2 TIMES DAILY
Status: DISCONTINUED | OUTPATIENT
Start: 2019-09-27 | End: 2019-09-27

## 2019-09-27 RX ORDER — SODIUM CHLORIDE 0.9 % (FLUSH) 0.9 %
5-40 SYRINGE (ML) INJECTION AS NEEDED
Status: DISCONTINUED | OUTPATIENT
Start: 2019-09-27 | End: 2019-09-30 | Stop reason: HOSPADM

## 2019-09-27 RX ORDER — FLUOXETINE 10 MG/1
10 CAPSULE ORAL DAILY
Status: DISCONTINUED | OUTPATIENT
Start: 2019-09-28 | End: 2019-09-30 | Stop reason: HOSPADM

## 2019-09-27 RX ORDER — LORATADINE 10 MG/1
10 TABLET ORAL DAILY
Status: DISCONTINUED | OUTPATIENT
Start: 2019-09-28 | End: 2019-09-30 | Stop reason: HOSPADM

## 2019-09-27 RX ORDER — MORPHINE SULFATE 2 MG/ML
2 INJECTION, SOLUTION INTRAMUSCULAR; INTRAVENOUS
Status: DISCONTINUED | OUTPATIENT
Start: 2019-09-27 | End: 2019-09-30 | Stop reason: HOSPADM

## 2019-09-27 RX ORDER — WARFARIN SODIUM 5 MG/1
5 TABLET ORAL
Status: DISCONTINUED | OUTPATIENT
Start: 2019-09-27 | End: 2019-09-27

## 2019-09-27 RX ORDER — ACETAMINOPHEN 325 MG/1
650 TABLET ORAL
Status: DISCONTINUED | OUTPATIENT
Start: 2019-09-27 | End: 2019-09-30 | Stop reason: HOSPADM

## 2019-09-27 RX ORDER — TEMAZEPAM 15 MG/1
15 CAPSULE ORAL
Status: DISCONTINUED | OUTPATIENT
Start: 2019-09-27 | End: 2019-09-30 | Stop reason: HOSPADM

## 2019-09-27 RX ORDER — FERROUS SULFATE, DRIED 160(50) MG
1 TABLET, EXTENDED RELEASE ORAL
Status: DISCONTINUED | OUTPATIENT
Start: 2019-09-28 | End: 2019-09-30 | Stop reason: HOSPADM

## 2019-09-27 RX ORDER — LANOLIN ALCOHOL/MO/W.PET/CERES
500 CREAM (GRAM) TOPICAL DAILY
Status: DISCONTINUED | OUTPATIENT
Start: 2019-09-27 | End: 2019-09-27 | Stop reason: CLARIF

## 2019-09-27 RX ORDER — LANOLIN ALCOHOL/MO/W.PET/CERES
500 CREAM (GRAM) TOPICAL DAILY
Status: DISCONTINUED | OUTPATIENT
Start: 2019-09-28 | End: 2019-09-30 | Stop reason: HOSPADM

## 2019-09-27 RX ORDER — LANOLIN ALCOHOL/MO/W.PET/CERES
400 CREAM (GRAM) TOPICAL 2 TIMES DAILY
Status: DISCONTINUED | OUTPATIENT
Start: 2019-09-27 | End: 2019-09-30 | Stop reason: HOSPADM

## 2019-09-27 RX ORDER — PANTOPRAZOLE SODIUM 40 MG/1
40 TABLET, DELAYED RELEASE ORAL
Status: DISCONTINUED | OUTPATIENT
Start: 2019-09-28 | End: 2019-09-30 | Stop reason: HOSPADM

## 2019-09-27 RX ORDER — SODIUM CHLORIDE 0.9 % (FLUSH) 0.9 %
5-40 SYRINGE (ML) INJECTION EVERY 8 HOURS
Status: DISCONTINUED | OUTPATIENT
Start: 2019-09-27 | End: 2019-09-30 | Stop reason: HOSPADM

## 2019-09-27 RX ORDER — NITROGLYCERIN 0.4 MG/1
0.4 TABLET SUBLINGUAL AS NEEDED
Status: DISCONTINUED | OUTPATIENT
Start: 2019-09-27 | End: 2019-09-30 | Stop reason: HOSPADM

## 2019-09-27 RX ORDER — DOFETILIDE 0.25 MG/1
250 CAPSULE ORAL 2 TIMES DAILY
Status: DISCONTINUED | OUTPATIENT
Start: 2019-09-27 | End: 2019-09-30 | Stop reason: HOSPADM

## 2019-09-27 RX ORDER — ESTRADIOL 1 MG/1
2 TABLET ORAL DAILY
Status: DISCONTINUED | OUTPATIENT
Start: 2019-09-28 | End: 2019-09-30 | Stop reason: HOSPADM

## 2019-09-27 RX ORDER — CYANOCOBALAMIN (VITAMIN B-12) 500 MCG
400 TABLET ORAL DAILY
Status: DISCONTINUED | OUTPATIENT
Start: 2019-09-27 | End: 2019-09-27 | Stop reason: CLARIF

## 2019-09-27 RX ORDER — LORATADINE 10 MG/1
10 TABLET ORAL DAILY
Status: DISCONTINUED | OUTPATIENT
Start: 2019-09-27 | End: 2019-09-27

## 2019-09-27 RX ORDER — CYANOCOBALAMIN (VITAMIN B-12) 500 MCG
400 TABLET ORAL DAILY
Status: DISCONTINUED | OUTPATIENT
Start: 2019-09-28 | End: 2019-09-30 | Stop reason: HOSPADM

## 2019-09-27 RX ORDER — MONTELUKAST SODIUM 10 MG/1
10 TABLET ORAL
Status: DISCONTINUED | OUTPATIENT
Start: 2019-09-28 | End: 2019-09-30 | Stop reason: HOSPADM

## 2019-09-27 RX ORDER — SODIUM CHLORIDE 0.9 % (FLUSH) 0.9 %
5-40 SYRINGE (ML) INJECTION AS NEEDED
Status: DISCONTINUED | OUTPATIENT
Start: 2019-09-27 | End: 2019-09-29

## 2019-09-27 RX ORDER — WARFARIN SODIUM 5 MG/1
5 TABLET ORAL
Status: DISCONTINUED | OUTPATIENT
Start: 2019-09-27 | End: 2019-09-30 | Stop reason: HOSPADM

## 2019-09-27 RX ORDER — LORAZEPAM 0.5 MG/1
0.5 TABLET ORAL
Status: DISCONTINUED | OUTPATIENT
Start: 2019-09-27 | End: 2019-09-30 | Stop reason: HOSPADM

## 2019-09-27 RX ADMIN — Medication 5 ML: at 10:00

## 2019-09-27 RX ADMIN — ACETAMINOPHEN 650 MG: 325 TABLET, FILM COATED ORAL at 23:34

## 2019-09-27 RX ADMIN — DOFETILIDE 250 MCG: 0.25 CAPSULE ORAL at 20:07

## 2019-09-27 RX ADMIN — Medication 10 ML: at 22:04

## 2019-09-27 RX ADMIN — MAGNESIUM GLUCONATE 500 MG ORAL TABLET 400 MG: 500 TABLET ORAL at 17:22

## 2019-09-27 RX ADMIN — WARFARIN SODIUM 5 MG: 5 TABLET ORAL at 17:43

## 2019-09-27 RX ADMIN — CARVEDILOL 25 MG: 25 TABLET, FILM COATED ORAL at 17:22

## 2019-09-27 RX ADMIN — SIMVASTATIN 10 MG: 20 TABLET, FILM COATED ORAL at 22:02

## 2019-09-27 RX ADMIN — MONTELUKAST SODIUM 10 MG: 10 TABLET, FILM COATED ORAL at 17:43

## 2019-09-27 RX ADMIN — CARVEDILOL 25 MG: 25 TABLET, FILM COATED ORAL at 12:00

## 2019-09-27 NOTE — PROGRESS NOTES
Warfarin dosing per pharmacist    Nino Leyva is a 70 y.o. female. Indication:  afib    Goal INR:  2-3    Home dose:  2.5 mg M,T; 5 mg all other days    Risk factors or significant drug interactions:  none    Other anticoagulants:  none    Daily Monitoring  Date  INR     Warfarin dose HGB              Notes  9/27  1.9  5 mg  12.4      Pharmacy is consulted to manage warfarin for Ms. Payton Gilmore during this admission. She has been admitted for dofetilide initiation. Her INR on admission is near therapeutic, and I will continue her PTA warfarin dose for now. Daily INR. Pharmacy will continue to follow. Please call with any questions.     Thank you,  Kiki Garza, PharmD  Clinical Pharmacist  [phone]

## 2019-09-27 NOTE — PROGRESS NOTES
Bedside and Verbal report given to self by Yvan Healy RN. Report included SBAR, Kardex, ED Summary, Procedure Summary, Intake and Output and Cardiac Rhythm Vpaced.
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
## 2019-09-27 NOTE — H&P
[facility] CARDIOLOGY History &Physical                 Primary Cardiologist: Dr Jorge Ellington    Primary Care Physician: Gui Andino MD    Admitting Physician: Dr Rogerio Mcnair:     Patient is a 70 y.o. female with a hx of HFrEF s/p BiV ICD, A Fib, NSVT, HTN, anxiety, DM, and HLD. The patient has a hx of A Fib s/p ablation in the past.  She has recently had episodes of SVT, fatigue, SOB, and  Pre syncope. Device interrogation from the office reveal episodes of NSVT. She has had no barbara syncope at this time or ICD shocks. The patient is being admitted today for antiarrythmic initiation that requires hospital initiation. Recent Cardiac Synopsis w/ Labs  (Old records have been reviewed and summarized below)  Cath (8/20/18): 1.  Left ventricle:  Left ventricular ejection fraction is estimated at 55% with normal wall motion. 2.  LVEDP:  14 mmHg. 3.  Left main widely patent. 4.  LAD widely patent. 5.  Circumflex widely patent. 6.  Right coronary artery widely patent and dominant.      Echo: 9/16 Severe LV dysfunction  EKG: Pending      Lab Results   Component Value Date     08/20/2018    K 3.8 08/20/2018    MG 2.0 03/07/2017    BUN 13 08/20/2018    CREA 0.68 08/20/2018    WBC 6.2 08/17/2018    HGB 13.0 08/17/2018     08/17/2018    INR 1.0 08/20/2018    TROIQ <0.02 (L) 07/23/2015        Past Medical History:   Diagnosis Date    Arthritis     Atrial fibrillation     AF despite TIkosyn; AF ablation 9/2013; Recurrent AF-AV tasia ablation 8/2015    Bradycardia     DM (diabetes mellitus) ([facility] 11/3/2015    GERD (gastroesophageal reflux disease)     Heart failure ([facility]     h/o EF <35%, now improved to 55%    History of implantable cardioverter-defibrillator (ICD) placement     [employer] BiV ICD    HLD (hyperlipidemia)     Hypertension     ICD (implantable cardioverter-defibrillator), biventricular, in situ     Dual-chamber ICD in [facility] 2011; RA/RV lead revision/upgrade to BiV 3/2013  NSVT (nonsustained ventricular tachycardia) ([facility]) 5/24/2018    Obesity     BMI 33    Persistent atrial fibrillation ([facility] 5/24/2018    Psychiatric disorder     anxiety    PVD (peripheral vascular disease) ([facility] 11/3/2015    Syncope and collapse     Vertigo       Past Surgical History:   Procedure Laterality Date    HX CHOLECYSTECTOMY      HX HEART CATHETERIZATION  08/20/2018    LV EF=55%. Patent coronary arteries with no significant stenosis    HX HYSTERECTOMY      HX IMPLANTABLE CARDIOVERTER DEFIBRILLATOR      HX PACEMAKER      HX TONSILLECTOMY        Allergies   Allergen Reactions    Darvocet A500 [Propoxyphene N-Acetaminophen] Hives and Nausea and Vomiting    Zithromax [Azithromycin] Hives     Social History     Tobacco Use    Smoking status: Never Smoker    Smokeless tobacco: Never Used   Substance Use Topics    Alcohol use: No      FH:   Family History   Problem Relation Age of Onset    Heart Disease Father         MI at 48yrs    No Known Problems Sister     No Known Problems Brother     No Known Problems Brother         Review of Systems   Constitution: Negative for weight gain and weight loss. HENT: Negative. Eyes: Negative. Cardiovascular: Negative for chest pain (currently pain free), claudication, cyanosis, dyspnea on exertion, irregular heartbeat, leg swelling, near-syncope, orthopnea, palpitations, paroxysmal nocturnal dyspnea and syncope. Respiratory: Negative for cough, shortness of breath and wheezing. Endocrine: Negative. Skin: Negative. Musculoskeletal: Negative. Gastrointestinal: Negative for nausea and vomiting. Genitourinary: Negative. Neurological: Negative for dizziness. Psychiatric/Behavioral: Negative. Allergic/Immunologic: Negative. [unfilled]      Objective:       Visit Vitals  /83 (BP Patient Position: At rest)   Pulse 72   Temp 98.3 °F (36.8 °C)   Resp 16   SpO2 97%       No intake/output data recorded.   No intake/output data recorded. Physical Exam     Physical Exam:  General: Well Developed, Well Nourished, No Acute Distress  HEENT: pupils equal and round, no abnormalities noted  Neck: supple, no JVD, no carotid bruits  Heart: S1S2 with RRR without murmurs or gallops  Lungs: Clear throughout auscultation bilaterally without adventitious sounds  Abd: soft, nontender, nondistended, with good bowel sounds  Ext: warm, no edema, calves supple/nontender, pulses 2+ bilaterally  Skin: warm and dry  Psychiatric: Normal mood and affect  Neurologic: Alert and oriented X 3      ECG: Pending    Data Review:   No results for input(s): NA, K, MG, BUN, CREA, GLU, WBC, HGB, HCT, PLT, INR, CHOL, LDLC, HDL, TNIPOC, TROIQ, HGBEXT, HCTEXT, PLTEXT, INREXT, HGBEXT, HCTEXT, PLTEXT, INREXT in the last 72 hours. No lab exists for component: TGL, HDLC      Echo Results  (Last 48 hours)    None          CXR Results  (Last 48 hours)    None          Assessment/Plan:   Principal Problem:    SVT (supraventricular tachycardia) ([facility] (9/27/2019)  Pt hx of A Fib s/p Ablation on BB at home. Currently admitting for Tykosyn loading after initial EKG, CMP, Mag, and CBC. Will continue daily BMP and Mag. The patient will receive EKG two hours after each medication dosing with monitoring of QTc interval.  Continue Cardiac monitoring and replace Electrolytes PRN. Will have social work consulted to make sure her pharmacy has the medication at discharge.       Active Problems:    PAF (paroxysmal atrial fibrillation) ([facility] (3/25/2013) On BB and Wafarin s/p Ablation, starting Tikosyn Loading today see above      HTN (hypertension) (9/24/2013)      DM (diabetes mellitus) ([facility] (11/3/2015) SSI for coverage      ICD (implantable cardioverter-defibrillator), biventricular, in situ ()      Overview: Dual-chamber ICD in [facility] 2011; RA/RV lead revision/upgrade to BiV 3/2013      Ambar Rasmussen NP  9/27/2019  9:19 AM

## 2019-09-27 NOTE — PROGRESS NOTES
Labs now available and reviewed prior to initial of oral antiarrythmic therapy      Labs show the follow. EKG Results     Procedure 720 Value Units Date/Time    EKG, 12 LEAD, INITIAL [[id number]] Collected:  09/27/19 1308    Order Status:  Completed Updated:  09/27/19 1330     Ventricular Rate 65 BPM      Atrial Rate 65 BPM      P-R Interval 182 ms      QRS Duration 128 ms      Q-T Interval 452 ms      QTC Calculation (Bezet) 470 ms      Calculated R Axis -121 degrees      Calculated T Axis 37 degrees      Diagnosis --     AV dual-paced rhythm  Biventricular pacemaker detected  Abnormal ECG  When compared with ECG of 20-AUG-2018 08:00,  Vent. rate has decreased BY   2 BPM          Lab Results   Component Value Date/Time    Sodium 141 09/27/2019 12:06 PM    Potassium 4.1 09/27/2019 12:06 PM    Chloride 109 (H) 09/27/2019 12:06 PM    CO2 27 09/27/2019 12:06 PM    Anion gap 5 (L) 09/27/2019 12:06 PM    Glucose 94 09/27/2019 12:06 PM    BUN 12 09/27/2019 12:06 PM    Creatinine 0.68 09/27/2019 12:06 PM    GFR est AA >60 09/27/2019 12:06 PM    GFR est non-AA >60 09/27/2019 12:06 PM    Calcium 8.3 09/27/2019 12:06 PM    Bilirubin, total 0.3 09/27/2019 12:06 PM    AST (SGOT) 14 (L) 09/27/2019 12:06 PM    Alk.  phosphatase 79 09/27/2019 12:06 PM    Protein, total 6.3 09/27/2019 12:06 PM    Albumin 3.2 09/27/2019 12:06 PM    Globulin 3.1 09/27/2019 12:06 PM    A-G Ratio 1.0 (L) 09/27/2019 12:06 PM    ALT (SGPT) 18 09/27/2019 12:06 PM     June Hughes NP   09/27/19    1:58 PM

## 2019-09-28 PROBLEM — Z79.899 VISIT FOR MONITORING TIKOSYN THERAPY: Status: ACTIVE | Noted: 2019-09-28

## 2019-09-28 PROBLEM — Z51.81 VISIT FOR MONITORING TIKOSYN THERAPY: Status: ACTIVE | Noted: 2019-09-28

## 2019-09-28 LAB
ANION GAP SERPL CALC-SCNC: 5 MMOL/L (ref 7–16)
ATRIAL RATE: 64 BPM
ATRIAL RATE: 68 BPM
BUN SERPL-MCNC: 12 MG/DL (ref 8–23)
CALCIUM SERPL-MCNC: 7.9 MG/DL (ref 8.3–10.4)
CALCULATED P AXIS, ECG09: 42 DEGREES
CALCULATED P AXIS, ECG09: 65 DEGREES
CALCULATED R AXIS, ECG10: -106 DEGREES
CALCULATED R AXIS, ECG10: -124 DEGREES
CALCULATED T AXIS, ECG11: 25 DEGREES
CALCULATED T AXIS, ECG11: 44 DEGREES
CHLORIDE SERPL-SCNC: 108 MMOL/L (ref 98–107)
CO2 SERPL-SCNC: 28 MMOL/L (ref 21–32)
CREAT SERPL-MCNC: 0.7 MG/DL (ref 0.6–1)
DIAGNOSIS, 93000: NORMAL
DIAGNOSIS, 93000: NORMAL
GLUCOSE SERPL-MCNC: 92 MG/DL (ref 65–100)
INR PPP: 2
MAGNESIUM SERPL-MCNC: 2.1 MG/DL (ref 1.8–2.4)
P-R INTERVAL, ECG05: 174 MS
P-R INTERVAL, ECG05: 176 MS
POTASSIUM SERPL-SCNC: 3.8 MMOL/L (ref 3.5–5.1)
PROTHROMBIN TIME: 23.4 SEC (ref 11.7–14.5)
Q-T INTERVAL, ECG07: 484 MS
Q-T INTERVAL, ECG07: 496 MS
QRS DURATION, ECG06: 130 MS
QRS DURATION, ECG06: 130 MS
QTC CALCULATION (BEZET), ECG08: 499 MS
QTC CALCULATION (BEZET), ECG08: 527 MS
SODIUM SERPL-SCNC: 141 MMOL/L (ref 136–145)
VENTRICULAR RATE, ECG03: 64 BPM
VENTRICULAR RATE, ECG03: 68 BPM

## 2019-09-28 PROCEDURE — 74011250637 HC RX REV CODE- 250/637: Performed by: NURSE PRACTITIONER

## 2019-09-28 PROCEDURE — 93005 ELECTROCARDIOGRAM TRACING: CPT | Performed by: NURSE PRACTITIONER

## 2019-09-28 PROCEDURE — 83735 ASSAY OF MAGNESIUM: CPT

## 2019-09-28 PROCEDURE — 85610 PROTHROMBIN TIME: CPT

## 2019-09-28 PROCEDURE — 80048 BASIC METABOLIC PNL TOTAL CA: CPT

## 2019-09-28 PROCEDURE — 65660000000 HC RM CCU STEPDOWN

## 2019-09-28 PROCEDURE — 74011250637 HC RX REV CODE- 250/637: Performed by: INTERNAL MEDICINE

## 2019-09-28 PROCEDURE — 36415 COLL VENOUS BLD VENIPUNCTURE: CPT

## 2019-09-28 RX ADMIN — LORATADINE 10 MG: 10 TABLET ORAL at 21:43

## 2019-09-28 RX ADMIN — DOFETILIDE 250 MCG: 0.25 CAPSULE ORAL at 08:07

## 2019-09-28 RX ADMIN — CARVEDILOL 25 MG: 25 TABLET, FILM COATED ORAL at 09:37

## 2019-09-28 RX ADMIN — WARFARIN SODIUM 5 MG: 5 TABLET ORAL at 18:35

## 2019-09-28 RX ADMIN — PANTOPRAZOLE SODIUM 40 MG: 40 TABLET, DELAYED RELEASE ORAL at 05:55

## 2019-09-28 RX ADMIN — CYANOCOBALAMIN TAB 1000 MCG 500 MCG: 1000 TAB at 09:37

## 2019-09-28 RX ADMIN — MONTELUKAST 10 MG: 10 TABLET, FILM COATED ORAL at 21:44

## 2019-09-28 RX ADMIN — Medication 10 ML: at 21:47

## 2019-09-28 RX ADMIN — SIMVASTATIN 10 MG: 20 TABLET, FILM COATED ORAL at 21:44

## 2019-09-28 RX ADMIN — ESTRADIOL 2 MG: 1 TABLET ORAL at 09:37

## 2019-09-28 RX ADMIN — DOFETILIDE 250 MCG: 0.25 CAPSULE ORAL at 20:16

## 2019-09-28 RX ADMIN — MAGNESIUM GLUCONATE 500 MG ORAL TABLET 400 MG: 500 TABLET ORAL at 18:35

## 2019-09-28 RX ADMIN — Medication 10 ML: at 05:56

## 2019-09-28 RX ADMIN — OXYBUTYNIN CHLORIDE 15 MG: 10 TABLET, FILM COATED, EXTENDED RELEASE ORAL at 09:38

## 2019-09-28 RX ADMIN — Medication 1 TABLET: at 09:38

## 2019-09-28 RX ADMIN — CARVEDILOL 25 MG: 25 TABLET, FILM COATED ORAL at 18:35

## 2019-09-28 RX ADMIN — Medication 5 ML: at 15:13

## 2019-09-28 RX ADMIN — MAGNESIUM GLUCONATE 500 MG ORAL TABLET 400 MG: 500 TABLET ORAL at 09:38

## 2019-09-28 RX ADMIN — CHOLECALCIFEROL TAB 10 MCG (400 UNIT) 1 TABLET: 10 TAB at 09:38

## 2019-09-28 RX ADMIN — FLUOXETINE 10 MG: 10 CAPSULE ORAL at 09:38

## 2019-09-28 NOTE — PROGRESS NOTES
Visit by spiritual volunteer.     Anna Friends, staff Charlee guzman [address], [address] [facility]  /   [email or web address]

## 2019-09-28 NOTE — PROGRESS NOTES
Warfarin dosing per pharmacist    Arina Latif is a 70 y.o. female. Weight: 76.7 kg (169 lb 3 oz)    Indication:  afib    Goal INR:  2-3    Home dose:  2.5 mg M,T; 5 mg all other days    Risk factors or significant drug interactions:  none    Other anticoagulants:  none    Daily Monitoring  Date  INR     Warfarin dose HGB              Notes  9/27  1.9  5 mg  12.4   9/28  2  5 mg  ---     Pharmacy is consulted to manage warfarin for Ms. Arielle Albright during this admission. She has been admitted for dofetilide initiation. Her INR on admission is near therapeutic, and I will continue her PTA warfarin dose for now. Daily INR. Pharmacy will continue to follow. Please call with any questions.     Thank you,  Deana Jo, PharmD  Clinical Pharmacist  [phone]

## 2019-09-28 NOTE — PROGRESS NOTES
Bedside and Verbal report given to self by Nallely Soriano RN. Report included SBAR, Kardex, ED Summary, Procedure Summary, Intake and Output and Cardiac Rhythm Vpaced.

## 2019-09-28 NOTE — PROGRESS NOTES
[facility] CARDIOLOGY PROGRESS NOTE           9/28/2019 9:30 AM    Admit Date: 9/27/2019      Subjective:   Patient in sinus rhythm. Received second dose of Tikosyn today. QTc 454. BP controlled. No chest pain or dyspnea. ROS:  Cardiovascular:  As noted above    Objective:      Vitals:    09/27/19 2130 09/28/19 0110 09/28/19 0448 09/28/19 0856   BP: 125/74 121/66 112/56 137/74   Pulse: 70 75 72 70   Resp: 15 17 15 16   Temp: 98.8 °F (37.1 °C) 97.9 °F (36.6 °C) 98.2 °F (36.8 °C) 98.3 °F (36.8 °C)   SpO2: 97% 96% 96% 98%   Weight:   76.7 kg (169 lb 3 oz)        Physical Exam:  General-No Acute Distress  Neck- supple, no JVD  CV- regular rate and rhythm no MRG  Lung- clear bilaterally  Abd- soft, nontender, nondistended  Ext- no edema bilaterally. Skin- warm and dry      Data Review:   Recent Labs     09/28/19  0439 09/27/19  1206    141   K 3.8 4.1   MG 2.1 2.1   BUN 12 12   CREA 0.70 0.68   GLU 92 94   WBC  --  7.8   HGB  --  12.4   HCT  --  39.2   PLT  --  292   INR 2.0 1.9      No results found for: EDUARDO Peguero    Assessment/Plan:     Principal Problem:    SVT (supraventricular tachycardia) ([facility] (9/27/2019)    In sinus rhythm. Tikosyn initiated. Received second dose this AM.  Serial EKGs two hours after dose. Monitor on telemetry until 6th dose given. Active Problems:    PAF (paroxysmal atrial fibrillation) ([facility] (3/25/2013)    Tikosyn as above. ON Coumadin. HTN (hypertension) (9/24/2013)    BP controlled. ICD (implantable cardioverter-defibrillator), biventricular, in situ ()    Normal function. Visit for monitoring Tikosyn therapy  See above.              Shawn Durham MD  9/28/2019 9:30 AM

## 2019-09-28 NOTE — PROGRESS NOTES
Problem: Falls - Risk of  Goal: *Absence of Falls  Description  Document Yolette Stade Fall Risk and appropriate interventions in the flowsheet. Outcome: Progressing Towards Goal  Note:   Fall Risk Interventions:     Patient progressing towards goal with no falls on current admission. Patient without confusion, agitation, or sensory perception deficits. Patient has steady gait on ambulation. Personal belongings are within reach. Bed is in the low and locked position with side rails up x2. Yellow gripper socks to bilateral feet. Call light within reach and patient verbalizes understanding of use.           Medication Interventions: Patient to call before getting OOB, Teach patient to arise slowly

## 2019-09-28 NOTE — PROGRESS NOTES
Bedside and Verbal report given to Tiny Flight RN by self. Report included SBAR, Kardex, ED summary, procedure summary, recent results and cardiac rhythm Vpaced.

## 2019-09-29 LAB
ANION GAP SERPL CALC-SCNC: 4 MMOL/L (ref 7–16)
ATRIAL RATE: 60 BPM
ATRIAL RATE: 60 BPM
BUN SERPL-MCNC: 12 MG/DL (ref 8–23)
CALCIUM SERPL-MCNC: 8.7 MG/DL (ref 8.3–10.4)
CALCULATED P AXIS, ECG09: 71 DEGREES
CALCULATED P AXIS, ECG09: 91 DEGREES
CALCULATED R AXIS, ECG10: -101 DEGREES
CALCULATED R AXIS, ECG10: -135 DEGREES
CALCULATED T AXIS, ECG11: 50 DEGREES
CALCULATED T AXIS, ECG11: 54 DEGREES
CHLORIDE SERPL-SCNC: 108 MMOL/L (ref 98–107)
CO2 SERPL-SCNC: 29 MMOL/L (ref 21–32)
CREAT SERPL-MCNC: 0.74 MG/DL (ref 0.6–1)
DIAGNOSIS, 93000: NORMAL
DIAGNOSIS, 93000: NORMAL
GLUCOSE SERPL-MCNC: 92 MG/DL (ref 65–100)
INR PPP: 1.9
MAGNESIUM SERPL-MCNC: 2.1 MG/DL (ref 1.8–2.4)
P-R INTERVAL, ECG05: 170 MS
P-R INTERVAL, ECG05: 186 MS
POTASSIUM SERPL-SCNC: 4.1 MMOL/L (ref 3.5–5.1)
PROTHROMBIN TIME: 22.6 SEC (ref 11.7–14.5)
Q-T INTERVAL, ECG07: 496 MS
Q-T INTERVAL, ECG07: 512 MS
QRS DURATION, ECG06: 128 MS
QRS DURATION, ECG06: 130 MS
QTC CALCULATION (BEZET), ECG08: 496 MS
QTC CALCULATION (BEZET), ECG08: 512 MS
SODIUM SERPL-SCNC: 141 MMOL/L (ref 136–145)
VENTRICULAR RATE, ECG03: 60 BPM
VENTRICULAR RATE, ECG03: 60 BPM

## 2019-09-29 PROCEDURE — 74011250637 HC RX REV CODE- 250/637: Performed by: INTERNAL MEDICINE

## 2019-09-29 PROCEDURE — 93005 ELECTROCARDIOGRAM TRACING: CPT | Performed by: INTERNAL MEDICINE

## 2019-09-29 PROCEDURE — 83735 ASSAY OF MAGNESIUM: CPT

## 2019-09-29 PROCEDURE — 36415 COLL VENOUS BLD VENIPUNCTURE: CPT

## 2019-09-29 PROCEDURE — 80048 BASIC METABOLIC PNL TOTAL CA: CPT

## 2019-09-29 PROCEDURE — 93005 ELECTROCARDIOGRAM TRACING: CPT | Performed by: NURSE PRACTITIONER

## 2019-09-29 PROCEDURE — 74011250637 HC RX REV CODE- 250/637: Performed by: NURSE PRACTITIONER

## 2019-09-29 PROCEDURE — 65660000000 HC RM CCU STEPDOWN

## 2019-09-29 PROCEDURE — 85610 PROTHROMBIN TIME: CPT

## 2019-09-29 RX ORDER — DOFETILIDE 0.25 MG/1
250 CAPSULE ORAL 2 TIMES DAILY
Qty: 60 CAP | Refills: 3 | Status: SHIPPED | OUTPATIENT
Start: 2019-09-29 | End: 2019-09-30 | Stop reason: SDUPTHER

## 2019-09-29 RX ADMIN — Medication 10 ML: at 14:36

## 2019-09-29 RX ADMIN — CARVEDILOL 25 MG: 25 TABLET, FILM COATED ORAL at 17:07

## 2019-09-29 RX ADMIN — MAGNESIUM GLUCONATE 500 MG ORAL TABLET 400 MG: 500 TABLET ORAL at 10:45

## 2019-09-29 RX ADMIN — LORATADINE 10 MG: 10 TABLET ORAL at 22:24

## 2019-09-29 RX ADMIN — MONTELUKAST 10 MG: 10 TABLET, FILM COATED ORAL at 22:25

## 2019-09-29 RX ADMIN — OXYBUTYNIN CHLORIDE 15 MG: 10 TABLET, FILM COATED, EXTENDED RELEASE ORAL at 10:47

## 2019-09-29 RX ADMIN — CARVEDILOL 25 MG: 25 TABLET, FILM COATED ORAL at 10:43

## 2019-09-29 RX ADMIN — DOFETILIDE 250 MCG: 0.25 CAPSULE ORAL at 20:09

## 2019-09-29 RX ADMIN — WARFARIN SODIUM 5 MG: 5 TABLET ORAL at 17:08

## 2019-09-29 RX ADMIN — ESTRADIOL 2 MG: 1 TABLET ORAL at 10:43

## 2019-09-29 RX ADMIN — CHOLECALCIFEROL TAB 10 MCG (400 UNIT) 1 TABLET: 10 TAB at 10:44

## 2019-09-29 RX ADMIN — FLUOXETINE 10 MG: 10 CAPSULE ORAL at 10:46

## 2019-09-29 RX ADMIN — DOFETILIDE 250 MCG: 0.25 CAPSULE ORAL at 08:18

## 2019-09-29 RX ADMIN — Medication 10 ML: at 06:03

## 2019-09-29 RX ADMIN — Medication 1 TABLET: at 08:18

## 2019-09-29 RX ADMIN — OXYBUTYNIN CHLORIDE 10 MG: 10 TABLET, FILM COATED, EXTENDED RELEASE ORAL at 10:46

## 2019-09-29 RX ADMIN — SIMVASTATIN 10 MG: 20 TABLET, FILM COATED ORAL at 22:25

## 2019-09-29 RX ADMIN — CYANOCOBALAMIN TAB 1000 MCG 500 MCG: 1000 TAB at 10:45

## 2019-09-29 RX ADMIN — Medication 10 ML: at 22:27

## 2019-09-29 RX ADMIN — MAGNESIUM GLUCONATE 500 MG ORAL TABLET 400 MG: 500 TABLET ORAL at 17:07

## 2019-09-29 RX ADMIN — PANTOPRAZOLE SODIUM 40 MG: 40 TABLET, DELAYED RELEASE ORAL at 06:02

## 2019-09-29 NOTE — PROGRESS NOTES
[facility] CARDIOLOGY PROGRESS NOTE           9/29/2019 9:30 AM    Admit Date: 9/27/2019      Subjective:   Patient in sinus rhythm. Received fourth dose of Tikosyn today. QTc 460 based on my review. BP controlled. No chest pain or dyspnea. ROS:  Cardiovascular:  As noted above    Objective:      Vitals:    09/28/19 2052 09/29/19 0055 09/29/19 0500 09/29/19 0800   BP: 129/72 122/73 146/85    Pulse: 69 67 73 60   Resp: 16 15 15    Temp: 97.4 °F (36.3 °C) 98.7 °F (37.1 °C) 98.4 °F (36.9 °C)    SpO2: 94% 97% 98%    Weight:   76.7 kg (169 lb 2 oz)        Physical Exam:  General-No Acute Distress  Neck- supple, no JVD  CV- regular rate and rhythm no MRG  Lung- clear bilaterally  Abd- soft, nontender, nondistended  Ext- no edema bilaterally. Skin- warm and dry      Data Review:   Recent Labs     09/29/19  0442 09/28/19  0439 09/27/19  1206    141 141   K 4.1 3.8 4.1   MG 2.1 2.1 2.1   BUN 12 12 12   CREA 0.74 0.70 0.68   GLU 92 92 94   WBC  --   --  7.8   HGB  --   --  12.4   HCT  --   --  39.2   PLT  --   --  292   INR 1.9 2.0 1.9      No results found for: Vallie Cuff, TNIPOC    Assessment/Plan:     Principal Problem:    SV[facility]ra[facility]ricular [facility] ([facility] (9/27/2019)    In sinus rhythm. Tikosyn initiated. Received fourth dose this AM.  Serial EKGs two hours after dose. Monitor on telemetry until 6th dose given. Active Problems:    PAF (paroxysmal atrial fibrillation) ([facility] [address] [address] (3/25/2013)    Tikosyn as above. ON Coumadin. HTN (hypertension) (9/24/2013)    BP controlled. ICD (implantable cardioverter-defibrillator), biventricular, in situ ()    Normal function. Visit for monitoring Tikosyn therapy  See above.              Alejandra Drummond MD  9/29/2019 9:30 AM

## 2019-09-29 NOTE — PROGRESS NOTES
Problem: Falls - Risk of  Goal: *Absence of Falls  Description  Document Rito Feliciano Fall Risk and appropriate interventions in the flowsheet. Outcome: Progressing Towards Goal  Note:   Fall Risk Interventions:        Patient progressing towards goal with no falls on current admission. Patient without confusion, agitation, or sensory perception deficits. Patient has steady gait on ambulation. Personal belongings are within reach. Bed is in the low and locked position with side rails up x2. Yellow gripper socks to bilateral feet. Call light within reach and patient verbalizes understanding of use.        Medication Interventions: Patient to call before getting OOB, Teach patient to arise slowly

## 2019-09-29 NOTE — PROGRESS NOTES
Bedside and Verbal report given to self by Maria Isabel Lay RN. Report included SBAR, Kardex, ED Summary, Procedure Summary, Intake and Output and Cardiac Rhythm Vpaced.

## 2019-09-29 NOTE — PROGRESS NOTES
Pt dropped all her morning meds on floor when she went to put them in her mouth. Will waste and repull and admin her pills at a later time.

## 2019-09-29 NOTE — PROGRESS NOTES
Pt's Tikosyn dose has been reduced. Prescription for Tikosyn 250 mg bid has been faxed [phone] to [facility] [id number] in [place] so it can be ordered for pt to be available Monday afternoon 9/30/19. Also called [phone] to confirm receipt. Pt also updated that it has been sent to [facility] [id number] in [place] as previously discussed.

## 2019-09-29 NOTE — PROGRESS NOTES
Warfarin dosing per pharmacist    Raina Shearer is a 70 y.o. female. Weight: 76.7 kg (169 lb 2 oz)    Indication:  afib    Goal INR:  2-3    Home dose:  2.5 mg M,T; 5 mg all other days    Risk factors or significant drug interactions:  none    Other anticoagulants:  none    Daily Monitoring  Date  INR     Warfarin dose HGB              Notes  9/27  1.9  5 mg  12.4   9/28  2  5 mg  ---   9/29  1.9  5 mg  ---    Pharmacy is consulted to manage warfarin for Ms. Laron Dandy during this admission. She has been admitted for dofetilide initiation. Her INR on admission is near therapeutic, and I will continue her PTA warfarin dose for now. Daily INR. Pharmacy will continue to follow. Please call with any questions.     Thank you,  Jose David Schwartz, PharmD  Clinical Pharmacist  [phone]

## 2019-09-30 VITALS
RESPIRATION RATE: 17 BRPM | WEIGHT: 169.5 LBS | BODY MASS INDEX: 31 KG/M2 | TEMPERATURE: 98 F | DIASTOLIC BLOOD PRESSURE: 85 MMHG | OXYGEN SATURATION: 97 % | SYSTOLIC BLOOD PRESSURE: 135 MMHG | HEART RATE: 72 BPM

## 2019-09-30 LAB
ANION GAP SERPL CALC-SCNC: 5 MMOL/L (ref 7–16)
ATRIAL RATE: 60 BPM
ATRIAL RATE: 61 BPM
BUN SERPL-MCNC: 15 MG/DL (ref 8–23)
CALCIUM SERPL-MCNC: 8.5 MG/DL (ref 8.3–10.4)
CALCULATED P AXIS, ECG09: 38 DEGREES
CALCULATED P AXIS, ECG09: 61 DEGREES
CALCULATED R AXIS, ECG10: -121 DEGREES
CALCULATED R AXIS, ECG10: -124 DEGREES
CALCULATED T AXIS, ECG11: 33 DEGREES
CALCULATED T AXIS, ECG11: 56 DEGREES
CHLORIDE SERPL-SCNC: 109 MMOL/L (ref 98–107)
CO2 SERPL-SCNC: 28 MMOL/L (ref 21–32)
CREAT SERPL-MCNC: 0.67 MG/DL (ref 0.6–1)
DIAGNOSIS, 93000: NORMAL
DIAGNOSIS, 93000: NORMAL
GLUCOSE SERPL-MCNC: 96 MG/DL (ref 65–100)
INR PPP: 2.1
MAGNESIUM SERPL-MCNC: 2.1 MG/DL (ref 1.8–2.4)
P-R INTERVAL, ECG05: 182 MS
P-R INTERVAL, ECG05: 184 MS
POTASSIUM SERPL-SCNC: 4.3 MMOL/L (ref 3.5–5.1)
PROTHROMBIN TIME: 23.9 SEC (ref 11.7–14.5)
Q-T INTERVAL, ECG07: 498 MS
Q-T INTERVAL, ECG07: 512 MS
QRS DURATION, ECG06: 128 MS
QRS DURATION, ECG06: 132 MS
QTC CALCULATION (BEZET), ECG08: 498 MS
QTC CALCULATION (BEZET), ECG08: 515 MS
SODIUM SERPL-SCNC: 142 MMOL/L (ref 136–145)
VENTRICULAR RATE, ECG03: 60 BPM
VENTRICULAR RATE, ECG03: 61 BPM

## 2019-09-30 PROCEDURE — 85610 PROTHROMBIN TIME: CPT

## 2019-09-30 PROCEDURE — 83735 ASSAY OF MAGNESIUM: CPT

## 2019-09-30 PROCEDURE — 93005 ELECTROCARDIOGRAM TRACING: CPT | Performed by: NURSE PRACTITIONER

## 2019-09-30 PROCEDURE — 36415 COLL VENOUS BLD VENIPUNCTURE: CPT

## 2019-09-30 PROCEDURE — 74011250637 HC RX REV CODE- 250/637: Performed by: NURSE PRACTITIONER

## 2019-09-30 PROCEDURE — 80048 BASIC METABOLIC PNL TOTAL CA: CPT

## 2019-09-30 RX ORDER — LISINOPRIL 20 MG/1
20 TABLET ORAL DAILY
Qty: 30 TAB | Refills: 11 | Status: SHIPPED | OUTPATIENT
Start: 2019-10-01 | End: 2019-10-01

## 2019-09-30 RX ORDER — LISINOPRIL 20 MG/1
20 TABLET ORAL DAILY
Status: DISCONTINUED | OUTPATIENT
Start: 2019-09-30 | End: 2019-09-30 | Stop reason: HOSPADM

## 2019-09-30 RX ORDER — DOFETILIDE 0.25 MG/1
250 CAPSULE ORAL 2 TIMES DAILY
Qty: 60 CAP | Refills: 11 | Status: SHIPPED | OUTPATIENT
Start: 2019-09-30 | End: 2019-10-30

## 2019-09-30 RX ADMIN — CHOLECALCIFEROL TAB 10 MCG (400 UNIT) 1 TABLET: 10 TAB at 09:01

## 2019-09-30 RX ADMIN — ESTRADIOL 2 MG: 1 TABLET ORAL at 09:01

## 2019-09-30 RX ADMIN — CYANOCOBALAMIN TAB 1000 MCG 500 MCG: 1000 TAB at 09:01

## 2019-09-30 RX ADMIN — Medication 10 ML: at 06:31

## 2019-09-30 RX ADMIN — LISINOPRIL 20 MG: 20 TABLET ORAL at 09:01

## 2019-09-30 RX ADMIN — MAGNESIUM GLUCONATE 500 MG ORAL TABLET 400 MG: 500 TABLET ORAL at 09:06

## 2019-09-30 RX ADMIN — PANTOPRAZOLE SODIUM 40 MG: 40 TABLET, DELAYED RELEASE ORAL at 06:31

## 2019-09-30 RX ADMIN — FLUOXETINE 10 MG: 10 CAPSULE ORAL at 09:01

## 2019-09-30 RX ADMIN — Medication 1 TABLET: at 09:00

## 2019-09-30 RX ADMIN — DOFETILIDE 250 MCG: 0.25 CAPSULE ORAL at 08:09

## 2019-09-30 RX ADMIN — CARVEDILOL 25 MG: 25 TABLET, FILM COATED ORAL at 09:01

## 2019-09-30 RX ADMIN — OXYBUTYNIN CHLORIDE 15 MG: 10 TABLET, FILM COATED, EXTENDED RELEASE ORAL at 09:00

## 2019-09-30 NOTE — DISCHARGE SUMMARY
[facility] Discharge Summary     Patient ID:  Angelika Ohs  [id number]  21 y.o.  1950    Admit date: 9/27/2019    Discharge date and time:  9/30/19    Admitting Physician: Jessica Whiteside MD     Discharge Physician: Dr. Julia Merino    Admission Diagnoses: SVT (supraventricular tachycardia) [facility]) [I47.1]    Discharge Diagnoses:   Patient Active Problem List    Diagnosis Date Noted    Visit for monitoring Tikosyn therapy 09/28/2019    SVT (supraventricular tachycardia)  09/27/2019    Persistent atrial fibrillation  05/24/2018    NSVT (nonsustained ventricular tachycardia)  05/24/2018    Psychiatric disorder     HLD (hyperlipidemia)     Obesity     ICD (implantable cardioverter-defibrillator), biventricular, in situ     PVD (peripheral vascular disease)  11/03/2015    DM (diabetes mellitus)  11/03/2015    Secondary cardiomyopathy      HTN (hypertension) 09/24/2013    Chronic systolic heart failure 59/39/7830    PAF (paroxysmal atrial fibrillation) 03/25/2013       Cardiology Procedures this admission: Tikosyn loading    Consults: None    Hospital Course: Pt with recently noted episodes of SVT and NSVT was admitted for Tikosyn loading. Pt was monitored on telemetry for 6 doses of sotalol with EKGs showing acceptable Qtc intervals. On the Am of discharge, Pt was up feeling well without any complaints of CP, SOB or palpitations. Pt's labs were stable. Pt was seen and examined by Dr. Julia Merino and determined stable and ready for discharge. Pt was instructed on the importance of taking Tikosyn and Coumadin without missing a dose. Patient's INR on 9/30/19 was 2.1 (followed by her PCP). Reviewed recent echo from office and LV dysfunction noted. She has an BIV ICD and is on coreg. Will add ACE prior to d/c today. Pt will follow up in the office on Dr. Nemo Garrett 10/30/19 at 26 am in [facility]. The patient will need BMP prior to that appt.        DISPOSITION: The patient is being discharged home in stable condition on a low saturated fat, low cholesterol and low salt diet. Pt is instructed to advance activities as tolerated limited to fatigue or shortness of breath. Pt is instructed to call office or return to ER for immediate evaluation of any shortness of breath, palpitations or chest pain. Discharge Exam:   Visit Vitals  /85 (BP 1 Location: Left arm, BP Patient Position: At rest)   Pulse 72   Temp 98 °F (36.7 °C)   Resp 17   Wt 169 lb 8 oz (76.9 kg)   SpO2 97%   BMI 31.00 kg/m²      Pt has been seen by Dr. Caro Lundberg: see his progress note for exam details.     Recent Results (from the past 24 hour(s))   EKG, 12 LEAD, SUBSEQUENT    Collection Time: 09/29/19 10:36 AM   Result Value Ref Range    Ventricular Rate 60 BPM    Atrial Rate 60 BPM    P-R Interval 186 ms    QRS Duration 130 ms    Q-T Interval 496 ms    QTC Calculation (Bezet) 496 ms    Calculated P Axis 91 degrees    Calculated R Axis -101 degrees    Calculated T Axis 50 degrees    Diagnosis       AV dual-paced rhythm  Biventricular pacemaker detected  Abnormal ECG  When compared with ECG of 28-SEP-2019 23:06,  No significant change was found  Confirmed by ST HATTIE PATHAK MD (), SUSY TAVERA ([id number]) on 9/29/2019 4:49:19 PM     EKG, 12 LEAD, SUBSEQUENT    Collection Time: 09/29/19 10:15 PM   Result Value Ref Range    Ventricular Rate 61 BPM    Atrial Rate 61 BPM    P-R Interval 182 ms    QRS Duration 128 ms    Q-T Interval 512 ms    QTC Calculation (Bezet) 515 ms    Calculated P Axis 61 degrees    Calculated R Axis -121 degrees    Calculated T Axis 56 degrees    Diagnosis       Normal sinus rhythm  Non-specific intra-ventricular conduction block  Possible Right ventricular hypertrophy  Possible Lateral infarct , age undetermined  Abnormal ECG  When compared with ECG of 29-SEP-2019 10:36,  Sinus rhythm has replaced Electronic ventricular pacemaker  Confirmed by Erasmo Mooney ([id number]) on 9/30/2019 8:38:66 AM     METABOLIC PANEL, BASIC    Collection Time: 09/30/19  4:11 AM   Result Value Ref Range    Sodium 142 136 - 145 mmol/L    Potassium 4.3 3.5 - 5.1 mmol/L    Chloride 109 (H) 98 - 107 mmol/L    CO2 28 21 - 32 mmol/L    Anion gap 5 (L) 7 - 16 mmol/L    Glucose 96 65 - 100 mg/dL    BUN 15 8 - 23 MG/DL    Creatinine 0.67 0.6 - 1.0 MG/DL    GFR est AA >60 >60 ml/min/1.73m2    GFR est non-AA >60 >60 ml/min/1.73m2    Calcium 8.5 8.3 - 10.4 MG/DL   PROTHROMBIN TIME + INR    Collection Time: 09/30/19  4:11 AM   Result Value Ref Range    Prothrombin time 23.9 (H) 11.7 - 14.5 sec    INR 2.1     MAGNESIUM    Collection Time: 09/30/19  4:11 AM   Result Value Ref Range    Magnesium 2.1 1.8 - 2.4 mg/dL   EKG, 12 LEAD, SUBSEQUENT    Collection Time: 09/30/19 10:11 AM   Result Value Ref Range    Ventricular Rate 60 BPM    Atrial Rate 60 BPM    P-R Interval 184 ms    QRS Duration 132 ms    Q-T Interval 498 ms    QTC Calculation (Bezet) 498 ms    Calculated P Axis 38 degrees    Calculated R Axis -124 degrees    Calculated T Axis 33 degrees    Diagnosis       AV dual-paced rhythm  Biventricular pacemaker detected  Abnormal ECG  When compared with ECG of 29-SEP-2019 22:15,  Electronic ventricular pacemaker has replaced Sinus rhythm           Patient Instructions:     Current Discharge Medication List      START taking these medications    Details   lisinopril (PRINIVIL, ZESTRIL) 20 mg tablet Take 1 Tab by mouth daily. Qty: 30 Tab, Refills: 11      dofetilide (TIKOSYN) 250 mcg capsule Take 1 Cap by mouth two (2) times a day for 30 days. Qty: 60 Cap, Refills: 11         CONTINUE these medications which have NOT CHANGED    Details   omeprazole (PRILOSEC) 40 mg capsule Take 40 mg by mouth daily. nitroglycerin (NITROSTAT) 0.4 mg SL tablet 1 Tab by SubLINGual route every five (5) minutes as needed. Qty: 1 Bottle, Refills: 3      warfarin (COUMADIN) 5 mg tablet Take 5 mg by mouth five (5) days a week.  Wednesday, Thursday, Fri, Sat, Sunday   on Monday and Tues 2.5mg      mirabegron ER (MYRBETRIQ) 50 mg ER tablet Take 50 mg by mouth daily. FLUoxetine (PROZAC) 10 mg tablet Take 10 mg by mouth daily. LORazepam (ATIVAN) 0.5 mg tablet Take  by mouth every eight (8) hours as needed. cholecalciferol, VITAMIN D3, (VITAMIN D3) 5,000 unit tab tablet Take  by mouth daily. loratadine (CLARITIN) 10 mg tablet Take 10 mg by mouth.      magnesium oxide (MAG-OX) 400 mg tablet Take 400 mg by mouth two (2) times a day. Calcium Carbonate-Vit D3-Min (CALTRATE 600+D PLUS MINERALS) 600 mg calcium- 400 unit Tab Take  by mouth daily. montelukast (SINGULAIR) 10 mg tablet Take 10 mg by mouth daily. estradiol (ESTRACE) 1 mg tablet Take 2 mg by mouth daily. lovastatin (MEVACOR) 20 mg tablet Take 40 mg by mouth daily. temazepam (RESTORIL) 15 mg capsule Take 15 mg by mouth nightly as needed. carvedilol (COREG) 25 mg tablet Take 25 mg by mouth two (2) times a day. cyanocobalamin (VITAMIN B-12) 500 mcg tablet Take 500 mcg by mouth.

## 2019-09-30 NOTE — PROGRESS NOTES
Warfarin dosing per pharmacist    Ozzie Mendoza is a 70 y.o. female. Weight: 76.9 kg (169 lb 8 oz)    Indication:  afib    Goal INR:  2-3    Home dose:  2.5 mg M,T; 5 mg all other days    Risk factors or significant drug interactions:  none    Other anticoagulants:  none    Daily Monitoring  Date  INR     Warfarin dose HGB              Notes  9/27  1.9  5 mg  12.4   9/28  2  5 mg  ---   9/29  1.9  5 mg  ---  9/30  2.1  2.5 mg  ---    Pharmacy is consulted to manage warfarin for Ms. Will Stroud during this admission. She has been admitted for dofetilide initiation. Her INR on admission was near therapeutic, and her PTA warfarin dose has been continued. INR 2.1 today. Daily INR. Pharmacy will continue to follow. Please call with any questions.     Thank you,  Soledad Solano, PharmD  Clinical Pharmacist  [phone]

## 2019-09-30 NOTE — PROGRESS NOTES
Bedside and Verbal report given to Solo Montgomery RN by self. Report included SBAR, Kardex, ED summary, procedure summary, recent results and cardiac rhythm Vpaced.

## 2019-09-30 NOTE — PROGRESS NOTES
Problem: Falls - Risk of  Goal: *Absence of Falls  Description  Document Radha Cueto Fall Risk and appropriate interventions in the flowsheet.   Outcome: Progressing Towards Goal  Note:   Fall Risk Interventions:            Medication Interventions: Patient to call before getting OOB, Teach patient to arise slowly

## 2019-09-30 NOTE — DISCHARGE INSTRUCTIONS
Patient Education      DISPOSITION: The patient is being discharged home in stable condition on a low saturated fat, low cholesterol and low salt diet. Pt is instructed to advance activities as tolerated limited to fatigue or shortness of breath. Pt is instructed to call office or return to ER for immediate evaluation of any shortness of breath, palpitations or chest pain. Learning About Rhythm-Control Medicines  Introduction    Rhythm-control medicines return your heart to a normal rhythm. And they keep it at a normal rhythm. They are also called antiarrhythmics. Doctors may use these medicines if:  · You have symptoms from a heart rhythm problem. · You had electric cardioversion. Or you will have it. · You had catheter ablation. · You tried medicine to control your heart rate. But it did not help. · You are at risk of having a dangerous heart rhythm. These medicines can have serious side effects. Examples  · Amiodarone (Pacerone)  · Dofetilide (Tikosyn)  · Propafenone (Rythmol)  · Sotalol (Betapace AF)  Possible side effects  Side effects depend on which medicine you take. One serious one is a very irregular heart rate. Read the information that comes with your medicine. What to know about taking this medicine  · You may be in the hospital when you start this medicine. Your doctor will watch what it does to your heart. · Be sure to ask your doctor any questions. You may want to know more about the pros and cons of the medicine. · Your doctor will check you often. He or she will make sure you are not having problems. Be sure to go to all of your visits. · You may need regular blood tests. These make sure you are taking the right amount of medicine. · Take your medicines exactly as prescribed. Call your doctor if you think you are having a problem with your medicine. · Check with your doctor or pharmacist before you use any other medicines. This includes over-the-counter medicines.  Make sure your doctor knows all of the medicines, vitamins, herbal products, and supplements you take. Taking some medicines together can cause problems. Where can you learn more? Go to http://luke-shalonda.info/. Enter J360 in the search box to learn more about \"Learning About Rhythm-Control Medicines. \"  Current as of: April 9, 2019  Content Version: 12.2  © 7828-8273 [employer]. Care instructions adapted under license by [employer] (which disclaims liability or warranty for this information). If you have questions about a medical condition or this instruction, always ask your healthcare professional. [place] [address] any warranty or liability for your use of this information. Patient Education   Dofetilide (By mouth)   Dofetilide (fzn-ALF-k-lide)  Treats an uneven heartbeat. Brand Name(s): Tikosyn   There may be other brand names for this medicine. When This Medicine Should Not Be Used: This medicine is not right for everyone. Do not use it if you had an allergic reaction to dofetilide, if you have long QT syndrome or severe kidney disease, or if you are on a dialysis. How to Use This Medicine:   Capsule  · Your doctor will start you on this medicine while you are in the hospital so your heart rhythm can be monitored. You will need to stay in the hospital for at least 3 days so the doctor can check your progress. · Once you are released from the hospital, take your medicine as directed. · This medicine should come with a Medication Guide. Ask your pharmacist for a copy if you do not have one. · Missed dose: If you miss a dose of this medicine, skip the missed dose and go back to your regular dosing schedule. Do not double doses. · Store the medicine in a closed container at room temperature, away from heat, moisture, and direct light.   Drugs and Foods to Avoid:   Ask your doctor or pharmacist before using any other medicine, including over-the-counter medicines, vitamins, and herbal products. · Do not use this medicine together with cimetidine, dolutegravir, hydrochlorothiazide, hydrochlorothiazide/triamterene, ketoconazole, megestrol, prochlorperazine, trimethoprim, trimethoprim/sulfamethoxazole, or verapamil. · Some foods and medicines can affect how dofetilide works. Tell your doctor if you are using diltiazem, metformin, quinine, or zafirlukast.  · Tell your doctor if you are also using certain diuretics (such as amiloride, spironolactone, triamterene), blood pressure medicines, medicine for depression, a phenothiazine medicine, medicine for infections, or medicine for HIV/AIDS. · Do not eat grapefruit or drink grapefruit juice while you are using this medicine. Warnings While Using This Medicine:   · Tell your doctor if you are pregnant or breastfeeding, or if you have kidney disease, liver disease, heart disease, other heart rhythm problems, or mineral imbalance (such as low levels of magnesium or potassium in the blood). · This medicine may cause the following problems:  ¨ Ventricular arrhythmia  ¨ QT prolongation  · Your doctor will check your progress and the effects of this medicine at regular visits. Keep all appointments. · Keep all medicine out of the reach of children. Never share your medicine with anyone. Possible Side Effects While Using This Medicine:   Call your doctor right away if you notice any of these side effects:  · Allergic reaction: Itching or hives, swelling in your face or hands, swelling or tingling in your mouth or throat, chest tightness, trouble breathing  · Chest pain or trouble breathing  · Fainting, dizziness, or lightheadedness  · Fast, slow, or pounding heartbeat  · Severe diarrhea, vomiting, loss of appetite, increased thirst, sweating, muscle cramps  If you notice other side effects that you think are caused by this medicine, tell your doctor.    Call your doctor for medical advice about side effects. You may report side effects to FDA at 5-824-FDA-5253  © 2017 [facility] Information is for End User's use only and may not be sold, redistributed or otherwise used for commercial purposes. The above information is an  only. It is not intended as medical advice for individual conditions or treatments. Talk to your doctor, nurse or pharmacist before following any medical regimen to see if it is safe and effective for you. Patient Education   Lisinopril (By mouth)   Lisinopril (lye-SIN-oh-pril)  Treats high blood pressure and heart failure. Also given to reduce the risk of death after a heart attack. This medicine is an ACE inhibitor. Brand Name(s): Prinivil, Qbrelis, Zestril   There may be other brand names for this medicine. When This Medicine Should Not Be Used: This medicine is not right for everyone. Do not use it if you had an allergic reaction to lisinopril or another ACE inhibitor, or if you are pregnant. How to Use This Medicine:   Liquid, Tablet  · Take your medicine as directed. Your dose may need to be changed several times to find what works best for you. · Oral liquid: Measure the oral liquid medicine with a marked measuring spoon, oral syringe, or medicine cup. · Missed dose: Take a dose as soon as you remember. If it is almost time for your next dose, wait until then and take a regular dose. Do not take extra medicine to make up for a missed dose. · Store the medicine in a closed container at room temperature, away from heat, moisture, and direct light. Drugs and Foods to Avoid:   Ask your doctor or pharmacist before using any other medicine, including over-the-counter medicines, vitamins, and herbal products. · Do not use this medicine together with aliskiren if you have diabetes. · Some foods and medicines may affect how lisinopril works.  Tell your doctor if you are using any of the following:   ¨ Aliskiren, everolimus, lithium, sirolimus, temsirolimus  ¨ Another blood pressure medicine, including an angiotensin receptor blocker (ARB)  ¨ Diuretic (water pill, including amiloride, spironolactone, triamterene)  ¨ Insulin or diabetes medicine  ¨ NSAID pain or arthritis medicine (including aspirin, celecoxib, diclofenac, ibuprofen, naproxen)  · Ask your doctor before you use any medicine, supplement, or salt substitute that contains potassium. Warnings While Using This Medicine:   · It is not safe to take this medicine during pregnancy. It could harm an unborn baby. Tell your doctor right away if you become pregnant. · Tell your doctor if you are breastfeeding, or if you have kidney disease, liver disease, diabetes, or heart or blood vessel disease. · This medicine may cause the following problems:  ¨ Angioedema (severe swelling)  ¨ Kidney problems  ¨ Serious liver problems  · This medicine could lower your blood pressure too much, especially when you first use it or if you are dehydrated. Stand or sit up slowly if you feel lightheaded or dizzy. · Do not stop using this medicine without asking your doctor, even if you feel well. This medicine will not cure your high blood pressure, but it will help keep it in a normal range. You may have to take blood pressure medicine for the rest of your life. · Tell any doctor or dentist who treats you that you are using this medicine. · Your doctor will do lab tests at regular visits to check on the effects of this medicine. Keep all appointments. · Keep all medicine out of the reach of children. Never share your medicine with anyone.   Possible Side Effects While Using This Medicine:   Call your doctor right away if you notice any of these side effects:  · Allergic reaction: Itching or hives, swelling in your face or hands, swelling or tingling in your mouth or throat, chest tightness, trouble breathing  · Blistering, peeling, or red skin rash  · Change in how much or how often you urinate  · Confusion, weakness, uneven heartbeat, trouble breathing, numbness or tingling in your hands, feet, or lips  · Dark urine or pale stools, nausea, vomiting, loss of appetite, stomach pain, yellow skin or eyes  · Fever, chills, sore throat, body aches  · Lightheadedness, dizziness, fainting  · Severe stomach pain (with or without nausea or vomiting)  If you notice these less serious side effects, talk with your doctor:   · Dry cough  If you notice other side effects that you think are caused by this medicine, tell your doctor. Call your doctor for medical advice about side effects. You may report side effects to FDA at 9-965-BOS-3020  © 2017 [facility] Information is for End User's use only and may not be sold, redistributed or otherwise used for commercial purposes. The above information is an  only. It is not intended as medical advice for individual conditions or treatments. Talk to your doctor, nurse or pharmacist before following any medical regimen to see if it is safe and effective for you. DISCHARGE SUMMARY from Nurse    PATIENT INSTRUCTIONS:    What to do at Home:  Recommended activity: Activity as tolerated    If you experience any of the following symptoms Shortness of Breath or Chest pain not relieved by nitroglycerin, please go to Emergency Department. *  Please give a list of your current medications to your Primary Care Provider. *  Please update this list whenever your medications are discontinued, doses are      changed, or new medications (including over-the-counter products) are added. *  Please carry medication information at all times in case of emergency situations. These are general instructions for a healthy lifestyle:    No smoking/ No tobacco products/ Avoid exposure to second hand smoke  Surgeon General's Warning:  Quitting smoking now greatly reduces serious risk to your health.     Obesity, smoking, and sedentary lifestyle greatly increases your risk for illness    A healthy diet, regular physical exercise & weight monitoring are important for maintaining a healthy lifestyle    You may be retaining fluid if you have a history of heart failure or if you experience any of the following symptoms:  Weight gain of 3 pounds or more overnight or 5 pounds in a week, increased swelling in our hands or feet or shortness of breath while lying flat in bed. Please call your doctor as soon as you notice any of these symptoms; do not wait until your next office visit. The discharge information has been reviewed with the patient. The patient verbalized understanding. Discharge medications reviewed with the patient and appropriate educational materials and side effects teaching were provided.   ___________________________________________________________________________________________________________________________________

## 2019-09-30 NOTE — PROGRESS NOTES
[facility] CARDIOLOGY PROGRESS NOTE           9/30/2019 8:09am    Admit Date: 9/27/2019      Subjective:   Patient in sinus rhythm. QTc 450 on review. Doing well, wants to be home. ROS:  Cardiovascular:  As noted above    Objective:      Vitals:    09/29/19 2009 09/29/19 2033 09/30/19 0017 09/30/19 0433   BP: 134/80 129/69 142/86 127/73   Pulse: 71 73 66 67   Resp:  17 19 18   Temp:  98.2 °F (36.8 °C) 98 °F (36.7 °C) 98.5 °F (36.9 °C)   SpO2:  97% 100% 95%   Weight:    76.9 kg (169 lb 8 oz)       Physical Exam:  General-No Acute Distress  Neck- supple, no JVD  CV- regular rate and rhythm no MRG  Lung- clear bilaterally with dec BS in the bases  Abd- soft, nontender, nondistended  Ext- no edema bilaterally. Skin- warm and dry      Data Review:   Recent Labs     09/30/19  0411 09/29/19  0442  09/27/19  1206    141   < > 141   K 4.3 4.1   < > 4.1   MG 2.1 2.1   < > 2.1   BUN 15 12   < > 12   CREA 0.67 0.74   < > 0.68   GLU 96 92   < > 94   WBC  --   --   --  7.8   HGB  --   --   --  12.4   HCT  --   --   --  39.2   PLT  --   --   --  292   INR 2.1 1.9   < > 1.9    < > = values in this interval not displayed. No results found for: EDUARDO Groves    Assessment/Plan:     Principal Problem:    SVT (supraventricular tachycardia) ([facility] [address] (9/27/2019)    In sinus rhythm. QTc less than stated as reviewed with my partners, QTc ~450. Follow for 6th dose. Active Problems:    PAF (paroxysmal atrial fibrillation) ([facility] [address] (3/25/2013)    Tikosyn as above. On coumadin. Follow labs. HTN (hypertension) (9/24/2013)    BP controlled. ICD (implantable cardioverter-defibrillator), biventricular, in situ ()    Normal function. Home after 6th dose and EKG review. Drop in EF noted, GDMT for now. Follow up with Dr. Brandee Garrett.               Yvan Lockhart DO  9/30/2019 8:09am

## 2019-09-30 NOTE — PROGRESS NOTES
Care Management Interventions  PCP Verified by CM: Yes  Palliative Care Criteria Met (RRAT>21 & CHF Dx)?: No(RRAT 13 Dx Atrial fib)  Transition of Care Consult (CM Consult): Discharge Planning  Discharge Durable Medical Equipment: No(none)  Physical Therapy Consult: No  Occupational Therapy Consult: No  Speech Therapy Consult: No  Current Support Network: Lives with Spouse  Confirm Follow Up Transport: Self  Plan discussed with Pt/Family/Caregiver: Yes  Freedom of Choice Offered: Yes  Discharge Location  Discharge Placement: [address] home, Tikosyn 250 [facility] at [address] [facility], no further CM needs.

## 2019-09-30 NOTE — PROGRESS NOTES
Discharge instructions reviewed with patient. Prescriptions given for tikosyn and lisinopril and med info sheets provided for all new medications. Opportunity for questions provided. patient voiced understanding of all discharge instructions.      IV and monitor removed by primary RN

## 2019-10-10 ENCOUNTER — HOSPITAL ENCOUNTER (OUTPATIENT)
Dept: LAB | Age: 69
Discharge: HOME | End: 2019-10-10

## 2019-10-10 PROCEDURE — 88342 IMHCHEM/IMCYTCHM 1ST ANTB: CPT

## 2019-10-10 PROCEDURE — 88305 TISSUE EXAM BY PATHOLOGIST: CPT

## 2021-07-10 NOTE — PROGRESS NOTES
Bedside and Verbal shift change report given to Cleve Barrera RN (oncoming nurse) by Laura Bloch, RN (offgoing nurse). Report included the following information SBAR, Kardex, Intake/Output, MAR, Recent Results and Cardiac Rhythm paced. Patient resting in bed quietly. Yes

## 2021-08-23 ENCOUNTER — APPOINTMENT (OUTPATIENT)
Dept: GENERAL RADIOLOGY | Age: 71
End: 2021-08-23
Attending: EMERGENCY MEDICINE
Payer: MEDICARE

## 2021-08-23 ENCOUNTER — HOSPITAL ENCOUNTER (EMERGENCY)
Age: 71
Discharge: HOME | End: 2021-08-23
Attending: EMERGENCY MEDICINE
Payer: MEDICARE

## 2021-08-23 ENCOUNTER — APPOINTMENT (OUTPATIENT)
Dept: CT IMAGING | Age: 71
End: 2021-08-23
Attending: EMERGENCY MEDICINE
Payer: MEDICARE

## 2021-08-23 VITALS
SYSTOLIC BLOOD PRESSURE: 138 MMHG | TEMPERATURE: 97.7 F | OXYGEN SATURATION: 94 % | HEART RATE: 73 BPM | DIASTOLIC BLOOD PRESSURE: 80 MMHG | RESPIRATION RATE: 16 BRPM

## 2021-08-23 DIAGNOSIS — R11.2 NON-INTRACTABLE VOMITING WITH NAUSEA, UNSPECIFIED VOMITING TYPE: Primary | ICD-10-CM

## 2021-08-23 DIAGNOSIS — E86.0 DEHYDRATION: ICD-10-CM

## 2021-08-23 DIAGNOSIS — D69.6 THROMBOCYTOPENIA (HCC): ICD-10-CM

## 2021-08-23 LAB
ALBUMIN SERPL-MCNC: 3 G/DL (ref 3.2–4.6)
ALBUMIN/GLOB SERPL: 0.9 {RATIO} (ref 1.2–3.5)
ALP SERPL-CCNC: 116 U/L (ref 50–136)
ALT SERPL-CCNC: 29 U/L (ref 12–65)
ANION GAP SERPL CALC-SCNC: 7 MMOL/L (ref 7–16)
AST SERPL-CCNC: 32 U/L (ref 15–37)
ATRIAL RATE: 73 BPM
B PERT DNA SPEC QL NAA+PROBE: NOT DETECTED
BACTERIA URNS QL MICRO: ABNORMAL /HPF
BASOPHILS # BLD: 0 K/UL (ref 0–0.2)
BASOPHILS NFR BLD: 0 % (ref 0–2)
BILIRUB SERPL-MCNC: 0.4 MG/DL (ref 0.2–1.1)
BNP SERPL-MCNC: 1336 PG/ML (ref 5–125)
BORDETELLA PARAPERTUSSIS PCR, BORPAR: NOT DETECTED
BUN SERPL-MCNC: 10 MG/DL (ref 8–23)
C PNEUM DNA SPEC QL NAA+PROBE: NOT DETECTED
CALCIUM SERPL-MCNC: 8.9 MG/DL (ref 8.3–10.4)
CALCULATED P AXIS, ECG09: 58 DEGREES
CALCULATED R AXIS, ECG10: -137 DEGREES
CALCULATED T AXIS, ECG11: 37 DEGREES
CASTS URNS QL MICRO: ABNORMAL /LPF
CHLORIDE SERPL-SCNC: 104 MMOL/L (ref 98–107)
CO2 SERPL-SCNC: 25 MMOL/L (ref 21–32)
CREAT SERPL-MCNC: 0.78 MG/DL (ref 0.6–1)
CRYSTALS URNS QL MICRO: 0 /LPF
DIAGNOSIS, 93000: NORMAL
DIFFERENTIAL METHOD BLD: ABNORMAL
EOSINOPHIL # BLD: 0 K/UL (ref 0–0.8)
EOSINOPHIL NFR BLD: 0 % (ref 0.5–7.8)
EPI CELLS #/AREA URNS HPF: ABNORMAL /HPF
ERYTHROCYTE [DISTWIDTH] IN BLOOD BY AUTOMATED COUNT: 15 % (ref 11.9–14.6)
FLUAV SUBTYP SPEC NAA+PROBE: NOT DETECTED
FLUBV RNA SPEC QL NAA+PROBE: NOT DETECTED
GLOBULIN SER CALC-MCNC: 3.3 G/DL (ref 2.3–3.5)
GLUCOSE SERPL-MCNC: 114 MG/DL (ref 65–100)
HADV DNA SPEC QL NAA+PROBE: NOT DETECTED
HCOV 229E RNA SPEC QL NAA+PROBE: NOT DETECTED
HCOV HKU1 RNA SPEC QL NAA+PROBE: NOT DETECTED
HCOV NL63 RNA SPEC QL NAA+PROBE: NOT DETECTED
HCOV OC43 RNA SPEC QL NAA+PROBE: NOT DETECTED
HCT VFR BLD AUTO: 33.7 % (ref 35.8–46.3)
HGB BLD-MCNC: 10.8 G/DL (ref 11.7–15.4)
HMPV RNA SPEC QL NAA+PROBE: NOT DETECTED
HPIV1 RNA SPEC QL NAA+PROBE: NOT DETECTED
HPIV2 RNA SPEC QL NAA+PROBE: NOT DETECTED
HPIV3 RNA SPEC QL NAA+PROBE: NOT DETECTED
HPIV4 RNA SPEC QL NAA+PROBE: NOT DETECTED
IMM GRANULOCYTES # BLD AUTO: 0 K/UL (ref 0–0.5)
IMM GRANULOCYTES NFR BLD AUTO: 1 % (ref 0–5)
INR BLD: 2.2 (ref 0.9–1.2)
LACTATE SERPL-SCNC: 1.1 MMOL/L (ref 0.4–2)
LYMPHOCYTES # BLD: 0.4 K/UL (ref 0.5–4.6)
LYMPHOCYTES NFR BLD: 6 % (ref 13–44)
M PNEUMO DNA SPEC QL NAA+PROBE: NOT DETECTED
MAGNESIUM SERPL-MCNC: 2 MG/DL (ref 1.8–2.4)
MCH RBC QN AUTO: 27.3 PG (ref 26.1–32.9)
MCHC RBC AUTO-ENTMCNC: 32 G/DL (ref 31.4–35)
MCV RBC AUTO: 85.3 FL (ref 79.6–97.8)
MONOCYTES # BLD: 0.5 K/UL (ref 0.1–1.3)
MONOCYTES NFR BLD: 7 % (ref 4–12)
MUCOUS THREADS URNS QL MICRO: ABNORMAL /LPF
NEUTS SEG # BLD: 5.7 K/UL (ref 1.7–8.2)
NEUTS SEG NFR BLD: 86 % (ref 43–78)
NRBC # BLD: 0 K/UL (ref 0–0.2)
OTHER OBSERVATIONS,UCOM: ABNORMAL
PLATELET # BLD AUTO: 78 K/UL (ref 150–450)
PMV BLD AUTO: 12.2 FL (ref 9.4–12.3)
POTASSIUM SERPL-SCNC: 3.8 MMOL/L (ref 3.5–5.1)
PROCALCITONIN SERPL-MCNC: <0.05 NG/ML
PROT SERPL-MCNC: 6.3 G/DL (ref 6.3–8.2)
PT BLD: 24.9 SECS (ref 9.6–11.6)
Q-T INTERVAL, ECG07: 476 MS
QRS DURATION, ECG06: 122 MS
QTC CALCULATION (BEZET), ECG08: 524 MS
RBC # BLD AUTO: 3.95 M/UL (ref 4.05–5.2)
RBC #/AREA URNS HPF: ABNORMAL /HPF
RSV RNA SPEC QL NAA+PROBE: NOT DETECTED
RV+EV RNA SPEC QL NAA+PROBE: NOT DETECTED
SARS-COV-2 PCR, COVPCR: NOT DETECTED
SODIUM SERPL-SCNC: 136 MMOL/L (ref 136–145)
TROPONIN-HIGH SENSITIVITY: 7.3 PG/ML (ref 0–14)
TROPONIN-HIGH SENSITIVITY: 8.2 PG/ML (ref 0–14)
VENTRICULAR RATE, ECG03: 73 BPM
WBC # BLD AUTO: 6.7 K/UL (ref 4.3–11.1)
WBC URNS QL MICRO: ABNORMAL /HPF

## 2021-08-23 PROCEDURE — 74011250636 HC RX REV CODE- 250/636: Performed by: EMERGENCY MEDICINE

## 2021-08-23 PROCEDURE — 85610 PROTHROMBIN TIME: CPT

## 2021-08-23 PROCEDURE — 84484 ASSAY OF TROPONIN QUANT: CPT

## 2021-08-23 PROCEDURE — 83605 ASSAY OF LACTIC ACID: CPT

## 2021-08-23 PROCEDURE — 74011000636 HC RX REV CODE- 636: Performed by: EMERGENCY MEDICINE

## 2021-08-23 PROCEDURE — 83880 ASSAY OF NATRIURETIC PEPTIDE: CPT

## 2021-08-23 PROCEDURE — 99285 EMERGENCY DEPT VISIT HI MDM: CPT

## 2021-08-23 PROCEDURE — 36415 COLL VENOUS BLD VENIPUNCTURE: CPT

## 2021-08-23 PROCEDURE — 0202U NFCT DS 22 TRGT SARS-COV-2: CPT

## 2021-08-23 PROCEDURE — 96361 HYDRATE IV INFUSION ADD-ON: CPT

## 2021-08-23 PROCEDURE — 80053 COMPREHEN METABOLIC PANEL: CPT

## 2021-08-23 PROCEDURE — 74177 CT ABD & PELVIS W/CONTRAST: CPT

## 2021-08-23 PROCEDURE — 83735 ASSAY OF MAGNESIUM: CPT

## 2021-08-23 PROCEDURE — 93005 ELECTROCARDIOGRAM TRACING: CPT | Performed by: EMERGENCY MEDICINE

## 2021-08-23 PROCEDURE — 85025 COMPLETE CBC W/AUTO DIFF WBC: CPT

## 2021-08-23 PROCEDURE — 81015 MICROSCOPIC EXAM OF URINE: CPT

## 2021-08-23 PROCEDURE — 71045 X-RAY EXAM CHEST 1 VIEW: CPT

## 2021-08-23 PROCEDURE — APPNB15 APP NON BILLABLE TIME 0-15 MINS: Performed by: NURSE PRACTITIONER

## 2021-08-23 PROCEDURE — 84145 PROCALCITONIN (PCT): CPT

## 2021-08-23 PROCEDURE — 96374 THER/PROPH/DIAG INJ IV PUSH: CPT

## 2021-08-23 RX ORDER — SODIUM CHLORIDE 0.9 % (FLUSH) 0.9 %
5-10 SYRINGE (ML) INJECTION AS NEEDED
Status: DISCONTINUED | OUTPATIENT
Start: 2021-08-23 | End: 2021-08-23 | Stop reason: HOSPADM

## 2021-08-23 RX ORDER — SODIUM CHLORIDE 0.9 % (FLUSH) 0.9 %
10 SYRINGE (ML) INJECTION
Status: COMPLETED | OUTPATIENT
Start: 2021-08-23 | End: 2021-08-23

## 2021-08-23 RX ORDER — ONDANSETRON 4 MG/1
4 TABLET, ORALLY DISINTEGRATING ORAL
Qty: 20 TABLET | Refills: 0 | Status: SHIPPED | OUTPATIENT
Start: 2021-08-23

## 2021-08-23 RX ORDER — SODIUM CHLORIDE 0.9 % (FLUSH) 0.9 %
5-10 SYRINGE (ML) INJECTION EVERY 8 HOURS
Status: DISCONTINUED | OUTPATIENT
Start: 2021-08-23 | End: 2021-08-23 | Stop reason: HOSPADM

## 2021-08-23 RX ORDER — ONDANSETRON 2 MG/ML
4 INJECTION INTRAMUSCULAR; INTRAVENOUS
Status: COMPLETED | OUTPATIENT
Start: 2021-08-23 | End: 2021-08-23

## 2021-08-23 RX ADMIN — IOPAMIDOL 100 ML: 755 INJECTION, SOLUTION INTRAVENOUS at 13:05

## 2021-08-23 RX ADMIN — Medication 5 ML: at 10:54

## 2021-08-23 RX ADMIN — ONDANSETRON 4 MG: 2 INJECTION INTRAMUSCULAR; INTRAVENOUS at 11:15

## 2021-08-23 RX ADMIN — Medication 10 ML: at 13:05

## 2021-08-23 RX ADMIN — SODIUM CHLORIDE 1000 ML: 900 INJECTION, SOLUTION INTRAVENOUS at 10:54

## 2021-08-23 NOTE — ED NOTES
I have reviewed discharge instructions with the patient. The patient verbalized understanding. Patient left ED via Discharge Method: ambulatory to Home. Opportunity for questions and clarification provided. Patient given 1 scripts. To continue your aftercare when you leave the hospital, you may receive an automated call from our care team to check in on how you are doing. This is a free service and part of our promise to provide the best care and service to meet your aftercare needs.  If you have questions, or wish to unsubscribe from this service please call [phone]. Thank you for Choosing our [facility] Emergency Department.

## 2021-08-23 NOTE — PROGRESS NOTES
Received [employer] message to arrange urgent f/u with Dr. Arletha Pallas for thrombocytopenia. Request sent to scheduling who will call patient with appt information.       Margarett Schilder, NP  [facility] Hematology & Oncology

## 2021-08-23 NOTE — ED TRIAGE NOTES
Patient arrives to ED via EMS from home. Patient complains of fatigue and generalized weakness x 1-2 weeks. Patient complains of nausea every time she eats or drinks anything. Patient reports diarrhea this morning. Patient was vaccinated for covid. Patient reports she was tested for covid last week and it was negative.

## 2021-08-27 NOTE — ED PROVIDER NOTES
Patient presents with complaint of nausea and inability to eat secondary to nausea. She has had dry heaving but no persistent vomiting. She denies any Diarrhea. This been going on for several weeks. She has been tested for Covid and it was negative. She states she gets very anxious when she feels she is unwell at all because of her heart history. She denies any significant chest pain or shortness of breath. She reports lightheadedness with ambulation. The history is provided by the patient. Fatigue  This is a new problem. The current episode started more than 1 week ago. The problem has been gradually worsening. There was no focality noted. There has been no fever. Associated symptoms include vomiting and nausea. Pertinent negatives include no shortness of breath, no chest pain and no headaches. Past Medical History:   Diagnosis Date    Arthritis     Atrial fibrillation     AF despite TIkosyn; AF ablation 9/2013; Recurrent AF-AV tasia ablation 8/2015    Bradycardia     DM (diabetes mellitus) ([facility] 11/3/2015    GERD (gastroesophageal reflux disease)     Heart failure (HCC)     h/o EF <35%, now improved to 55%    History of implantable cardioverter-defibrillator (ICD) placement     [employer] BiV ICD    HLD (hyperlipidemia)     Hypertension     ICD (implantable cardioverter-defibrillator), biventricular, in situ     Dual-chamber ICD in [facility] 2011; RA/RV lead revision/upgrade to BiV 3/2013    NSVT (nonsustained ventricular tachycardia) ([address] 5/24/2018    Obesity     BMI 33    Persistent atrial fibrillation ([address] 5/24/2018    Psychiatric disorder     anxiety    PVD (peripheral vascular disease) ([facility] 11/3/2015    Syncope and collapse     Vertigo        Past Surgical History:   Procedure Laterality Date    HX CHOLECYSTECTOMY      HX HEART CATHETERIZATION  08/20/2018    LV EF=55%. Patent coronary arteries with no significant stenosis    HX HYSTERECTOMY      HX IMPLANTABLE CARDIOVERTER DEFIBRILLATOR      HX PACEMAKER      HX TONSILLECTOMY           Family History:   Problem Relation Age of Onset    Heart Disease Father         MI at 48yrs    No Known Problems Sister     No Known Problems Brother     No Known Problems Brother        Social History     Socioeconomic History    Marital status:      Spouse name: Not on file    Number of children: Not on file    Years of education: Not on file    Highest education level: Not on file   Occupational History    Not on file   Tobacco Use    Smoking status: Never Smoker    Smokeless tobacco: Never Used   Substance and Sexual Activity    Alcohol use: No    Drug use: No    Sexual activity: Not on file   Other Topics Concern    Not on file   Social History Narrative    Not on file     Social Determinants of Health     Financial Resource Strain:     Difficulty of Paying Living Expenses:    Food Insecurity:     Worried About Running Out of Food in the Last Year:     Ran Out of Food in the Last Year:    Transportation Needs:     Lack of Transportation (Medical):  Lack of Transportation (Non-Medical):    Physical Activity:     Days of Exercise per Week:     Minutes of Exercise per Session:    Stress:     Feeling of Stress :    Social Connections:     Frequency of Communication with Friends and Family:     Frequency of Social Gatherings with Friends and Family:     Attends Christian Services:     Active Member of Clubs or Organizations:     Attends Club or Organization Meetings:     Marital Status:    Intimate Partner Violence:     Fear of Current or Ex-Partner:     Emotionally Abused:     Physically Abused:     Sexually Abused: ALLERGIES: Darvocet a500 [propoxyphene n-acetaminophen] and Zithromax [azithromycin]    Review of Systems   Constitutional: Positive for fatigue. Respiratory: Negative for shortness of breath. Cardiovascular: Negative for chest pain.    Gastrointestinal: Positive for nausea and vomiting. Neurological: Negative for headaches. All other systems reviewed and are negative. Vitals:    08/23/21 1243 08/23/21 1244 08/23/21 1245 08/23/21 1431   BP: (!) 140/59 139/71 129/64 138/80   Pulse: 80 79 81 73   Resp:       Temp:       SpO2: 99% 95% 95% 94%            Physical Exam  Vitals and nursing note reviewed. Constitutional:       General: She is not in acute distress. Appearance: Normal appearance. She is well-developed. She is obese. She is not diaphoretic. HENT:      Head: Normocephalic and atraumatic. Eyes:      General:         Right eye: No discharge. Left eye: No discharge. Conjunctiva/sclera: Conjunctivae normal.   Cardiovascular:      Rate and Rhythm: Normal rate and regular rhythm. Pulmonary:      Effort: Pulmonary effort is normal. No respiratory distress. Breath sounds: Normal breath sounds. Abdominal:      General: There is no distension. Palpations: Abdomen is soft. Tenderness: There is no abdominal tenderness. Musculoskeletal:         General: Normal range of motion. Cervical back: Normal range of motion and neck supple. Right lower leg: No edema. Left lower leg: No edema. Skin:     General: Skin is warm and dry. Capillary Refill: Capillary refill takes less than 2 seconds. Neurological:      General: No focal deficit present. Mental Status: She is alert and oriented to person, place, and time. Cranial Nerves: No cranial nerve deficit. Psychiatric:         Mood and Affect: Mood normal.         Behavior: Behavior normal.          MDM  Number of Diagnoses or Management Options  Dehydration  Non-intractable vomiting with nausea, unspecified vomiting type  Thrombocytopenia (HCC)  Diagnosis management comments: Patient was hypertensive when she came in. She was given fluids and improved and was not orthostatic. She was given nausea medicine and able to drink fluids without difficulty.   She feels significantly better. Her extensive work-up has been normal.  She has a follow-up appointment with cardiology soon. She feels better and is happy with the dispo plan of going home with [facility] medicine. Device interrogation showed some afib but nothing consistent and pt is on coumadin. No ventricular events.          Amount and/or Complexity of Data Reviewed  Clinical lab tests: ordered and reviewed  Tests in the radiology section of CPT®: ordered and reviewed  Review and summarize past medical records: yes  Independent visualization of images, tracings, or specimens: yes (===============================================  ED EKG Interpretation  EKG was interpreted in the absence of a cardiologist.    EKG rhythm: Paced rhythm  Rate: nl  ST Segments: Nonspecific ST segments - NO STEMI      Ophelia Palacio MD; 8/27/2021 @6:12 PM================    )    Risk of Complications, Morbidity, and/or Mortality  Presenting problems: high  Diagnostic procedures: minimal  Management options: moderate    Patient Progress  Patient progress: improved         Procedures

## 2021-08-31 ENCOUNTER — HOSPITAL ENCOUNTER (OUTPATIENT)
Dept: LAB | Age: 71
Discharge: HOME | End: 2021-08-31
Payer: MEDICARE

## 2021-08-31 DIAGNOSIS — D69.6 THROMBOCYTOPENIA (HCC): ICD-10-CM

## 2021-08-31 DIAGNOSIS — D64.9 ANEMIA, UNSPECIFIED TYPE: ICD-10-CM

## 2021-08-31 DIAGNOSIS — D47.1 MPN (MYELOPROLIFERATIVE NEOPLASM) (HCC): ICD-10-CM

## 2021-08-31 LAB
25(OH)D3 SERPL-MCNC: 56.1 NG/ML (ref 30–100)
ALBUMIN SERPL-MCNC: 2.9 G/DL (ref 3.2–4.6)
ALBUMIN/GLOB SERPL: 0.8 {RATIO} (ref 1.2–3.5)
ALP SERPL-CCNC: 159 U/L (ref 50–136)
ALT SERPL-CCNC: 34 U/L (ref 12–65)
ANION GAP SERPL CALC-SCNC: 4 MMOL/L (ref 7–16)
AST SERPL-CCNC: 29 U/L (ref 15–37)
BASOPHILS # BLD: 0 K/UL (ref 0–0.2)
BASOPHILS NFR BLD: 1 % (ref 0–2)
BILIRUB SERPL-MCNC: 0.4 MG/DL (ref 0.2–1.1)
BUN SERPL-MCNC: 11 MG/DL (ref 8–23)
CALCIUM SERPL-MCNC: 9.1 MG/DL (ref 8.3–10.4)
CHLORIDE SERPL-SCNC: 104 MMOL/L (ref 98–107)
CO2 SERPL-SCNC: 27 MMOL/L (ref 21–32)
CREAT SERPL-MCNC: 0.8 MG/DL (ref 0.6–1)
DIFFERENTIAL METHOD BLD: ABNORMAL
EOSINOPHIL # BLD: 0.1 K/UL (ref 0–0.8)
EOSINOPHIL NFR BLD: 1 % (ref 0.5–7.8)
ERYTHROCYTE [DISTWIDTH] IN BLOOD BY AUTOMATED COUNT: 15.1 % (ref 11.9–14.6)
FERRITIN SERPL-MCNC: 10 NG/ML (ref 8–388)
GLOBULIN SER CALC-MCNC: 3.8 G/DL (ref 2.3–3.5)
GLUCOSE SERPL-MCNC: 98 MG/DL (ref 65–100)
HAPTOGLOB SERPL-MCNC: 187 MG/DL (ref 30–200)
HAV IGM SER QL: NONREACTIVE
HBV CORE IGM SER QL: NONREACTIVE
HBV SURFACE AG SER QL: NONREACTIVE
HCT VFR BLD AUTO: 33.3 % (ref 35.8–46.3)
HCV AB SER QL: NONREACTIVE
HGB BLD-MCNC: 10.4 G/DL (ref 11.7–15.4)
HGB RETIC QN AUTO: 27 PG (ref 29–35)
HIV 1+2 AB+HIV1 P24 AG SERPL QL IA: NONREACTIVE
HIV12 RESULT COMMENT, HHIVC: ABNORMAL
IMM GRANULOCYTES # BLD AUTO: 0 K/UL (ref 0–0.5)
IMM GRANULOCYTES NFR BLD AUTO: 1 % (ref 0–5)
IMM RETICS NFR: 16.4 % (ref 3–15.9)
LDH SERPL L TO P-CCNC: 182 U/L (ref 110–210)
LYMPHOCYTES # BLD: 0.5 K/UL (ref 0.5–4.6)
LYMPHOCYTES NFR BLD: 10 % (ref 13–44)
Lab: NORMAL
MCH RBC QN AUTO: 26.5 PG (ref 26.1–32.9)
MCHC RBC AUTO-ENTMCNC: 31.2 G/DL (ref 31.4–35)
MCV RBC AUTO: 84.9 FL (ref 79.6–97.8)
MONOCYTES # BLD: 0.5 K/UL (ref 0.1–1.3)
MONOCYTES NFR BLD: 10 % (ref 4–12)
NEUTS SEG # BLD: 4.3 K/UL (ref 1.7–8.2)
NEUTS SEG NFR BLD: 78 % (ref 43–78)
NRBC # BLD: 0 K/UL (ref 0–0.2)
PLATELET # BLD AUTO: 280 K/UL (ref 150–450)
PMV BLD AUTO: 10.8 FL (ref 9.4–12.3)
POTASSIUM SERPL-SCNC: 4 MMOL/L (ref 3.5–5.1)
PROT SERPL-MCNC: 6.7 G/DL (ref 6.3–8.2)
RBC # BLD AUTO: 3.92 M/UL (ref 4.05–5.2)
REFERENCE LAB,REFLB: NORMAL
RETICS # AUTO: 0.07 M/UL (ref 0.03–0.1)
RETICS/RBC NFR AUTO: 1.8 % (ref 0.3–2)
SODIUM SERPL-SCNC: 135 MMOL/L (ref 136–145)
TEST DESCRIPTION:,ATST: NORMAL
VIT B12 SERPL-MCNC: 3612 PG/ML (ref 193–986)
WBC # BLD AUTO: 5.5 K/UL (ref 4.3–11.1)

## 2021-08-31 PROCEDURE — 83010 ASSAY OF HAPTOGLOBIN QUANT: CPT

## 2021-08-31 PROCEDURE — 82607 VITAMIN B-12: CPT

## 2021-08-31 PROCEDURE — 85025 COMPLETE CBC W/AUTO DIFF WBC: CPT

## 2021-08-31 PROCEDURE — 82728 ASSAY OF FERRITIN: CPT

## 2021-08-31 PROCEDURE — 86677 HELICOBACTER PYLORI ANTIBODY: CPT

## 2021-08-31 PROCEDURE — 83615 LACTATE (LD) (LDH) ENZYME: CPT

## 2021-08-31 PROCEDURE — 82784 ASSAY IGA/IGD/IGG/IGM EACH: CPT

## 2021-08-31 PROCEDURE — 82306 VITAMIN D 25 HYDROXY: CPT

## 2021-08-31 PROCEDURE — 80053 COMPREHEN METABOLIC PANEL: CPT

## 2021-08-31 PROCEDURE — 86147 CARDIOLIPIN ANTIBODY EA IG: CPT

## 2021-08-31 PROCEDURE — 80074 ACUTE HEPATITIS PANEL: CPT

## 2021-08-31 PROCEDURE — 87389 HIV-1 AG W/HIV-1&-2 AB AG IA: CPT

## 2021-08-31 PROCEDURE — 36415 COLL VENOUS BLD VENIPUNCTURE: CPT

## 2021-08-31 PROCEDURE — 86334 IMMUNOFIX E-PHORESIS SERUM: CPT

## 2021-08-31 PROCEDURE — 85046 RETICYTE/HGB CONCENTRATE: CPT

## 2021-09-01 LAB
H PYLORI IGA SER-ACNC: <9 UNITS (ref 0–8.9)
H PYLORI IGG SER IA-ACNC: 0.66 INDEX VALUE (ref 0–0.79)

## 2021-09-02 LAB
ALBUMIN SERPL ELPH-MCNC: 3.12 G/DL (ref 3.2–5.6)
ALBUMIN/GLOB SERPL: 1 {RATIO} (ref 1.2–3.5)
ALPHA1 GLOB SERPL ELPH-MCNC: 0.28 G/DL (ref 0.1–0.4)
ALPHA2 GLOB SERPL ELPH-MCNC: 0.93 G/DL (ref 0.4–1.2)
B-GLOBULIN SERPL QL ELPH: 1.09 G/DL (ref 0.6–1.3)
CARDIOLIPIN IGA SER IA-ACNC: <9 APL U/ML (ref 0–11)
CARDIOLIPIN IGG SER IA-ACNC: <9 GPL U/ML (ref 0–14)
CARDIOLIPIN IGM SER IA-ACNC: 19 MPL U/ML (ref 0–12)
GAMMA GLOB MFR SERPL ELPH: 0.78 G/DL (ref 0.5–1.6)
IGA SERPL-MCNC: 96 MG/DL (ref 85–499)
IGG SERPL-MCNC: 841 MG/DL (ref 610–1616)
IGM SERPL-MCNC: 70 MG/DL (ref 35–242)
M PROTEIN SERPL ELPH-MCNC: ABNORMAL G/DL
PROT PATTERN SERPL ELPH-IMP: ABNORMAL
PROT PATTERN SPEC IFE-IMP: ABNORMAL
PROT SERPL-MCNC: 6.2 G/DL (ref 6.3–8.2)

## 2021-09-07 PROBLEM — D50.8 OTHER IRON DEFICIENCY ANEMIAS: Status: ACTIVE | Noted: 2021-09-07

## 2021-09-30 ENCOUNTER — HOSPITAL ENCOUNTER (OUTPATIENT)
Dept: LAB | Age: 71
Discharge: HOME | End: 2021-09-30
Payer: MEDICARE

## 2021-09-30 DIAGNOSIS — D47.1 MPN (MYELOPROLIFERATIVE NEOPLASM) (HCC): ICD-10-CM

## 2021-09-30 LAB
ALBUMIN SERPL-MCNC: 3.2 G/DL (ref 3.2–4.6)
ALBUMIN/GLOB SERPL: 1 {RATIO} (ref 1.2–3.5)
ALP SERPL-CCNC: 66 U/L (ref 50–136)
ALT SERPL-CCNC: 15 U/L (ref 12–65)
ANION GAP SERPL CALC-SCNC: 5 MMOL/L (ref 7–16)
AST SERPL-CCNC: 11 U/L (ref 15–37)
BASOPHILS # BLD: 0 K/UL (ref 0–0.2)
BASOPHILS NFR BLD: 0 % (ref 0–2)
BILIRUB SERPL-MCNC: 0.3 MG/DL (ref 0.2–1.1)
BUN SERPL-MCNC: 23 MG/DL (ref 8–23)
CALCIUM SERPL-MCNC: 8.8 MG/DL (ref 8.3–10.4)
CHLORIDE SERPL-SCNC: 106 MMOL/L (ref 98–107)
CO2 SERPL-SCNC: 27 MMOL/L (ref 21–32)
CREAT SERPL-MCNC: 0.8 MG/DL (ref 0.6–1)
CRP SERPL HS-MCNC: 2.6 MG/L
DIFFERENTIAL METHOD BLD: ABNORMAL
EOSINOPHIL # BLD: 0 K/UL (ref 0–0.8)
EOSINOPHIL NFR BLD: 0 % (ref 0.5–7.8)
ERYTHROCYTE [DISTWIDTH] IN BLOOD BY AUTOMATED COUNT: 15.7 % (ref 11.9–14.6)
ERYTHROCYTE [SEDIMENTATION RATE] IN BLOOD: 20 MM/HR (ref 0–30)
GLOBULIN SER CALC-MCNC: 3.2 G/DL (ref 2.3–3.5)
GLUCOSE SERPL-MCNC: 151 MG/DL (ref 65–100)
HCT VFR BLD AUTO: 32.5 % (ref 35.8–46.3)
HGB BLD-MCNC: 10.1 G/DL (ref 11.7–15.4)
IMM GRANULOCYTES # BLD AUTO: 0.1 K/UL (ref 0–0.5)
IMM GRANULOCYTES NFR BLD AUTO: 1 % (ref 0–5)
LYMPHOCYTES # BLD: 0.4 K/UL (ref 0.5–4.6)
LYMPHOCYTES NFR BLD: 3 % (ref 13–44)
MCH RBC QN AUTO: 25.8 PG (ref 26.1–32.9)
MCHC RBC AUTO-ENTMCNC: 31.1 G/DL (ref 31.4–35)
MCV RBC AUTO: 82.9 FL (ref 79.6–97.8)
MONOCYTES # BLD: 0.2 K/UL (ref 0.1–1.3)
MONOCYTES NFR BLD: 2 % (ref 4–12)
NEUTS SEG # BLD: 12.6 K/UL (ref 1.7–8.2)
NEUTS SEG NFR BLD: 95 % (ref 43–78)
NRBC # BLD: 0 K/UL (ref 0–0.2)
PLATELET # BLD AUTO: 374 K/UL (ref 150–450)
PMV BLD AUTO: 10.5 FL (ref 9.4–12.3)
POTASSIUM SERPL-SCNC: 4.2 MMOL/L (ref 3.5–5.1)
PROT SERPL-MCNC: 6.4 G/DL (ref 6.3–8.2)
RBC # BLD AUTO: 3.92 M/UL (ref 4.05–5.2)
SODIUM SERPL-SCNC: 138 MMOL/L (ref 136–145)
WBC # BLD AUTO: 13.2 K/UL (ref 4.3–11.1)

## 2021-09-30 PROCEDURE — 36415 COLL VENOUS BLD VENIPUNCTURE: CPT

## 2021-09-30 PROCEDURE — 80053 COMPREHEN METABOLIC PANEL: CPT

## 2021-09-30 PROCEDURE — 86141 C-REACTIVE PROTEIN HS: CPT

## 2021-09-30 PROCEDURE — 85652 RBC SED RATE AUTOMATED: CPT

## 2021-09-30 PROCEDURE — 85025 COMPLETE CBC W/AUTO DIFF WBC: CPT

## 2021-10-16 ENCOUNTER — HOSPITAL ENCOUNTER (OUTPATIENT)
Dept: INFUSION THERAPY | Age: 71
Discharge: HOME | End: 2021-10-16
Payer: MEDICARE

## 2021-10-16 VITALS
SYSTOLIC BLOOD PRESSURE: 132 MMHG | RESPIRATION RATE: 16 BRPM | HEART RATE: 70 BPM | TEMPERATURE: 97.8 F | DIASTOLIC BLOOD PRESSURE: 79 MMHG | OXYGEN SATURATION: 98 %

## 2021-10-16 DIAGNOSIS — D50.8 OTHER IRON DEFICIENCY ANEMIAS: Primary | ICD-10-CM

## 2021-10-16 LAB
FERRITIN SERPL-MCNC: 7 NG/ML (ref 8–388)
HGB BLD-MCNC: 9.7 G/DL (ref 11.7–15.4)
IRON SATN MFR SERPL: 5 %
IRON SERPL-MCNC: 17 UG/DL (ref 35–150)
TIBC SERPL-MCNC: 377 UG/DL (ref 250–450)

## 2021-10-16 PROCEDURE — 82728 ASSAY OF FERRITIN: CPT

## 2021-10-16 PROCEDURE — 83540 ASSAY OF IRON: CPT

## 2021-10-16 PROCEDURE — 85018 HEMOGLOBIN: CPT

## 2021-10-16 PROCEDURE — 74011250636 HC RX REV CODE- 250/636: Performed by: INTERNAL MEDICINE

## 2021-10-16 PROCEDURE — 96365 THER/PROPH/DIAG IV INF INIT: CPT

## 2021-10-16 RX ORDER — SODIUM CHLORIDE 0.9 % (FLUSH) 0.9 %
10 SYRINGE (ML) INJECTION AS NEEDED
Status: DISCONTINUED | OUTPATIENT
Start: 2021-10-16 | End: 2021-10-18 | Stop reason: HOSPADM

## 2021-10-16 RX ORDER — DIPHENHYDRAMINE HYDROCHLORIDE 50 MG/ML
50 INJECTION, SOLUTION INTRAMUSCULAR; INTRAVENOUS AS NEEDED
Status: DISCONTINUED | OUTPATIENT
Start: 2021-10-16 | End: 2021-10-17 | Stop reason: HOSPADM

## 2021-10-16 RX ORDER — HYDROCORTISONE SODIUM SUCCINATE 100 MG/2ML
100 INJECTION, POWDER, FOR SOLUTION INTRAMUSCULAR; INTRAVENOUS AS NEEDED
Status: DISCONTINUED | OUTPATIENT
Start: 2021-10-16 | End: 2021-10-17 | Stop reason: HOSPADM

## 2021-10-16 RX ORDER — SODIUM CHLORIDE 9 MG/ML
25 INJECTION, SOLUTION INTRAVENOUS CONTINUOUS
Status: DISCONTINUED | OUTPATIENT
Start: 2021-10-16 | End: 2021-10-17 | Stop reason: HOSPADM

## 2021-10-16 RX ADMIN — SODIUM CHLORIDE 25 ML/HR: 900 INJECTION, SOLUTION INTRAVENOUS at 14:37

## 2021-10-16 RX ADMIN — FERRIC CARBOXYMALTOSE INJECTION 750 MG: 50 INJECTION, SOLUTION INTRAVENOUS at 15:01

## 2021-10-16 RX ADMIN — Medication 10 ML: at 14:35

## 2021-10-16 RX ADMIN — Medication 10 ML: at 15:48

## 2021-10-16 NOTE — PROGRESS NOTES
Arrived to the [facility]. Injectafer completed. Patient tolerated well without acute reaction or issue. Any issues or concerns during appointment: none.   Patient aware of next infusion appointment on 10/23/21 at 2:30 PM.    Discharged home at 3:50 PM

## 2021-10-23 ENCOUNTER — HOSPITAL ENCOUNTER (OUTPATIENT)
Dept: INFUSION THERAPY | Age: 71
Discharge: HOME | End: 2021-10-23
Payer: MEDICARE

## 2021-10-23 VITALS
OXYGEN SATURATION: 98 % | SYSTOLIC BLOOD PRESSURE: 123 MMHG | HEART RATE: 69 BPM | RESPIRATION RATE: 17 BRPM | TEMPERATURE: 98 F | DIASTOLIC BLOOD PRESSURE: 64 MMHG

## 2021-10-23 DIAGNOSIS — D50.8 OTHER IRON DEFICIENCY ANEMIAS: Primary | ICD-10-CM

## 2021-10-23 PROCEDURE — 74011250636 HC RX REV CODE- 250/636: Performed by: INTERNAL MEDICINE

## 2021-10-23 PROCEDURE — 96365 THER/PROPH/DIAG IV INF INIT: CPT

## 2021-10-23 RX ORDER — SODIUM CHLORIDE 9 MG/ML
25 INJECTION, SOLUTION INTRAVENOUS CONTINUOUS
Status: DISCONTINUED | OUTPATIENT
Start: 2021-10-23 | End: 2021-10-24 | Stop reason: HOSPADM

## 2021-10-23 RX ORDER — SODIUM CHLORIDE 0.9 % (FLUSH) 0.9 %
10 SYRINGE (ML) INJECTION AS NEEDED
Status: DISCONTINUED | OUTPATIENT
Start: 2021-10-23 | End: 2021-10-24 | Stop reason: HOSPADM

## 2021-10-23 RX ADMIN — Medication 10 ML: at 14:25

## 2021-10-23 RX ADMIN — SODIUM CHLORIDE 25 ML/HR: 900 INJECTION, SOLUTION INTRAVENOUS at 14:30

## 2021-10-23 RX ADMIN — FERRIC CARBOXYMALTOSE INJECTION 750 MG: 50 INJECTION, SOLUTION INTRAVENOUS at 14:45

## 2021-10-23 RX ADMIN — Medication 10 ML: at 15:41

## 2021-10-23 NOTE — PROGRESS NOTES
Problem: Knowledge Deficit  Goal: *Verbalizes understanding and describes medication purposes and frequencies  10/23/2021 1449 by George Tyler RN  Outcome: Progressing Towards Goal  10/23/2021 1448 by George Tyler RN  Outcome: Progressing Towards Goal     Problem: Knowledge Deficit  Goal: *Participate in the learning process  Outcome: Progressing Towards Goal  Goal: *Verbalize description of procedure  Outcome: Progressing Towards Goal  Goal: *Verbalizes understanding and describes medication purposes and frequencies  Outcome: Progressing Towards Goal  Goal: *Knowledge of discharge instructions  Outcome: Progressing Towards Goal  Goal: Interventions  Outcome: Progressing Towards Goal     Problem: Patient Education: Go to Patient Education Activity  Goal: Patient/Family Education  Outcome: Progressing Towards Goal

## 2021-10-23 NOTE — PROGRESS NOTES
Arrived to the [facility]. Injectafer infusion completed. Patient tolerated well. Any issues or concerns during appointment: none. Patient aware of next infusion appointment on 10/30/2021 (date) at [phone] (time). Discharged ambulatory.

## 2021-10-30 ENCOUNTER — HOSPITAL ENCOUNTER (OUTPATIENT)
Dept: INFUSION THERAPY | Age: 71
Discharge: HOME | End: 2021-10-30
Payer: MEDICARE

## 2021-10-30 VITALS
TEMPERATURE: 98.4 F | SYSTOLIC BLOOD PRESSURE: 136 MMHG | OXYGEN SATURATION: 96 % | DIASTOLIC BLOOD PRESSURE: 77 MMHG | RESPIRATION RATE: 18 BRPM | HEART RATE: 70 BPM

## 2021-10-30 DIAGNOSIS — D50.8 OTHER IRON DEFICIENCY ANEMIAS: Primary | ICD-10-CM

## 2021-10-30 PROCEDURE — 74011250636 HC RX REV CODE- 250/636: Performed by: INTERNAL MEDICINE

## 2021-10-30 PROCEDURE — 96365 THER/PROPH/DIAG IV INF INIT: CPT

## 2021-10-30 RX ORDER — SODIUM CHLORIDE 9 MG/ML
25 INJECTION, SOLUTION INTRAVENOUS CONTINUOUS
Status: DISCONTINUED | OUTPATIENT
Start: 2021-10-30 | End: 2021-10-31 | Stop reason: HOSPADM

## 2021-10-30 RX ADMIN — SODIUM CHLORIDE 25 ML/HR: 9 INJECTION, SOLUTION INTRAVENOUS at 14:00

## 2021-10-30 RX ADMIN — FERRIC CARBOXYMALTOSE INJECTION 750 MG: 50 INJECTION, SOLUTION INTRAVENOUS at 14:10

## 2021-10-30 NOTE — PROGRESS NOTES
[facility] injectafer completed. Patient tolerated well. Any issues or concerns during appointment: no.  Patient aware of next with [facility] appointment on 11/19. Discharged to home ambulatory.

## 2021-11-19 ENCOUNTER — HOSPITAL ENCOUNTER (OUTPATIENT)
Dept: LAB | Age: 71
Discharge: HOME | End: 2021-11-19
Payer: MEDICARE

## 2021-11-19 DIAGNOSIS — D50.8 OTHER IRON DEFICIENCY ANEMIAS: ICD-10-CM

## 2021-11-19 LAB
ALBUMIN SERPL-MCNC: 3.5 G/DL (ref 3.2–4.6)
ALBUMIN/GLOB SERPL: 1.1 {RATIO} (ref 1.2–3.5)
ALP SERPL-CCNC: 79 U/L (ref 50–136)
ALT SERPL-CCNC: 20 U/L (ref 12–65)
ANION GAP SERPL CALC-SCNC: 6 MMOL/L (ref 7–16)
AST SERPL-CCNC: 11 U/L (ref 15–37)
BASOPHILS # BLD: 0.1 K/UL (ref 0–0.2)
BASOPHILS NFR BLD: 0 % (ref 0–2)
BILIRUB SERPL-MCNC: 0.4 MG/DL (ref 0.2–1.1)
BUN SERPL-MCNC: 15 MG/DL (ref 8–23)
CALCIUM SERPL-MCNC: 8.2 MG/DL (ref 8.3–10.4)
CHLORIDE SERPL-SCNC: 107 MMOL/L (ref 98–107)
CO2 SERPL-SCNC: 25 MMOL/L (ref 21–32)
CREAT SERPL-MCNC: 0.7 MG/DL (ref 0.6–1)
CRP SERPL HS-MCNC: 3.6 MG/L
DIFFERENTIAL METHOD BLD: ABNORMAL
EOSINOPHIL # BLD: 0 K/UL (ref 0–0.8)
EOSINOPHIL NFR BLD: 0 % (ref 0.5–7.8)
ERYTHROCYTE [DISTWIDTH] IN BLOOD BY AUTOMATED COUNT: 22.5 % (ref 11.9–14.6)
ERYTHROCYTE [SEDIMENTATION RATE] IN BLOOD: 14 MM/HR (ref 0–30)
GLOBULIN SER CALC-MCNC: 3.2 G/DL (ref 2.3–3.5)
GLUCOSE SERPL-MCNC: 120 MG/DL (ref 65–100)
HCT VFR BLD AUTO: 38.9 % (ref 35.8–46.3)
HGB BLD-MCNC: 12.2 G/DL (ref 11.7–15.4)
IMM GRANULOCYTES # BLD AUTO: 0.1 K/UL (ref 0–0.5)
IMM GRANULOCYTES NFR BLD AUTO: 1 % (ref 0–5)
LYMPHOCYTES # BLD: 0.4 K/UL (ref 0.5–4.6)
LYMPHOCYTES NFR BLD: 4 % (ref 13–44)
MCH RBC QN AUTO: 27.8 PG (ref 26.1–32.9)
MCHC RBC AUTO-ENTMCNC: 31.4 G/DL (ref 31.4–35)
MCV RBC AUTO: 88.6 FL (ref 79.6–97.8)
MONOCYTES # BLD: 0.4 K/UL (ref 0.1–1.3)
MONOCYTES NFR BLD: 3 % (ref 4–12)
NEUTS SEG # BLD: 10.8 K/UL (ref 1.7–8.2)
NEUTS SEG NFR BLD: 92 % (ref 43–78)
NRBC # BLD: 0 K/UL (ref 0–0.2)
PLATELET # BLD AUTO: 231 K/UL (ref 150–450)
PMV BLD AUTO: 11 FL (ref 9.4–12.3)
POTASSIUM SERPL-SCNC: 4.3 MMOL/L (ref 3.5–5.1)
PROT SERPL-MCNC: 6.7 G/DL (ref 6.3–8.2)
RBC # BLD AUTO: 4.39 M/UL (ref 4.05–5.2)
SODIUM SERPL-SCNC: 138 MMOL/L (ref 136–145)
WBC # BLD AUTO: 11.8 K/UL (ref 4.3–11.1)

## 2021-11-19 PROCEDURE — 85025 COMPLETE CBC W/AUTO DIFF WBC: CPT

## 2021-11-19 PROCEDURE — 36415 COLL VENOUS BLD VENIPUNCTURE: CPT

## 2021-11-19 PROCEDURE — 85652 RBC SED RATE AUTOMATED: CPT

## 2021-11-19 PROCEDURE — 80053 COMPREHEN METABOLIC PANEL: CPT

## 2021-11-19 PROCEDURE — 86141 C-REACTIVE PROTEIN HS: CPT

## 2021-12-09 ENCOUNTER — HOSPITAL ENCOUNTER (OUTPATIENT)
Dept: LAB | Age: 71
Discharge: HOME | End: 2021-12-09
Payer: MEDICARE

## 2021-12-09 ENCOUNTER — HOSPITAL ENCOUNTER (OUTPATIENT)
Dept: GENERAL RADIOLOGY | Age: 71
Discharge: HOME | End: 2021-12-09

## 2021-12-09 DIAGNOSIS — R06.02 SOB (SHORTNESS OF BREATH): ICD-10-CM

## 2021-12-09 DIAGNOSIS — I50.22 CHRONIC SYSTOLIC HEART FAILURE (HCC): ICD-10-CM

## 2021-12-09 LAB
ANION GAP SERPL CALC-SCNC: 7 MMOL/L (ref 7–16)
BASOPHILS # BLD: 0 K/UL (ref 0–0.2)
BASOPHILS NFR BLD: 1 % (ref 0–2)
BNP SERPL-MCNC: 2553 PG/ML (ref 5–125)
BUN SERPL-MCNC: 13 MG/DL (ref 8–23)
CALCIUM SERPL-MCNC: 8.8 MG/DL (ref 8.3–10.4)
CHLORIDE SERPL-SCNC: 108 MMOL/L (ref 98–107)
CO2 SERPL-SCNC: 25 MMOL/L (ref 21–32)
CREAT SERPL-MCNC: 0.61 MG/DL (ref 0.6–1)
DIFFERENTIAL METHOD BLD: ABNORMAL
EOSINOPHIL # BLD: 0 K/UL (ref 0–0.8)
EOSINOPHIL NFR BLD: 0 % (ref 0.5–7.8)
ERYTHROCYTE [DISTWIDTH] IN BLOOD BY AUTOMATED COUNT: 21.6 % (ref 11.9–14.6)
GLUCOSE SERPL-MCNC: 108 MG/DL (ref 65–100)
HCT VFR BLD AUTO: 38.4 % (ref 35.8–46.3)
HGB BLD-MCNC: 11.9 G/DL (ref 11.7–15.4)
IMM GRANULOCYTES # BLD AUTO: 0.1 K/UL (ref 0–0.5)
IMM GRANULOCYTES NFR BLD AUTO: 1 % (ref 0–5)
LYMPHOCYTES # BLD: 0.4 K/UL (ref 0.5–4.6)
LYMPHOCYTES NFR BLD: 5 % (ref 13–44)
MCH RBC QN AUTO: 28.5 PG (ref 26.1–32.9)
MCHC RBC AUTO-ENTMCNC: 31 G/DL (ref 31.4–35)
MCV RBC AUTO: 92.1 FL (ref 79.6–97.8)
MONOCYTES # BLD: 0.3 K/UL (ref 0.1–1.3)
MONOCYTES NFR BLD: 4 % (ref 4–12)
NEUTS SEG # BLD: 6.8 K/UL (ref 1.7–8.2)
NEUTS SEG NFR BLD: 89 % (ref 43–78)
NRBC # BLD: 0 K/UL (ref 0–0.2)
PLATELET # BLD AUTO: 318 K/UL (ref 150–450)
PMV BLD AUTO: 11.6 FL (ref 9.4–12.3)
POTASSIUM SERPL-SCNC: 3.8 MMOL/L (ref 3.5–5.1)
RBC # BLD AUTO: 4.17 M/UL (ref 4.05–5.2)
SODIUM SERPL-SCNC: 140 MMOL/L (ref 136–145)
WBC # BLD AUTO: 7.7 K/UL (ref 4.3–11.1)

## 2021-12-09 PROCEDURE — 36415 COLL VENOUS BLD VENIPUNCTURE: CPT

## 2021-12-09 PROCEDURE — 85025 COMPLETE CBC W/AUTO DIFF WBC: CPT

## 2021-12-09 PROCEDURE — 80048 BASIC METABOLIC PNL TOTAL CA: CPT

## 2021-12-09 PROCEDURE — 83880 ASSAY OF NATRIURETIC PEPTIDE: CPT

## 2022-03-14 ENCOUNTER — HOSPITAL ENCOUNTER (OUTPATIENT)
Dept: LAB | Age: 72
Discharge: HOME | End: 2022-03-14
Payer: MEDICARE

## 2022-03-14 DIAGNOSIS — D50.8 OTHER IRON DEFICIENCY ANEMIA: ICD-10-CM

## 2022-03-14 DIAGNOSIS — E88.89 OTHER SPECIFIED METABOLIC DISORDERS (HCC): ICD-10-CM

## 2022-03-14 LAB
ALBUMIN SERPL-MCNC: 3.3 G/DL (ref 3.2–4.6)
ALBUMIN/GLOB SERPL: 1.1 {RATIO} (ref 1.2–3.5)
ALP SERPL-CCNC: 79 U/L (ref 50–136)
ALT SERPL-CCNC: 31 U/L (ref 12–65)
ANION GAP SERPL CALC-SCNC: 6 MMOL/L (ref 7–16)
AST SERPL-CCNC: 21 U/L (ref 15–37)
BASOPHILS # BLD: 0 K/UL (ref 0–0.2)
BASOPHILS NFR BLD: 0 % (ref 0–2)
BILIRUB SERPL-MCNC: 0.3 MG/DL (ref 0.2–1.1)
BUN SERPL-MCNC: 13 MG/DL (ref 8–23)
CALCIUM SERPL-MCNC: 8.9 MG/DL (ref 8.3–10.4)
CHLORIDE SERPL-SCNC: 104 MMOL/L (ref 98–107)
CO2 SERPL-SCNC: 28 MMOL/L (ref 21–32)
CREAT SERPL-MCNC: 0.9 MG/DL (ref 0.6–1)
CRP SERPL HS-MCNC: 4.8 MG/L
DIFFERENTIAL METHOD BLD: ABNORMAL
EOSINOPHIL # BLD: 0 K/UL (ref 0–0.8)
EOSINOPHIL NFR BLD: 0 % (ref 0.5–7.8)
ERYTHROCYTE [DISTWIDTH] IN BLOOD BY AUTOMATED COUNT: 15.5 % (ref 11.9–14.6)
ERYTHROCYTE [SEDIMENTATION RATE] IN BLOOD: 16 MM/HR (ref 0–30)
FERRITIN SERPL-MCNC: 583 NG/ML (ref 8–388)
GLOBULIN SER CALC-MCNC: 3 G/DL (ref 2.3–3.5)
GLUCOSE SERPL-MCNC: 102 MG/DL (ref 65–100)
HCT VFR BLD AUTO: 36.9 % (ref 35.8–46.3)
HGB BLD-MCNC: 11.7 G/DL (ref 11.7–15.4)
IMM GRANULOCYTES # BLD AUTO: 0.1 K/UL (ref 0–0.5)
IMM GRANULOCYTES NFR BLD AUTO: 1 % (ref 0–5)
IRON SATN MFR SERPL: 40 %
IRON SERPL-MCNC: 89 UG/DL (ref 35–150)
LYMPHOCYTES # BLD: 0.5 K/UL (ref 0.5–4.6)
LYMPHOCYTES NFR BLD: 4 % (ref 13–44)
MCH RBC QN AUTO: 30.2 PG (ref 26.1–32.9)
MCHC RBC AUTO-ENTMCNC: 31.7 G/DL (ref 31.4–35)
MCV RBC AUTO: 95.1 FL (ref 79.6–97.8)
MONOCYTES # BLD: 0.4 K/UL (ref 0.1–1.3)
MONOCYTES NFR BLD: 3 % (ref 4–12)
NEUTS SEG # BLD: 11.7 K/UL (ref 1.7–8.2)
NEUTS SEG NFR BLD: 92 % (ref 43–78)
NRBC # BLD: 0 K/UL (ref 0–0.2)
PLATELET # BLD AUTO: 281 K/UL (ref 150–450)
PMV BLD AUTO: 10.4 FL (ref 9.4–12.3)
POTASSIUM SERPL-SCNC: 4 MMOL/L (ref 3.5–5.1)
PROT SERPL-MCNC: 6.3 G/DL (ref 6.3–8.2)
RBC # BLD AUTO: 3.88 M/UL (ref 4.05–5.2)
SODIUM SERPL-SCNC: 138 MMOL/L (ref 136–145)
TIBC SERPL-MCNC: 221 UG/DL (ref 250–450)
WBC # BLD AUTO: 12.7 K/UL (ref 4.3–11.1)

## 2022-03-14 PROCEDURE — 86141 C-REACTIVE PROTEIN HS: CPT

## 2022-03-14 PROCEDURE — 85025 COMPLETE CBC W/AUTO DIFF WBC: CPT

## 2022-03-14 PROCEDURE — 83540 ASSAY OF IRON: CPT

## 2022-03-14 PROCEDURE — 85652 RBC SED RATE AUTOMATED: CPT

## 2022-03-14 PROCEDURE — 80053 COMPREHEN METABOLIC PANEL: CPT

## 2022-03-14 PROCEDURE — 36415 COLL VENOUS BLD VENIPUNCTURE: CPT

## 2022-03-14 PROCEDURE — 82728 ASSAY OF FERRITIN: CPT

## 2022-03-18 PROBLEM — T07.XXXA MULTIPLE TRAUMA: Status: ACTIVE | Noted: 2018-06-09

## 2022-03-18 PROBLEM — R07.2 PRECORDIAL PAIN: Status: ACTIVE | Noted: 2018-06-20

## 2022-03-19 PROBLEM — S22.000A CLOSED COMPRESSION FRACTURE OF THORACIC VERTEBRA (HCC): Status: ACTIVE | Noted: 2018-06-28

## 2022-03-19 PROBLEM — N63.10 BREAST MASS, RIGHT: Status: ACTIVE | Noted: 2019-08-27

## 2022-03-19 PROBLEM — N63.20 LEFT BREAST MASS: Status: ACTIVE | Noted: 2019-08-27

## 2022-03-19 PROBLEM — Z79.899 VISIT FOR MONITORING TIKOSYN THERAPY: Status: ACTIVE | Noted: 2019-09-28

## 2022-03-19 PROBLEM — R93.1 ABNORMAL NUCLEAR CARDIAC IMAGING TEST: Status: ACTIVE | Noted: 2018-06-20

## 2022-03-19 PROBLEM — I48.19 PERSISTENT ATRIAL FIBRILLATION (HCC): Status: ACTIVE | Noted: 2018-05-24

## 2022-03-19 PROBLEM — Z51.81 VISIT FOR MONITORING TIKOSYN THERAPY: Status: ACTIVE | Noted: 2019-09-28

## 2022-03-19 PROBLEM — I47.29 NSVT (NONSUSTAINED VENTRICULAR TACHYCARDIA): Status: ACTIVE | Noted: 2018-05-24

## 2022-03-20 PROBLEM — D50.8 OTHER IRON DEFICIENCY ANEMIAS: Status: ACTIVE | Noted: 2021-09-07

## 2022-03-20 PROBLEM — I47.10 SVT (SUPRAVENTRICULAR TACHYCARDIA): Status: ACTIVE | Noted: 2019-09-27

## 2022-03-20 PROBLEM — I47.1 SVT (SUPRAVENTRICULAR TACHYCARDIA) (HCC): Status: ACTIVE | Noted: 2019-09-27

## 2022-05-27 ENCOUNTER — HOSPITAL ENCOUNTER (OUTPATIENT)
Dept: LAB | Age: 72
Discharge: HOME | End: 2022-05-30
Payer: MEDICARE

## 2022-05-27 ENCOUNTER — TELEPHONE (OUTPATIENT)
Dept: ONCOLOGY | Age: 72
End: 2022-05-27

## 2022-05-27 DIAGNOSIS — D50.8 OTHER IRON DEFICIENCY ANEMIAS: ICD-10-CM

## 2022-05-27 DIAGNOSIS — D50.8 OTHER IRON DEFICIENCY ANEMIAS: Primary | ICD-10-CM

## 2022-05-27 LAB
BASOPHILS # BLD: 0 K/UL (ref 0–0.2)
BASOPHILS NFR BLD: 0 % (ref 0–2)
DIFFERENTIAL METHOD BLD: ABNORMAL
EOSINOPHIL # BLD: 0 K/UL (ref 0–0.8)
EOSINOPHIL NFR BLD: 0 % (ref 0.5–7.8)
ERYTHROCYTE [DISTWIDTH] IN BLOOD BY AUTOMATED COUNT: 13.1 % (ref 11.9–14.6)
FERRITIN SERPL-MCNC: 525 NG/ML (ref 8–388)
HCT VFR BLD AUTO: 40 % (ref 35.8–46.3)
HGB BLD-MCNC: 13.1 G/DL (ref 11.7–15.4)
IMM GRANULOCYTES # BLD AUTO: 0.1 K/UL (ref 0–0.5)
IMM GRANULOCYTES NFR BLD AUTO: 1 % (ref 0–5)
IRON SATN MFR SERPL: 41 %
IRON SERPL-MCNC: 105 UG/DL (ref 35–150)
LYMPHOCYTES # BLD: 0.6 K/UL (ref 0.5–4.6)
LYMPHOCYTES NFR BLD: 5 % (ref 13–44)
MCH RBC QN AUTO: 31.6 PG (ref 26.1–32.9)
MCHC RBC AUTO-ENTMCNC: 32.8 G/DL (ref 31.4–35)
MCV RBC AUTO: 96.6 FL (ref 79.6–97.8)
MONOCYTES # BLD: 0.4 K/UL (ref 0.1–1.3)
MONOCYTES NFR BLD: 3 % (ref 4–12)
NEUTS SEG # BLD: 10.5 K/UL (ref 1.7–8.2)
NEUTS SEG NFR BLD: 90 % (ref 43–78)
NRBC # BLD: 0 K/UL (ref 0–0.2)
PLATELET # BLD AUTO: 316 K/UL (ref 150–450)
PMV BLD AUTO: 10.1 FL (ref 9.4–12.3)
RBC # BLD AUTO: 4.14 M/UL (ref 4.05–5.2)
TIBC SERPL-MCNC: 258 UG/DL (ref 250–450)
WBC # BLD AUTO: 11.6 K/UL (ref 4.3–11.1)

## 2022-05-27 PROCEDURE — 85025 COMPLETE CBC W/AUTO DIFF WBC: CPT

## 2022-05-27 PROCEDURE — 83540 ASSAY OF IRON: CPT

## 2022-05-27 PROCEDURE — 82728 ASSAY OF FERRITIN: CPT

## 2022-05-27 PROCEDURE — 36415 COLL VENOUS BLD VENIPUNCTURE: CPT

## 2022-05-27 NOTE — TELEPHONE ENCOUNTER
NP Augustina Hutton reviewed the labs and the patients labs are ok.  Call to her and she Verbalizes understanding of instructions

## 2022-05-27 NOTE — TELEPHONE ENCOUNTER
PT stated was advised by lab to come up and request phone call once lab results are available to review/

## 2022-05-31 ENCOUNTER — OFFICE VISIT (OUTPATIENT)
Dept: CARDIOLOGY CLINIC | Age: 72
End: 2022-05-31
Payer: MEDICARE

## 2022-05-31 ENCOUNTER — TELEPHONE (OUTPATIENT)
Dept: CARDIOLOGY CLINIC | Age: 72
End: 2022-05-31

## 2022-05-31 VITALS
HEIGHT: 61 IN | BODY MASS INDEX: 32.15 KG/M2 | DIASTOLIC BLOOD PRESSURE: 76 MMHG | WEIGHT: 170.3 LBS | HEART RATE: 76 BPM | SYSTOLIC BLOOD PRESSURE: 118 MMHG

## 2022-05-31 DIAGNOSIS — Z95.810 ICD (IMPLANTABLE CARDIOVERTER-DEFIBRILLATOR), BIVENTRICULAR, IN SITU: Primary | ICD-10-CM

## 2022-05-31 DIAGNOSIS — I10 PRIMARY HYPERTENSION: ICD-10-CM

## 2022-05-31 PROCEDURE — G8427 DOCREV CUR MEDS BY ELIG CLIN: HCPCS | Performed by: INTERNAL MEDICINE

## 2022-05-31 PROCEDURE — 4004F PT TOBACCO SCREEN RCVD TLK: CPT | Performed by: INTERNAL MEDICINE

## 2022-05-31 PROCEDURE — 1090F PRES/ABSN URINE INCON ASSESS: CPT | Performed by: INTERNAL MEDICINE

## 2022-05-31 PROCEDURE — 3017F COLORECTAL CA SCREEN DOC REV: CPT | Performed by: INTERNAL MEDICINE

## 2022-05-31 PROCEDURE — 1123F ACP DISCUSS/DSCN MKR DOCD: CPT | Performed by: INTERNAL MEDICINE

## 2022-05-31 PROCEDURE — G8400 PT W/DXA NO RESULTS DOC: HCPCS | Performed by: INTERNAL MEDICINE

## 2022-05-31 PROCEDURE — G8417 CALC BMI ABV UP PARAM F/U: HCPCS | Performed by: INTERNAL MEDICINE

## 2022-05-31 PROCEDURE — 99214 OFFICE O/P EST MOD 30 MIN: CPT | Performed by: INTERNAL MEDICINE

## 2022-05-31 RX ORDER — CARVEDILOL 12.5 MG/1
12.5 TABLET ORAL 2 TIMES DAILY
Qty: 180 TABLET | Refills: 3 | Status: SHIPPED | OUTPATIENT
Start: 2022-05-31

## 2022-05-31 ASSESSMENT — ENCOUNTER SYMPTOMS
WHEEZING: 0
BOWEL INCONTINENCE: 0
BLURRED VISION: 0
ABDOMINAL PAIN: 0
DIARRHEA: 0
SPUTUM PRODUCTION: 0
HOARSE VOICE: 0
HEMATOCHEZIA: 0
ORTHOPNEA: 0
COLOR CHANGE: 0
SHORTNESS OF BREATH: 0
HEMATEMESIS: 0
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
## 2022-05-31 NOTE — TELEPHONE ENCOUNTER
Patient called and states she was speaking with Dr. Chong Gunn nurse earlier and wanted to get a message to her that she is willing to go see him in [facility] if needed.

## 2022-05-31 NOTE — TELEPHONE ENCOUNTER
Pt c/o dizziness and low BP x 6 days. /72 prior to meds; 101/62 after medications. 5/27/22 CBC WNL    Pt taking lasix 40mg daily                Coreg 12.5mg bid                Lisinopril 20mg daily    Cut lisinopril back to 10mg daily yesterday; still dizzy after meds. Asking for appt with Dr. Lesley Dickinson Friday 6/3/22 after device check at [facility]. Triage will discuss with Dr. Lesley Dickinson and call pt back with his response. Pt will hold lasix today and hydrate with water.

## 2022-06-03 ENCOUNTER — NURSE ONLY (OUTPATIENT)
Dept: CARDIOLOGY CLINIC | Age: 72
End: 2022-06-03
Payer: MEDICARE

## 2022-06-03 DIAGNOSIS — I47.29 NSVT (NONSUSTAINED VENTRICULAR TACHYCARDIA): Primary | ICD-10-CM

## 2022-06-06 PROCEDURE — 93289 INTERROG DEVICE EVAL HEART: CPT | Performed by: INTERNAL MEDICINE

## 2022-06-15 ENCOUNTER — OFFICE VISIT (OUTPATIENT)
Dept: CARDIOLOGY CLINIC | Age: 72
End: 2022-06-15
Payer: MEDICARE

## 2022-06-15 VITALS
HEIGHT: 61 IN | WEIGHT: 175 LBS | DIASTOLIC BLOOD PRESSURE: 68 MMHG | BODY MASS INDEX: 33.04 KG/M2 | SYSTOLIC BLOOD PRESSURE: 130 MMHG | HEART RATE: 62 BPM

## 2022-06-15 DIAGNOSIS — I47.29 NSVT (NONSUSTAINED VENTRICULAR TACHYCARDIA): ICD-10-CM

## 2022-06-15 DIAGNOSIS — I48.0 PAF (PAROXYSMAL ATRIAL FIBRILLATION) (HCC): Primary | ICD-10-CM

## 2022-06-15 DIAGNOSIS — Z95.810 ICD (IMPLANTABLE CARDIOVERTER-DEFIBRILLATOR), BIVENTRICULAR, IN SITU: ICD-10-CM

## 2022-06-15 DIAGNOSIS — I50.22 CHRONIC SYSTOLIC HEART FAILURE (HCC): ICD-10-CM

## 2022-06-15 DIAGNOSIS — I10 PRIMARY HYPERTENSION: ICD-10-CM

## 2022-06-15 DIAGNOSIS — Z95.810 BIVENTRICULAR IMPLANTABLE CARDIOVERTER-DEFIBRILLATOR IN SITU: ICD-10-CM

## 2022-06-15 PROCEDURE — 99214 OFFICE O/P EST MOD 30 MIN: CPT | Performed by: INTERNAL MEDICINE

## 2022-06-15 PROCEDURE — 1090F PRES/ABSN URINE INCON ASSESS: CPT | Performed by: INTERNAL MEDICINE

## 2022-06-15 PROCEDURE — 1123F ACP DISCUSS/DSCN MKR DOCD: CPT | Performed by: INTERNAL MEDICINE

## 2022-06-15 PROCEDURE — G8427 DOCREV CUR MEDS BY ELIG CLIN: HCPCS | Performed by: INTERNAL MEDICINE

## 2022-06-15 PROCEDURE — 93000 ELECTROCARDIOGRAM COMPLETE: CPT | Performed by: INTERNAL MEDICINE

## 2022-06-15 PROCEDURE — G8400 PT W/DXA NO RESULTS DOC: HCPCS | Performed by: INTERNAL MEDICINE

## 2022-06-15 PROCEDURE — G8417 CALC BMI ABV UP PARAM F/U: HCPCS | Performed by: INTERNAL MEDICINE

## 2022-06-15 PROCEDURE — 1036F TOBACCO NON-USER: CPT | Performed by: INTERNAL MEDICINE

## 2022-06-15 PROCEDURE — 3017F COLORECTAL CA SCREEN DOC REV: CPT | Performed by: INTERNAL MEDICINE

## 2022-06-15 RX ORDER — PHENOL 1.4 %
AEROSOL, SPRAY (ML) MUCOUS MEMBRANE DAILY
COMMUNITY
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
## 2022-07-13 ENCOUNTER — OFFICE VISIT (OUTPATIENT)
Dept: CARDIOLOGY CLINIC | Age: 72
End: 2022-07-13
Payer: MEDICARE

## 2022-07-13 VITALS
SYSTOLIC BLOOD PRESSURE: 114 MMHG | HEART RATE: 88 BPM | DIASTOLIC BLOOD PRESSURE: 62 MMHG | WEIGHT: 174 LBS | BODY MASS INDEX: 32.85 KG/M2 | HEIGHT: 61 IN

## 2022-07-13 DIAGNOSIS — Z95.810 BIVENTRICULAR IMPLANTABLE CARDIOVERTER-DEFIBRILLATOR IN SITU: ICD-10-CM

## 2022-07-13 DIAGNOSIS — I48.19 PERSISTENT ATRIAL FIBRILLATION (HCC): Primary | ICD-10-CM

## 2022-07-13 DIAGNOSIS — I47.29 NSVT (NONSUSTAINED VENTRICULAR TACHYCARDIA): ICD-10-CM

## 2022-07-13 DIAGNOSIS — I10 PRIMARY HYPERTENSION: ICD-10-CM

## 2022-07-13 PROCEDURE — G8427 DOCREV CUR MEDS BY ELIG CLIN: HCPCS | Performed by: INTERNAL MEDICINE

## 2022-07-13 PROCEDURE — 1123F ACP DISCUSS/DSCN MKR DOCD: CPT | Performed by: INTERNAL MEDICINE

## 2022-07-13 PROCEDURE — 1090F PRES/ABSN URINE INCON ASSESS: CPT | Performed by: INTERNAL MEDICINE

## 2022-07-13 PROCEDURE — G8417 CALC BMI ABV UP PARAM F/U: HCPCS | Performed by: INTERNAL MEDICINE

## 2022-07-13 PROCEDURE — 1036F TOBACCO NON-USER: CPT | Performed by: INTERNAL MEDICINE

## 2022-07-13 PROCEDURE — 3017F COLORECTAL CA SCREEN DOC REV: CPT | Performed by: INTERNAL MEDICINE

## 2022-07-13 PROCEDURE — 99214 OFFICE O/P EST MOD 30 MIN: CPT | Performed by: INTERNAL MEDICINE

## 2022-07-13 PROCEDURE — G8400 PT W/DXA NO RESULTS DOC: HCPCS | Performed by: INTERNAL MEDICINE

## 2022-07-13 ASSESSMENT — ENCOUNTER SYMPTOMS
WHEEZING: 0
HEMATEMESIS: 0
BOWEL INCONTINENCE: 0
HOARSE VOICE: 0
DIARRHEA: 0
SPUTUM PRODUCTION: 0
BLURRED VISION: 0
HEMATOCHEZIA: 0
ABDOMINAL PAIN: 0
COLOR CHANGE: 0
SHORTNESS OF BREATH: 0
ORTHOPNEA: 0
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
## 2022-07-28 ENCOUNTER — OFFICE VISIT (OUTPATIENT)
Dept: UROLOGY | Age: 72
End: 2022-07-28
Payer: MEDICARE

## 2022-07-28 DIAGNOSIS — N39.41 URGE INCONTINENCE: Primary | ICD-10-CM

## 2022-07-28 PROCEDURE — 64561 IMPLANT NEUROELECTRODES: CPT | Performed by: UROLOGY

## 2022-07-28 NOTE — PROGRESS NOTES
INTERSTIM OFFICE PROCEDURE: NOTE:     Patient: Jarett Millan MRN: [id number]  SSN: [id number]    YOB: 1950  Age: 66 y.o. Sex: female      Date of Procedure:  7/28/2022    Preoperative Diagnosis:  Urge Urinary Incontinence    Postoperative Diagnosis:  Same     Procedure:  Interstim Temporary Bilateral Percutaneous Lead Placement (P&E)    Surgeon:  Denise Demarco. Deja Ware MD    Anesthesia:  Local     Assistant: None    Findings:  Bilateral Lead Placement with good response at 0.5 volts on R and 1.0 volts on L. Estimated Blood Loss: 1 cc    After informed consent was obtained, the patient was taken to the procedure room, placed in the prone position on the table. The patient was prepped and draped in usual surgical fashion. Tape was placed on each buttock and pulled laterally to help in visualization of the anal sphincter. The coccyx was identified. A ruler was used to measure 9 cm superior to this and a pete was placed at midline. A ruler was then used to pete 2 cm to the right and left of this 9 cm pete. Next, the skin was anesthetized with 1% Lidocaine without epinephrine. The anesthetic was placed at the skin insertion point and at the periosteal level for the lead insertion. Care was taken not to anesthetize the foramen. A foramen needle was then inserted 1 cm superior to the 2 cm pete and marched along the sacrum until it entered the S3 foramen without difficulty. Proper needle position was confirmed by direct observation of lifting of the perineum or \"bellowing\", pulling sensation in rectum/vagina and the observance of plantar flexion of the great toe using the test stimulator box. The needle stylet was removed, and a wire was placed until resistance was met and the first coil entered the needle. Seldinger technique was used to back the entry needle off of the wire leaving the wire in place.   The same technique was used at the 2 cm pete on the other side to achieve wire placement. Once both wires were in place on the right and left, they were secured with steri-strips. Testing/programming instructions were provided to the patient after the procedure. All sponge, needle, and instrument counts were correct x2. The patient was placed in the supine position and left the clinic in stable condition. Rk Antonio M.D.     [facility] Urology  [address]  Phone: [phone]  Fax: [phone]

## 2022-08-01 ENCOUNTER — OFFICE VISIT (OUTPATIENT)
Dept: UROLOGY | Age: 72
End: 2022-08-01
Payer: MEDICARE

## 2022-08-01 DIAGNOSIS — N32.81 OAB (OVERACTIVE BLADDER): ICD-10-CM

## 2022-08-01 DIAGNOSIS — N39.41 URGE INCONTINENCE: Primary | ICD-10-CM

## 2022-08-01 LAB
BILIRUBIN, URINE, POC: ABNORMAL
BLOOD URINE, POC: ABNORMAL
GLUCOSE URINE, POC: NEGATIVE
KETONES, URINE, POC: NEGATIVE
LEUKOCYTE ESTERASE, URINE, POC: NEGATIVE
NITRITE, URINE, POC: NEGATIVE
PH, URINE, POC: 7 (ref 4.6–8)
PROTEIN,URINE, POC: ABNORMAL
SPECIFIC GRAVITY, URINE, POC: 1.01 (ref 1–1.03)
URINALYSIS CLARITY, POC: ABNORMAL
URINALYSIS COLOR, POC: ABNORMAL
UROBILINOGEN, POC: ABNORMAL

## 2022-08-01 PROCEDURE — 1036F TOBACCO NON-USER: CPT | Performed by: NURSE PRACTITIONER

## 2022-08-01 PROCEDURE — 0509F URINE INCON PLAN DOCD: CPT | Performed by: NURSE PRACTITIONER

## 2022-08-01 PROCEDURE — 99214 OFFICE O/P EST MOD 30 MIN: CPT | Performed by: NURSE PRACTITIONER

## 2022-08-01 PROCEDURE — 81003 URINALYSIS AUTO W/O SCOPE: CPT | Performed by: NURSE PRACTITIONER

## 2022-08-01 PROCEDURE — G8400 PT W/DXA NO RESULTS DOC: HCPCS | Performed by: NURSE PRACTITIONER

## 2022-08-01 PROCEDURE — 3017F COLORECTAL CA SCREEN DOC REV: CPT | Performed by: NURSE PRACTITIONER

## 2022-08-01 PROCEDURE — 1090F PRES/ABSN URINE INCON ASSESS: CPT | Performed by: NURSE PRACTITIONER

## 2022-08-01 PROCEDURE — G8417 CALC BMI ABV UP PARAM F/U: HCPCS | Performed by: NURSE PRACTITIONER

## 2022-08-01 PROCEDURE — G8427 DOCREV CUR MEDS BY ELIG CLIN: HCPCS | Performed by: NURSE PRACTITIONER

## 2022-08-01 PROCEDURE — 1123F ACP DISCUSS/DSCN MKR DOCD: CPT | Performed by: NURSE PRACTITIONER
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
## 2022-08-02 ENCOUNTER — TELEPHONE (OUTPATIENT)
Dept: ONCOLOGY | Age: 72
End: 2022-08-02

## 2022-08-02 NOTE — TELEPHONE ENCOUNTER
Calling to reschedule Dr Vivien Acuña appt .  She received a letter stating that she needs to reschedule

## 2022-08-04 ENCOUNTER — TELEPHONE (OUTPATIENT)
Dept: UROLOGY | Age: 72
End: 2022-08-04

## 2022-08-04 NOTE — TELEPHONE ENCOUNTER
----- Message from Francis Foley sent at 8/1/2022  4:58 PM EDT -----    ----- Message -----  From: LYUDMILA Kumar - CNP  Sent: 8/1/2022  10:56 AM EDT  To: Ashish Rayo MA    Interstim placement w ezequiel. Need to hold warfarin.

## 2022-08-08 ENCOUNTER — TELEPHONE (OUTPATIENT)
Dept: UROLOGY | Age: 72
End: 2022-08-08

## 2022-08-08 PROBLEM — N39.41 URGE INCONTINENCE: Status: ACTIVE | Noted: 2022-08-08

## 2022-08-26 ENCOUNTER — TELEPHONE (OUTPATIENT)
Dept: UROLOGY | Age: 72
End: 2022-08-26

## 2022-08-26 NOTE — TELEPHONE ENCOUNTER
Is it ok for the patient to hold coumadin 5 days prior to her procedure for a sacral nerve stimulator implant?

## 2022-08-31 ENCOUNTER — ANESTHESIA EVENT (OUTPATIENT)
Dept: SURGERY | Age: 72
End: 2022-08-31
Payer: MEDICARE

## 2022-08-31 NOTE — TELEPHONE ENCOUNTER
The patient has been holding the coumadin. Is this ok? If not I will have her start it back. Please respond back asap as surgery is tomorrow.      Thank Nadia Leyva-surgery scheduler

## 2022-08-31 NOTE — PERIOP NOTE
Directly informed patient and or family member of pre op arrival time 36 on 9/1. All questions answered. Pre op instructions reviewed. Left contact information for any additional questions or needs.

## 2022-09-01 ENCOUNTER — APPOINTMENT (OUTPATIENT)
Dept: GENERAL RADIOLOGY | Age: 72
End: 2022-09-01
Attending: UROLOGY
Payer: MEDICARE

## 2022-09-01 ENCOUNTER — HOSPITAL ENCOUNTER (OUTPATIENT)
Age: 72
Setting detail: OUTPATIENT SURGERY
Discharge: HOME | End: 2022-09-01
Attending: UROLOGY | Admitting: UROLOGY
Payer: MEDICARE

## 2022-09-01 ENCOUNTER — ANESTHESIA (OUTPATIENT)
Dept: SURGERY | Age: 72
End: 2022-09-01
Payer: MEDICARE

## 2022-09-01 VITALS
RESPIRATION RATE: 16 BRPM | WEIGHT: 177.4 LBS | OXYGEN SATURATION: 96 % | HEART RATE: 67 BPM | SYSTOLIC BLOOD PRESSURE: 104 MMHG | BODY MASS INDEX: 33.49 KG/M2 | HEIGHT: 61 IN | DIASTOLIC BLOOD PRESSURE: 55 MMHG | TEMPERATURE: 98 F

## 2022-09-01 DIAGNOSIS — N39.41 URGE INCONTINENCE: ICD-10-CM

## 2022-09-01 LAB
ANION GAP SERPL CALC-SCNC: 3 MMOL/L (ref 4–13)
APTT PPP: 30.8 SEC (ref 24.1–35.1)
BUN SERPL-MCNC: 14 MG/DL (ref 8–23)
CALCIUM SERPL-MCNC: 8.7 MG/DL (ref 8.3–10.4)
CHLORIDE SERPL-SCNC: 110 MMOL/L (ref 101–110)
CO2 SERPL-SCNC: 26 MMOL/L (ref 21–32)
CREAT SERPL-MCNC: 0.8 MG/DL (ref 0.6–1)
EKG ATRIAL RATE: 64 BPM
EKG DIAGNOSIS: NORMAL
EKG P AXIS: 66 DEGREES
EKG P-R INTERVAL: 172 MS
EKG Q-T INTERVAL: 522 MS
EKG QRS DURATION: 134 MS
EKG QTC CALCULATION (BAZETT): 538 MS
EKG R AXIS: -83 DEGREES
EKG T AXIS: 47 DEGREES
EKG VENTRICULAR RATE: 64 BPM
ERYTHROCYTE [DISTWIDTH] IN BLOOD BY AUTOMATED COUNT: 13.5 % (ref 11.9–14.6)
GLUCOSE SERPL-MCNC: 95 MG/DL (ref 65–100)
HCT VFR BLD AUTO: 34.6 % (ref 35.8–46.3)
HGB BLD-MCNC: 11.1 G/DL (ref 11.7–15.4)
INR BLD: 1.2 (ref 0.9–1.2)
INR PPP: 1.1
MAGNESIUM SERPL-MCNC: 2 MG/DL (ref 1.8–2.4)
MCH RBC QN AUTO: 30.4 PG (ref 26.1–32.9)
MCHC RBC AUTO-ENTMCNC: 32.1 G/DL (ref 31.4–35)
MCV RBC AUTO: 94.8 FL (ref 79.6–97.8)
NRBC # BLD: 0 K/UL (ref 0–0.2)
PLATELET # BLD AUTO: 236 K/UL (ref 150–450)
PMV BLD AUTO: 10.7 FL (ref 9.4–12.3)
POTASSIUM BLD-SCNC: 3.8 MMOL/L (ref 3.5–5.1)
POTASSIUM SERPL-SCNC: 3.9 MMOL/L (ref 3.5–5.1)
PROTHROMBIN TIME: 14.2 SEC (ref 12.6–14.5)
PT BLD: 14.5 SECS (ref 9.6–11.6)
RBC # BLD AUTO: 3.65 M/UL (ref 4.05–5.2)
SODIUM SERPL-SCNC: 139 MMOL/L (ref 136–145)
WBC # BLD AUTO: 5.6 K/UL (ref 4.3–11.1)

## 2022-09-01 PROCEDURE — 76000 FLUOROSCOPY <1 HR PHYS/QHP: CPT | Performed by: UROLOGY

## 2022-09-01 PROCEDURE — A4217 STERILE WATER/SALINE, 500 ML: HCPCS | Performed by: UROLOGY

## 2022-09-01 PROCEDURE — 3700000001 HC ADD 15 MINUTES (ANESTHESIA): Performed by: UROLOGY

## 2022-09-01 PROCEDURE — C1778 LEAD, NEUROSTIMULATOR: HCPCS | Performed by: UROLOGY

## 2022-09-01 PROCEDURE — 95972 ALYS CPLX SP/PN NPGT W/PRGRM: CPT | Performed by: UROLOGY

## 2022-09-01 PROCEDURE — 7100000010 HC PHASE II RECOVERY - FIRST 15 MIN: Performed by: UROLOGY

## 2022-09-01 PROCEDURE — 6360000002 HC RX W HCPCS

## 2022-09-01 PROCEDURE — 85730 THROMBOPLASTIN TIME PARTIAL: CPT

## 2022-09-01 PROCEDURE — 3600000013 HC SURGERY LEVEL 3 ADDTL 15MIN: Performed by: UROLOGY

## 2022-09-01 PROCEDURE — 80048 BASIC METABOLIC PNL TOTAL CA: CPT

## 2022-09-01 PROCEDURE — 7100000000 HC PACU RECOVERY - FIRST 15 MIN: Performed by: UROLOGY

## 2022-09-01 PROCEDURE — 6360000002 HC RX W HCPCS: Performed by: UROLOGY

## 2022-09-01 PROCEDURE — 85027 COMPLETE CBC AUTOMATED: CPT

## 2022-09-01 PROCEDURE — C1787 PATIENT PROGR, NEUROSTIM: HCPCS | Performed by: UROLOGY

## 2022-09-01 PROCEDURE — 93005 ELECTROCARDIOGRAM TRACING: CPT | Performed by: STUDENT IN AN ORGANIZED HEALTH CARE EDUCATION/TRAINING PROGRAM

## 2022-09-01 PROCEDURE — 7100000001 HC PACU RECOVERY - ADDTL 15 MIN: Performed by: UROLOGY

## 2022-09-01 PROCEDURE — 83735 ASSAY OF MAGNESIUM: CPT

## 2022-09-01 PROCEDURE — 2709999900 HC NON-CHARGEABLE SUPPLY: Performed by: UROLOGY

## 2022-09-01 PROCEDURE — 3600000003 HC SURGERY LEVEL 3 BASE: Performed by: UROLOGY

## 2022-09-01 PROCEDURE — 64590 INS/RPL PRPH SAC/GSTR NPG/R: CPT | Performed by: UROLOGY

## 2022-09-01 PROCEDURE — 2580000003 HC RX 258: Performed by: ANESTHESIOLOGY

## 2022-09-01 PROCEDURE — 64581 OPN IMPLTJ NEA SACRAL NERVE: CPT | Performed by: UROLOGY

## 2022-09-01 PROCEDURE — 6370000000 HC RX 637 (ALT 250 FOR IP): Performed by: ANESTHESIOLOGY

## 2022-09-01 PROCEDURE — 85610 PROTHROMBIN TIME: CPT

## 2022-09-01 PROCEDURE — 2580000003 HC RX 258: Performed by: UROLOGY

## 2022-09-01 PROCEDURE — 2500000003 HC RX 250 WO HCPCS: Performed by: UROLOGY

## 2022-09-01 PROCEDURE — 7100000011 HC PHASE II RECOVERY - ADDTL 15 MIN: Performed by: UROLOGY

## 2022-09-01 PROCEDURE — 3700000000 HC ANESTHESIA ATTENDED CARE: Performed by: UROLOGY

## 2022-09-01 PROCEDURE — 84132 ASSAY OF SERUM POTASSIUM: CPT

## 2022-09-01 PROCEDURE — C1889 IMPLANT/INSERT DEVICE, NOC: HCPCS | Performed by: UROLOGY

## 2022-09-01 PROCEDURE — C1897 LEAD, NEUROSTIM TEST KIT: HCPCS | Performed by: UROLOGY

## 2022-09-01 PROCEDURE — 2500000003 HC RX 250 WO HCPCS

## 2022-09-01 DEVICE — NEUROSTIMULATOR INTERSTIM X RECHRGE FREE TORQ WRNCH PROD: Type: IMPLANTABLE DEVICE | Site: BACK | Status: FUNCTIONAL

## 2022-09-01 DEVICE — ENVELOPE PLSE GENRTR M W2.7XL2.5IN NEURO ANTIBACT ABSRB: Type: IMPLANTABLE DEVICE | Site: BACK | Status: FUNCTIONAL

## 2022-09-01 RX ORDER — HALOPERIDOL 5 MG/ML
1 INJECTION INTRAMUSCULAR
Status: DISCONTINUED | OUTPATIENT
Start: 2022-09-01 | End: 2022-09-01 | Stop reason: HOSPADM

## 2022-09-01 RX ORDER — FENTANYL CITRATE 50 UG/ML
100 INJECTION, SOLUTION INTRAMUSCULAR; INTRAVENOUS
Status: DISCONTINUED | OUTPATIENT
Start: 2022-09-01 | End: 2022-09-01 | Stop reason: HOSPADM

## 2022-09-01 RX ORDER — IPRATROPIUM BROMIDE AND ALBUTEROL SULFATE 2.5; .5 MG/3ML; MG/3ML
1 SOLUTION RESPIRATORY (INHALATION)
Status: DISCONTINUED | OUTPATIENT
Start: 2022-09-01 | End: 2022-09-01 | Stop reason: HOSPADM

## 2022-09-01 RX ORDER — LIDOCAINE HYDROCHLORIDE 10 MG/ML
INJECTION, SOLUTION INFILTRATION; PERINEURAL PRN
Status: DISCONTINUED | OUTPATIENT
Start: 2022-09-01 | End: 2022-09-01 | Stop reason: ALTCHOICE

## 2022-09-01 RX ORDER — OXYCODONE HYDROCHLORIDE 5 MG/1
5 TABLET ORAL
Status: DISCONTINUED | OUTPATIENT
Start: 2022-09-01 | End: 2022-09-01 | Stop reason: HOSPADM

## 2022-09-01 RX ORDER — HYDROCODONE BITARTRATE AND ACETAMINOPHEN 5; 325 MG/1; MG/1
1 TABLET ORAL EVERY 4 HOURS PRN
Qty: 18 TABLET | Refills: 0 | Status: SHIPPED | OUTPATIENT
Start: 2022-09-01 | End: 2022-09-04

## 2022-09-01 RX ORDER — LIDOCAINE HYDROCHLORIDE 10 MG/ML
1 INJECTION, SOLUTION INFILTRATION; PERINEURAL
Status: DISCONTINUED | OUTPATIENT
Start: 2022-09-01 | End: 2022-09-01 | Stop reason: HOSPADM

## 2022-09-01 RX ORDER — GLYCOPYRROLATE 0.2 MG/ML
INJECTION INTRAMUSCULAR; INTRAVENOUS PRN
Status: DISCONTINUED | OUTPATIENT
Start: 2022-09-01 | End: 2022-09-01 | Stop reason: SDUPTHER

## 2022-09-01 RX ORDER — LIDOCAINE HYDROCHLORIDE 20 MG/ML
INJECTION, SOLUTION EPIDURAL; INFILTRATION; INTRACAUDAL; PERINEURAL PRN
Status: DISCONTINUED | OUTPATIENT
Start: 2022-09-01 | End: 2022-09-01 | Stop reason: SDUPTHER

## 2022-09-01 RX ORDER — SODIUM CHLORIDE, SODIUM LACTATE, POTASSIUM CHLORIDE, CALCIUM CHLORIDE 600; 310; 30; 20 MG/100ML; MG/100ML; MG/100ML; MG/100ML
INJECTION, SOLUTION INTRAVENOUS CONTINUOUS
Status: DISCONTINUED | OUTPATIENT
Start: 2022-09-01 | End: 2022-09-01 | Stop reason: HOSPADM

## 2022-09-01 RX ORDER — SODIUM CHLORIDE 0.9 % (FLUSH) 0.9 %
5-40 SYRINGE (ML) INJECTION PRN
Status: DISCONTINUED | OUTPATIENT
Start: 2022-09-01 | End: 2022-09-01 | Stop reason: HOSPADM

## 2022-09-01 RX ORDER — GENTAMICIN SULFATE 80 MG/100ML
80 INJECTION, SOLUTION INTRAVENOUS
Status: COMPLETED | OUTPATIENT
Start: 2022-09-01 | End: 2022-09-01

## 2022-09-01 RX ORDER — MIDAZOLAM HYDROCHLORIDE 2 MG/2ML
2 INJECTION, SOLUTION INTRAMUSCULAR; INTRAVENOUS
Status: DISCONTINUED | OUTPATIENT
Start: 2022-09-01 | End: 2022-09-01 | Stop reason: HOSPADM

## 2022-09-01 RX ORDER — HYDROMORPHONE HYDROCHLORIDE 2 MG/ML
0.5 INJECTION, SOLUTION INTRAMUSCULAR; INTRAVENOUS; SUBCUTANEOUS EVERY 5 MIN PRN
Status: DISCONTINUED | OUTPATIENT
Start: 2022-09-01 | End: 2022-09-01 | Stop reason: HOSPADM

## 2022-09-01 RX ORDER — SODIUM CHLORIDE 9 MG/ML
INJECTION, SOLUTION INTRAVENOUS PRN
Status: DISCONTINUED | OUTPATIENT
Start: 2022-09-01 | End: 2022-09-01 | Stop reason: HOSPADM

## 2022-09-01 RX ORDER — ACETAMINOPHEN 500 MG
1000 TABLET ORAL ONCE
Status: COMPLETED | OUTPATIENT
Start: 2022-09-01 | End: 2022-09-01

## 2022-09-01 RX ORDER — SODIUM CHLORIDE 0.9 % (FLUSH) 0.9 %
5-40 SYRINGE (ML) INJECTION EVERY 12 HOURS SCHEDULED
Status: DISCONTINUED | OUTPATIENT
Start: 2022-09-01 | End: 2022-09-01 | Stop reason: HOSPADM

## 2022-09-01 RX ORDER — CEPHALEXIN 500 MG/1
500 CAPSULE ORAL 2 TIMES DAILY
Qty: 10 CAPSULE | Refills: 0 | Status: SHIPPED | OUTPATIENT
Start: 2022-09-01 | End: 2022-09-06

## 2022-09-01 RX ORDER — PROPOFOL 10 MG/ML
INJECTION, EMULSION INTRAVENOUS PRN
Status: DISCONTINUED | OUTPATIENT
Start: 2022-09-01 | End: 2022-09-01 | Stop reason: SDUPTHER

## 2022-09-01 RX ORDER — PROCHLORPERAZINE EDISYLATE 5 MG/ML
5 INJECTION INTRAMUSCULAR; INTRAVENOUS
Status: DISCONTINUED | OUTPATIENT
Start: 2022-09-01 | End: 2022-09-01 | Stop reason: HOSPADM

## 2022-09-01 RX ADMIN — SODIUM CHLORIDE, SODIUM LACTATE, POTASSIUM CHLORIDE, AND CALCIUM CHLORIDE: 600; 310; 30; 20 INJECTION, SOLUTION INTRAVENOUS at 07:48

## 2022-09-01 RX ADMIN — PROPOFOL 30 MG: 10 INJECTION, EMULSION INTRAVENOUS at 07:57

## 2022-09-01 RX ADMIN — GENTAMICIN SULFATE 80 MG: 80 INJECTION, SOLUTION INTRAVENOUS at 07:48

## 2022-09-01 RX ADMIN — VANCOMYCIN HYDROCHLORIDE 1000 MG: 1 INJECTION, POWDER, LYOPHILIZED, FOR SOLUTION INTRAVENOUS at 07:07

## 2022-09-01 RX ADMIN — ACETAMINOPHEN 1000 MG: 500 TABLET, FILM COATED ORAL at 07:12

## 2022-09-01 RX ADMIN — GLYCOPYRROLATE 0.2 MG: 0.2 INJECTION, SOLUTION INTRAMUSCULAR; INTRAVENOUS at 08:23

## 2022-09-01 RX ADMIN — LIDOCAINE HYDROCHLORIDE 40 MG: 20 INJECTION, SOLUTION EPIDURAL; INFILTRATION; INTRACAUDAL; PERINEURAL at 07:57

## 2022-09-01 RX ADMIN — PROPOFOL 140 MCG/KG/MIN: 10 INJECTION, EMULSION INTRAVENOUS at 07:58

## 2022-09-01 ASSESSMENT — ENCOUNTER SYMPTOMS: SHORTNESS OF BREATH: 1

## 2022-09-01 ASSESSMENT — PAIN - FUNCTIONAL ASSESSMENT
PAIN_FUNCTIONAL_ASSESSMENT: 0-10
PAIN_FUNCTIONAL_ASSESSMENT: 0-10
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
## 2022-09-01 NOTE — ANESTHESIA POSTPROCEDURE EVALUATION
Department of Anesthesiology  Postprocedure Note    Patient: Case Richardson  MRN: [id number]  YOB: 1950  Date of evaluation: 9/1/2022      Procedure Summary     Date: 09/01/22 Room / Location: [facility] MAIN OR 03 / [facility] MAIN OR    Anesthesia Start: 9587 Anesthesia Stop: 1397    Procedure: STAGE 1 OF 2 COMBINED INTERSTIM PLACEMENT (Back) Diagnosis:       Urge incontinence      (Urge incontinence [N39.41])    Providers: Megan Sears MD Responsible Provider: Moise Teague MD    Anesthesia Type: TIVA ASA Status: 3          Anesthesia Type: TIVA    Meg Phase I: Meg Score: 8    Meg Phase II: Meg Score: 10      Anesthesia Post Evaluation    Patient location during evaluation: PACU  Patient participation: complete - patient participated  Level of consciousness: awake  Airway patency: patent  Nausea & Vomiting: no nausea  Complications: no  Cardiovascular status: hemodynamically stable  Respiratory status: acceptable and nonlabored ventilation  Hydration status: stable  Multimodal analgesia pain management approach

## 2022-09-01 NOTE — DISCHARGE INSTRUCTIONS
over-the-counter products) are added. *  Please carry medication information at all times in case of emergency situations. These are general instructions for a healthy lifestyle:  No smoking/ No tobacco products/ Avoid exposure to second hand smoke  Surgeon General's Warning:  Quitting smoking now greatly reduces serious risk to your health. Obesity, smoking, and sedentary lifestyle greatly increases your risk for illness  A healthy diet, regular physical exercise & weight monitoring are important for maintaining a healthy lifestyle    You may be retaining fluid if you have a history of heart failure or if you experience any of the following symptoms:  Weight gain of 3 pounds or more overnight or 5 pounds in a week, increased swelling in our hands or feet or shortness of breath while lying flat in bed. Please call your doctor as soon as you notice any of these symptoms; do not wait until your next office visit.
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
## 2022-09-01 NOTE — OP NOTE
used my hemostat device to pete my entry points, which were about 1 cm above the S3 foramen pete. I then injected 1% lidocaine without epinephrine to perform a local block at the injection sites. I then passed my introducer stylets into the S3 foramen using my marks as a guide, marching down the sacrum until they dropped into the S3 foramen. I confirmed their position on fluoroscopy. I tested them, readjusted them and re-tested them until I achieved a good response at low voltage. R side was tested first but ultimately the R side provided a better response and was used as the final lead placement site. The patient demonstrated large kimberly and big toe flexion at 0.5 volts. I then removed the inner part of the stylet and inserted my guidewire. I then carefully backloaded the stylet off of the guidewire leaving the guidewire in place. Position of the guidewire was confirmed on fluoroscopy in the S3 foramen. After this, I made an incision approximately 1 cm along the guidewire into the subcutaneous fat. I then passed my dilator sheath with introducer needle over the guidewire into the S3 foramen under fluoroscopy. Once the marker was in position about [facility] across the bone table at the S3 foramen, I then removed the guidewire and the introducer needle leaving the sheath in place. At this point, I then carefully used a curved stylet to pass my InterStim lead into the S3 foramen. I took great care not to push it too far and left leads two and three bridging the bone table at the sacrum at the S3 foramen. I achieved medial to lateral curvature in the classic hockey stick formation and confirmed this on fluoroscopy. Once the lead was in position, I tested it and again and achieved good kimberly and big toe flexion at the settings of 0.5 volts. At this point, once the lead was in position, I then carefully backloaded the sheath off of the lead taking care not to move the lead and deploying the tines.   I then tested the lead again and confirmed correct placement on imaging and the lead continued to test well. After the lead was in position, I completely removed the outside sheath and left the lead for later use. I then turned my attention to the patient's L upper buttock and identified an area of about 4 cm were I could place my subcutaneous pocket for the eventual generator. I took great care to ensure that it was in a position that would not allow the patient to sit on it OR rub it with their waist ban/belt. I then used a 15 blade scalpel as well as electrocautery to develop the pocket into the subcutaneous fat about 0.5 to 1 inch in depth. I then used my passer to pass the InterStim lead from the mid back incision out into the subcutaneous pocket laterally. I irrigated the incision copiously with antibiotic irrigant and then cleaned the leads. I then connected the lead to my InterStim battery pack and secured the screw using the small screwdriver. I then verified that the connection was in the correct place with all four blue visible leads visible in the connection chamber. I then carefully placed the lead and the battery back into the patient's L buttock secured in an antibiotic pouch, taking great care not to kink the lead and taking care to protect it. At this point, I then copiously irrigated the wounds with antibiotic irrigation and I proceeded to achieve hemostasis using electrocautery. We synced the device prior to closure with the  and it demonstrated good function of all of the leads. I then proceeded with my closure. I closed the subcutaneous fat with a 2-0 PDS in an interrupted fashion. I then closed the skin with a 4-0 Monocryl in a running subcuticular fashion. I then applied Dermabond to both the central back wound as well as the buttock pocket wound and injected local anesthesia.   At this point, the patient was then awoken from anesthesia, transferred to the PACU in stable condition. The patient tolerated the procedure well. There were no complications. All counts were correct at the end of the procedure. The InterStim rep then trialed and worked with the patient in the PACU in regards to the device. The patient will follow up with me in 2 weeks for a wound check. Rk Morris M.D.      [facility] Urology  [address]  Phone: [phone]  Fax: [phone]

## 2022-09-15 ENCOUNTER — OFFICE VISIT (OUTPATIENT)
Dept: UROLOGY | Age: 72
End: 2022-09-15
Payer: MEDICARE

## 2022-09-15 DIAGNOSIS — N39.41 URGE INCONTINENCE: Primary | ICD-10-CM

## 2022-09-15 DIAGNOSIS — N32.81 OVERACTIVE BLADDER: ICD-10-CM

## 2022-09-15 DIAGNOSIS — N32.81 OAB (OVERACTIVE BLADDER): ICD-10-CM

## 2022-09-15 LAB
BILIRUBIN, URINE, POC: NEGATIVE
BLOOD URINE, POC: ABNORMAL
GLUCOSE URINE, POC: NEGATIVE
KETONES, URINE, POC: NEGATIVE
LEUKOCYTE ESTERASE, URINE, POC: NEGATIVE
NITRITE, URINE, POC: NEGATIVE
PH, URINE, POC: 6.5 (ref 4.6–8)
PROTEIN,URINE, POC: NEGATIVE
SPECIFIC GRAVITY, URINE, POC: 1.02 (ref 1–1.03)
URINALYSIS CLARITY, POC: ABNORMAL
URINALYSIS COLOR, POC: ABNORMAL
UROBILINOGEN, POC: ABNORMAL

## 2022-09-15 PROCEDURE — 81003 URINALYSIS AUTO W/O SCOPE: CPT | Performed by: NURSE PRACTITIONER
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
## 2022-09-16 DIAGNOSIS — D50.8 OTHER IRON DEFICIENCY ANEMIAS: Primary | ICD-10-CM

## 2022-09-19 ENCOUNTER — HOSPITAL ENCOUNTER (OUTPATIENT)
Dept: LAB | Age: 72
Discharge: HOME | End: 2022-09-22
Payer: MEDICARE

## 2022-09-19 ENCOUNTER — OFFICE VISIT (OUTPATIENT)
Dept: ONCOLOGY | Age: 72
End: 2022-09-19
Payer: MEDICARE

## 2022-09-19 VITALS
SYSTOLIC BLOOD PRESSURE: 111 MMHG | BODY MASS INDEX: 33.72 KG/M2 | HEART RATE: 65 BPM | HEIGHT: 61 IN | RESPIRATION RATE: 18 BRPM | WEIGHT: 178.6 LBS | TEMPERATURE: 98 F | OXYGEN SATURATION: 97 % | DIASTOLIC BLOOD PRESSURE: 63 MMHG

## 2022-09-19 DIAGNOSIS — D50.8 OTHER IRON DEFICIENCY ANEMIA: Primary | ICD-10-CM

## 2022-09-19 DIAGNOSIS — E78.49 OTHER HYPERLIPIDEMIA: ICD-10-CM

## 2022-09-19 DIAGNOSIS — K21.9 CHRONIC GERD: ICD-10-CM

## 2022-09-19 DIAGNOSIS — D50.8 OTHER IRON DEFICIENCY ANEMIAS: ICD-10-CM

## 2022-09-19 LAB
ALBUMIN SERPL-MCNC: 3.5 G/DL (ref 3.2–4.6)
ALBUMIN/GLOB SERPL: 1.1 {RATIO} (ref 1.2–3.5)
ALP SERPL-CCNC: 72 U/L (ref 50–136)
ALT SERPL-CCNC: 21 U/L (ref 12–65)
ANION GAP SERPL CALC-SCNC: 5 MMOL/L (ref 4–13)
AST SERPL-CCNC: 16 U/L (ref 15–37)
BASOPHILS # BLD: 0.1 K/UL (ref 0–0.2)
BASOPHILS NFR BLD: 1 % (ref 0–2)
BILIRUB SERPL-MCNC: 0.3 MG/DL (ref 0.2–1.1)
BUN SERPL-MCNC: 18 MG/DL (ref 8–23)
CALCIUM SERPL-MCNC: 8.9 MG/DL (ref 8.3–10.4)
CHLORIDE SERPL-SCNC: 106 MMOL/L (ref 101–110)
CO2 SERPL-SCNC: 26 MMOL/L (ref 21–32)
CREAT SERPL-MCNC: 0.9 MG/DL (ref 0.6–1)
CRP SERPL-MCNC: <0.3 MG/DL (ref 0–0.9)
DIFFERENTIAL METHOD BLD: NORMAL
EOSINOPHIL # BLD: 0.1 K/UL (ref 0–0.8)
EOSINOPHIL NFR BLD: 1 % (ref 0.5–7.8)
ERYTHROCYTE [DISTWIDTH] IN BLOOD BY AUTOMATED COUNT: 13.5 % (ref 11.9–14.6)
ERYTHROCYTE [SEDIMENTATION RATE] IN BLOOD: 19 MM/HR (ref 0–30)
FERRITIN SERPL-MCNC: 500 NG/ML (ref 8–388)
GLOBULIN SER CALC-MCNC: 3.2 G/DL (ref 2.3–3.5)
GLUCOSE SERPL-MCNC: 102 MG/DL (ref 65–100)
HCT VFR BLD AUTO: 38.5 % (ref 35.8–46.3)
HGB BLD-MCNC: 12.5 G/DL (ref 11.7–15.4)
IMM GRANULOCYTES # BLD AUTO: 0 K/UL (ref 0–0.5)
IMM GRANULOCYTES NFR BLD AUTO: 0 % (ref 0–5)
LYMPHOCYTES # BLD: 1.5 K/UL (ref 0.5–4.6)
LYMPHOCYTES NFR BLD: 18 % (ref 13–44)
MCH RBC QN AUTO: 30.5 PG (ref 26.1–32.9)
MCHC RBC AUTO-ENTMCNC: 32.5 G/DL (ref 31.4–35)
MCV RBC AUTO: 93.9 FL (ref 79.6–97.8)
MONOCYTES # BLD: 0.8 K/UL (ref 0.1–1.3)
MONOCYTES NFR BLD: 9 % (ref 4–12)
NEUTS SEG # BLD: 5.8 K/UL (ref 1.7–8.2)
NEUTS SEG NFR BLD: 71 % (ref 43–78)
NRBC # BLD: 0 K/UL (ref 0–0.2)
PLATELET # BLD AUTO: 265 K/UL (ref 150–450)
PMV BLD AUTO: 10.7 FL (ref 9.4–12.3)
POTASSIUM SERPL-SCNC: 4 MMOL/L (ref 3.5–5.1)
PROT SERPL-MCNC: 6.7 G/DL (ref 6.3–8.2)
RBC # BLD AUTO: 4.1 M/UL (ref 4.05–5.2)
SODIUM SERPL-SCNC: 137 MMOL/L (ref 136–145)
WBC # BLD AUTO: 8.3 K/UL (ref 4.3–11.1)

## 2022-09-19 PROCEDURE — 3017F COLORECTAL CA SCREEN DOC REV: CPT | Performed by: INTERNAL MEDICINE

## 2022-09-19 PROCEDURE — 36415 COLL VENOUS BLD VENIPUNCTURE: CPT

## 2022-09-19 PROCEDURE — G8417 CALC BMI ABV UP PARAM F/U: HCPCS | Performed by: INTERNAL MEDICINE

## 2022-09-19 PROCEDURE — 85652 RBC SED RATE AUTOMATED: CPT

## 2022-09-19 PROCEDURE — 82728 ASSAY OF FERRITIN: CPT

## 2022-09-19 PROCEDURE — 85025 COMPLETE CBC W/AUTO DIFF WBC: CPT

## 2022-09-19 PROCEDURE — G8427 DOCREV CUR MEDS BY ELIG CLIN: HCPCS | Performed by: INTERNAL MEDICINE

## 2022-09-19 PROCEDURE — 80053 COMPREHEN METABOLIC PANEL: CPT

## 2022-09-19 PROCEDURE — 86140 C-REACTIVE PROTEIN: CPT

## 2022-09-19 PROCEDURE — 1090F PRES/ABSN URINE INCON ASSESS: CPT | Performed by: INTERNAL MEDICINE

## 2022-09-19 PROCEDURE — G8400 PT W/DXA NO RESULTS DOC: HCPCS | Performed by: INTERNAL MEDICINE

## 2022-09-19 PROCEDURE — 1036F TOBACCO NON-USER: CPT | Performed by: INTERNAL MEDICINE

## 2022-09-19 PROCEDURE — 1123F ACP DISCUSS/DSCN MKR DOCD: CPT | Performed by: INTERNAL MEDICINE

## 2022-09-19 PROCEDURE — 99215 OFFICE O/P EST HI 40 MIN: CPT | Performed by: INTERNAL MEDICINE

## 2022-09-19 ASSESSMENT — PATIENT HEALTH QUESTIONNAIRE - PHQ9
1. LITTLE INTEREST OR PLEASURE IN DOING THINGS: 0
SUM OF ALL RESPONSES TO PHQ QUESTIONS 1-9: 0
SUM OF ALL RESPONSES TO PHQ QUESTIONS 1-9: 0
SUM OF ALL RESPONSES TO PHQ9 QUESTIONS 1 & 2: 0
SUM OF ALL RESPONSES TO PHQ QUESTIONS 1-9: 0
SUM OF ALL RESPONSES TO PHQ QUESTIONS 1-9: 0
2. FEELING DOWN, DEPRESSED OR HOPELESS: 0

## 2022-09-19 NOTE — PATIENT INSTRUCTIONS
Patient Instructions from Today's Visit    Reason for Visit:  Follow Up    Diagnosis Information:  [email or web address] [email or web address]-us/[email or web address]-answers-patient-education-materials/[email or web address]-[email or web address]-xwws-sheets    Plan:  Lab work looks stable today  We will start seeing you on an annual basis    Follow Up: We will bring you back in May for a follow up with  and lab work prior.     Recent Lab Results:  Hospital Outpatient Visit on 09/19/2022   Component Date Value Ref Range Status    WBC 09/19/2022 8.3  4.3 - 11.1 K/uL Final    RBC 09/19/2022 4.10  4.05 - 5.2 M/uL Final    Hemoglobin 09/19/2022 12.5  11.7 - 15.4 g/dL Final    Hematocrit 09/19/2022 38.5  35.8 - 46.3 % Final    MCV 09/19/2022 93.9  79.6 - 97.8 FL Final    MCH 09/19/2022 30.5  26.1 - 32.9 PG Final    MCHC 09/19/2022 32.5  31.4 - 35.0 g/dL Final    RDW 09/19/2022 13.5  11.9 - 14.6 % Final    Platelets 59/15/9194 265  150 - 450 K/uL Final    MPV 09/19/2022 10.7  9.4 - 12.3 FL Final    nRBC 09/19/2022 0.00  0.0 - 0.2 K/uL Final    **Note: Absolute NRBC parameter is now reported with Hemogram**    Differential Type 09/19/2022 AUTOMATED    Final    Seg Neutrophils 09/19/2022 71  43 - 78 % Final    Lymphocytes 09/19/2022 18  13 - 44 % Final    Monocytes 09/19/2022 9  4.0 - 12.0 % Final    Eosinophils % 09/19/2022 1  0.5 - 7.8 % Final    Basophils 09/19/2022 1  0.0 - 2.0 % Final    Immature Granulocytes 09/19/2022 0  0.0 - 5.0 % Final    Segs Absolute 09/19/2022 5.8  1.7 - 8.2 K/UL Final    Absolute Lymph # 09/19/2022 1.5  0.5 - 4.6 K/UL Final    Absolute Mono # 09/19/2022 0.8  0.1 - 1.3 K/UL Final    Absolute Eos # 09/19/2022 0.1  0.0 - 0.8 K/UL Final    Basophils Absolute 09/19/2022 0.1  0.0 - 0.2 K/UL Final    Absolute Immature Granulocyte 09/19/2022 0.0  0.0 - 0.5 K/UL Final    Sodium 09/19/2022 137  136 - 145 mmol/L Final    Potassium 09/19/2022 4.0  3.5 - 5.1 mmol/L Final    Chloride 09/19/2022 106  101 - 110 mmol/L Final    CO2 09/19/2022 26  21 - 32 mmol/L Final    Anion Gap 09/19/2022 5  4 - 13 mmol/L Final    Glucose 09/19/2022 102 (A) 65 - 100 mg/dL Final    BUN 09/19/2022 18  8 - 23 MG/DL Final    Creatinine 09/19/2022 0.90  0.6 - 1.0 MG/DL Final    GFR  09/19/2022 >60  >60 ml/min/1.73m2 Final    GFR Non- 09/19/2022 >60  >60 ml/min/1.73m2 Final    Comment:      Estimated GFR is calculated using the Modification of Diet in Renal Disease (MDRD) Study equation, reported for both  Americans (GFRAA) and non- Americans (GFRNA), and normalized to 1.73m2 body surface area. The physician must decide which value applies to the patient. The MDRD study equation should only be used in individuals age 25 or older. It has not been validated for the following: pregnant women, patients with serious comorbid conditions,or on certain medications, or persons with extremes of body size, muscle mass, or nutritional status.       Calcium 09/19/2022 8.9  8.3 - 10.4 MG/DL Final    Total Bilirubin 09/19/2022 0.3  0.2 - 1.1 MG/DL Final    ALT 09/19/2022 21  12 - 65 U/L Final    AST 09/19/2022 16  15 - 37 U/L Final    Alk Phosphatase 09/19/2022 72  50 - 136 U/L Final    Total Protein 09/19/2022 6.7  6.3 - 8.2 g/dL Final    Albumin 09/19/2022 3.5  3.2 - 4.6 g/dL Final    Globulin 09/19/2022 3.2  2.3 - 3.5 g/dL Final    Albumin/Globulin Ratio 09/19/2022 1.1 (A) 1.2 - 3.5   Final    Ferritin 09/19/2022 500 (A) 8 - 388 NG/ML Final    Sed Rate, Automated 09/19/2022 19  0 - 30 mm/hr Final    CRP 09/19/2022 <0.3  0.0 - 0.9 mg/dL Final         Treatment Summary has been discussed and given to patient: n/a        -------------------------------------------------------------------------------------------------------------------  Please call our office at [phone] if you have any  of the following symptoms:   Fever of 100.5 or greater  Chills  Shortness of breath  Swelling or pain in one leg    After office hours an answering service is available and will contact a provider for emergencies or if you are experiencing any of the above symptoms. Patient has My Chart. My Chart log in information explained on the after visit summary printout at the [facility] [address] desk.     Jenkins Fothergill, MA

## 2022-09-19 NOTE — PROGRESS NOTES
[address], [address] [place] [address]  Phone: [phone]           9/19/2022  Kecia Hightower  1950  [id number]         Kecia Hightower is a 66year old  female who has returned to my clinic for a follow-up visit; she was initially referred to me by Dr. Medardo Garcia for evaluation of Anemia, her Myeloid Molecular Profile was unremarkable, she received a course of Injectafer in 10/2021, she does not want to undergo an EGD or a Colonoscopy at the present time. ALLERGIES:     Azithromycin. FAMILY HISTORY:    No hematologic disorders. SOCIAL HISTORY:   She is  and lives with her . She used to work in a [employer]. She denies ever using any tobacco products. PAST MEDICAL HISTORY:    Anxiety Disorder, Peripheral Vascular Disease, Atrial Fibrillation, s/p AICD, Hypertension, GERD, Hyperlipidemia, Shingles, Coronary Artery Disease, s/p PTCA, Congestive Heart Failure, Osteoarthritis and Diabetes Mellitus. ROS:  The patient complained of fatigue; all other systems negative. PHYSICAL EXAM:   The patient was alert, awake and oriented, no acute distress was noted. A left infra-clavicular AICD was noted. Oral examination revealed dentures, no mucosal lesions were seen. Lymph node examination did not reveal any adenopathy. Neck examination revealed a supple neck, no thyromegaly or masses were noted. Chest examination revealed normal vesicular breath sounds. Heart examination revealed S-1 and S-2 with a 2/6 systolic murmur. Abdominal examination revealed a non-tender abdomen, bowel sounds were positive, no organomegaly could be appreciated. Examination of the extremities did not reveal any tenderness or erythema. Examination of the skin did not reveal any lesions. Medical problems and test results were reviewed with the patient today. KPS:     80.         LABORATORY INVESTIGATIONS:  CBC showed a WBC count of 8.3, ANC was 5.8, Hemoglobin was 12.5 and Platelets were 671; her Ferritin level was pending. ASSESSMENT:   Iron Deficiency Anemia; Hyperlipidemia; chronic GERD. I spent a total of 42 minutes on the day of the visit, managing the care of this patient. PLAN:     She should continue taking Omeprazole and Lovastatin; at her next clinic visit I will re-check her CBC, CMP and Ferritin level.         Earnstine Severs MD  Hematology/BMT

## 2023-01-03 ENCOUNTER — OFFICE VISIT (OUTPATIENT)
Dept: UROLOGY | Age: 73
End: 2023-01-03
Payer: MEDICARE

## 2023-01-03 DIAGNOSIS — R31.29 MICROHEMATURIA: ICD-10-CM

## 2023-01-03 DIAGNOSIS — N39.41 URGE INCONTINENCE: Primary | ICD-10-CM

## 2023-01-03 DIAGNOSIS — N32.81 OVERACTIVE BLADDER: ICD-10-CM

## 2023-01-03 LAB
BILIRUBIN, URINE, POC: ABNORMAL
BLOOD URINE, POC: ABNORMAL
GLUCOSE URINE, POC: NEGATIVE
KETONES, URINE, POC: ABNORMAL
LEUKOCYTE ESTERASE, URINE, POC: NEGATIVE
NITRITE, URINE, POC: NEGATIVE
PH, URINE, POC: 5.5 (ref 4.6–8)
PROTEIN,URINE, POC: 30
SPECIFIC GRAVITY, URINE, POC: 1.03 (ref 1–1.03)
URINALYSIS CLARITY, POC: ABNORMAL
URINALYSIS COLOR, POC: ABNORMAL
UROBILINOGEN, POC: ABNORMAL

## 2023-01-03 PROCEDURE — G8400 PT W/DXA NO RESULTS DOC: HCPCS | Performed by: NURSE PRACTITIONER

## 2023-01-03 PROCEDURE — 99214 OFFICE O/P EST MOD 30 MIN: CPT | Performed by: NURSE PRACTITIONER

## 2023-01-03 PROCEDURE — G8484 FLU IMMUNIZE NO ADMIN: HCPCS | Performed by: NURSE PRACTITIONER

## 2023-01-03 PROCEDURE — 1123F ACP DISCUSS/DSCN MKR DOCD: CPT | Performed by: NURSE PRACTITIONER

## 2023-01-03 PROCEDURE — 0509F URINE INCON PLAN DOCD: CPT | Performed by: NURSE PRACTITIONER

## 2023-01-03 PROCEDURE — G8417 CALC BMI ABV UP PARAM F/U: HCPCS | Performed by: NURSE PRACTITIONER

## 2023-01-03 PROCEDURE — 1036F TOBACCO NON-USER: CPT | Performed by: NURSE PRACTITIONER

## 2023-01-03 PROCEDURE — 81003 URINALYSIS AUTO W/O SCOPE: CPT | Performed by: NURSE PRACTITIONER

## 2023-01-03 PROCEDURE — 3017F COLORECTAL CA SCREEN DOC REV: CPT | Performed by: NURSE PRACTITIONER

## 2023-01-03 PROCEDURE — G8427 DOCREV CUR MEDS BY ELIG CLIN: HCPCS | Performed by: NURSE PRACTITIONER

## 2023-01-03 PROCEDURE — 1090F PRES/ABSN URINE INCON ASSESS: CPT | Performed by: NURSE PRACTITIONER
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
## 2023-01-05 LAB
BACTERIA SPEC CULT: NORMAL
BACTERIA SPEC CULT: NORMAL
SERVICE CMNT-IMP: NORMAL

## 2023-01-05 RX ORDER — NITROFURANTOIN 25; 75 MG/1; MG/1
100 CAPSULE ORAL 2 TIMES DAILY
Qty: 14 CAPSULE | Refills: 0 | Status: SHIPPED | OUTPATIENT
Start: 2023-01-05 | End: 2023-01-12

## 2023-01-09 NOTE — TELEPHONE ENCOUNTER
Patient called stating she was in [facility] over the weekend for congestive heart failure. Patient states the doctors there want to make changes in her medications. The Rx in question are as follows:    carvedilol (COREG) 12.5 MG tablet    lisinopril (PRINIVIL;ZESTRIL) 10 MG tablet to  5 mg    Add Prednisone    Patient states she doesn't want to make any changes without Dr Gauthier Schools opinion. Patient has an upcoming appt on 1/13. Please call and advise.

## 2023-01-10 ENCOUNTER — TELEPHONE (OUTPATIENT)
Dept: CARDIOLOGY CLINIC | Age: 73
End: 2023-01-10

## 2023-01-10 RX ORDER — DOFETILIDE 0.25 MG/1
250 CAPSULE ORAL 2 TIMES DAILY
Qty: 180 CAPSULE | Refills: 3 | Status: SHIPPED | OUTPATIENT
Start: 2023-01-10

## 2023-01-10 NOTE — TELEPHONE ENCOUNTER
Called pt and informed her that Dr. Tessy Garcia stated that he was okay with those changes. Pt stated that she needed a refill on her Tikosyn.  Informed her that I would forward that to Dr. Tessy Garcia to sign

## 2023-01-10 NOTE — TELEPHONE ENCOUNTER
MEDICATION REFILL REQUEST      Name of Medication:  Dofetilide  Dose:  250 mg  Frequency:  1 a day  Quantity:  ?  Days' supply:  ?       Pharmacy Name/Location:  call pt ,she did not leave pharmacy info

## 2023-01-13 ENCOUNTER — OFFICE VISIT (OUTPATIENT)
Dept: CARDIOLOGY CLINIC | Age: 73
End: 2023-01-13

## 2023-01-13 VITALS
SYSTOLIC BLOOD PRESSURE: 136 MMHG | HEART RATE: 73 BPM | HEIGHT: 61 IN | BODY MASS INDEX: 31.53 KG/M2 | WEIGHT: 167 LBS | DIASTOLIC BLOOD PRESSURE: 81 MMHG

## 2023-01-13 DIAGNOSIS — Z95.810 ICD (IMPLANTABLE CARDIOVERTER-DEFIBRILLATOR), BIVENTRICULAR, IN SITU: ICD-10-CM

## 2023-01-13 DIAGNOSIS — I10 PRIMARY HYPERTENSION: ICD-10-CM

## 2023-01-13 DIAGNOSIS — I48.19 PERSISTENT ATRIAL FIBRILLATION (HCC): Primary | ICD-10-CM

## 2023-01-13 DIAGNOSIS — I50.31 ACUTE HEART FAILURE WITH PRESERVED EJECTION FRACTION (HCC): ICD-10-CM

## 2023-01-13 RX ORDER — PREDNISONE 1 MG/1
5 TABLET ORAL DAILY
COMMUNITY

## 2023-01-13 RX ORDER — WARFARIN SODIUM 5 MG/1
5 TABLET ORAL
COMMUNITY

## 2023-01-13 RX ORDER — ROSUVASTATIN CALCIUM 40 MG/1
TABLET, COATED ORAL
COMMUNITY
Start: 2022-11-10

## 2023-01-13 ASSESSMENT — ENCOUNTER SYMPTOMS
DIARRHEA: 0
BLURRED VISION: 0
ABDOMINAL PAIN: 0
HOARSE VOICE: 0
HEMATOCHEZIA: 0
WHEEZING: 0
SHORTNESS OF BREATH: 1
ORTHOPNEA: 0
SPUTUM PRODUCTION: 0
HEMATEMESIS: 0
COLOR CHANGE: 0
BOWEL INCONTINENCE: 0
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
## 2023-02-07 DIAGNOSIS — I50.22 CHRONIC SYSTOLIC HEART FAILURE (HCC): ICD-10-CM

## 2023-02-07 DIAGNOSIS — Z95.810 ICD (IMPLANTABLE CARDIOVERTER-DEFIBRILLATOR), BIVENTRICULAR, IN SITU: Primary | ICD-10-CM

## 2023-02-07 DIAGNOSIS — I47.29 NSVT (NONSUSTAINED VENTRICULAR TACHYCARDIA): ICD-10-CM

## 2023-02-07 DIAGNOSIS — Z95.810 ICD (IMPLANTABLE CARDIOVERTER-DEFIBRILLATOR), BIVENTRICULAR, IN SITU: ICD-10-CM

## 2023-02-07 DIAGNOSIS — I47.29 NSVT (NONSUSTAINED VENTRICULAR TACHYCARDIA) (HCC): ICD-10-CM

## 2023-03-06 RX ORDER — LISINOPRIL 10 MG/1
TABLET ORAL
Qty: 180 TABLET | Refills: 3 | Status: SHIPPED | OUTPATIENT
Start: 2023-03-06

## 2023-03-06 RX ORDER — FUROSEMIDE 40 MG/1
TABLET ORAL
Qty: 90 TABLET | Refills: 3 | Status: SHIPPED | OUTPATIENT
Start: 2023-03-06

## 2023-03-16 ENCOUNTER — OFFICE VISIT (OUTPATIENT)
Dept: UROLOGY | Age: 73
End: 2023-03-16

## 2023-03-16 DIAGNOSIS — N39.41 URGE INCONTINENCE: Primary | ICD-10-CM

## 2023-03-16 DIAGNOSIS — N30.10 INTERSTITIAL CYSTITIS: ICD-10-CM

## 2023-03-16 DIAGNOSIS — N32.81 OVERACTIVE BLADDER: ICD-10-CM

## 2023-03-16 DIAGNOSIS — R31.29 MICROHEMATURIA: ICD-10-CM

## 2023-03-16 LAB
BILIRUBIN, URINE, POC: NEGATIVE
BLOOD URINE, POC: NORMAL
GLUCOSE URINE, POC: NEGATIVE
KETONES, URINE, POC: NEGATIVE
LEUKOCYTE ESTERASE, URINE, POC: NEGATIVE
NITRITE, URINE, POC: NEGATIVE
PH, URINE, POC: 7.5 (ref 4.6–8)
PROTEIN,URINE, POC: NEGATIVE
SPECIFIC GRAVITY, URINE, POC: 1.01 (ref 1–1.03)
URINALYSIS CLARITY, POC: NORMAL
URINALYSIS COLOR, POC: NORMAL
UROBILINOGEN, POC: NORMAL

## 2023-03-16 RX ORDER — LISINOPRIL 5 MG/1
TABLET ORAL
COMMUNITY
Start: 2023-01-08

## 2023-03-16 RX ORDER — FLUOXETINE HYDROCHLORIDE 20 MG/1
CAPSULE ORAL
COMMUNITY
Start: 2023-03-09

## 2023-03-16 RX ORDER — PROMETHAZINE HYDROCHLORIDE 25 MG/1
25 TABLET ORAL EVERY 6 HOURS PRN
COMMUNITY
Start: 2023-01-14
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
## 2023-03-29 PROCEDURE — 93296 REM INTERROG EVL PM/IDS: CPT | Performed by: INTERNAL MEDICINE

## 2023-03-29 PROCEDURE — 93295 DEV INTERROG REMOTE 1/2/MLT: CPT | Performed by: INTERNAL MEDICINE

## 2023-05-04 DIAGNOSIS — D50.8 OTHER IRON DEFICIENCY ANEMIA: Primary | ICD-10-CM

## 2023-05-08 ENCOUNTER — OFFICE VISIT (OUTPATIENT)
Dept: ONCOLOGY | Age: 73
End: 2023-05-08
Payer: MEDICARE

## 2023-05-08 ENCOUNTER — HOSPITAL ENCOUNTER (OUTPATIENT)
Dept: LAB | Age: 73
Discharge: HOME | End: 2023-05-11
Payer: MEDICARE

## 2023-05-08 VITALS
DIASTOLIC BLOOD PRESSURE: 73 MMHG | TEMPERATURE: 98 F | HEIGHT: 61 IN | WEIGHT: 171.3 LBS | SYSTOLIC BLOOD PRESSURE: 120 MMHG | HEART RATE: 67 BPM | RESPIRATION RATE: 16 BRPM | OXYGEN SATURATION: 98 % | BODY MASS INDEX: 32.34 KG/M2

## 2023-05-08 DIAGNOSIS — K21.9 CHRONIC GERD: ICD-10-CM

## 2023-05-08 DIAGNOSIS — E78.49 OTHER HYPERLIPIDEMIA: ICD-10-CM

## 2023-05-08 DIAGNOSIS — D50.8 OTHER IRON DEFICIENCY ANEMIA: ICD-10-CM

## 2023-05-08 DIAGNOSIS — D50.8 OTHER IRON DEFICIENCY ANEMIA: Primary | ICD-10-CM

## 2023-05-08 LAB
ALBUMIN SERPL-MCNC: 3.3 G/DL (ref 3.2–4.6)
ALBUMIN/GLOB SERPL: 1 (ref 0.4–1.6)
ALP SERPL-CCNC: 70 U/L (ref 50–136)
ALT SERPL-CCNC: 28 U/L (ref 12–65)
ANION GAP SERPL CALC-SCNC: 3 MMOL/L (ref 2–11)
AST SERPL-CCNC: 26 U/L (ref 15–37)
BASOPHILS # BLD: 0 K/UL (ref 0–0.2)
BASOPHILS NFR BLD: 1 % (ref 0–2)
BILIRUB SERPL-MCNC: 0.4 MG/DL (ref 0.2–1.1)
BUN SERPL-MCNC: 18 MG/DL (ref 8–23)
CALCIUM SERPL-MCNC: 8.7 MG/DL (ref 8.3–10.4)
CHLORIDE SERPL-SCNC: 110 MMOL/L (ref 101–110)
CO2 SERPL-SCNC: 27 MMOL/L (ref 21–32)
CREAT SERPL-MCNC: 0.8 MG/DL (ref 0.6–1)
DIFFERENTIAL METHOD BLD: NORMAL
EOSINOPHIL # BLD: 0.1 K/UL (ref 0–0.8)
EOSINOPHIL NFR BLD: 1 % (ref 0.5–7.8)
ERYTHROCYTE [DISTWIDTH] IN BLOOD BY AUTOMATED COUNT: 13.7 % (ref 11.9–14.6)
FERRITIN SERPL-MCNC: 314 NG/ML (ref 8–388)
GLOBULIN SER CALC-MCNC: 3.2 G/DL (ref 2.8–4.5)
GLUCOSE SERPL-MCNC: 92 MG/DL (ref 65–100)
HCT VFR BLD AUTO: 37.9 % (ref 35.8–46.3)
HGB BLD-MCNC: 12.3 G/DL (ref 11.7–15.4)
IMM GRANULOCYTES # BLD AUTO: 0 K/UL (ref 0–0.5)
IMM GRANULOCYTES NFR BLD AUTO: 0 % (ref 0–5)
LYMPHOCYTES # BLD: 1.6 K/UL (ref 0.5–4.6)
LYMPHOCYTES NFR BLD: 24 % (ref 13–44)
MCH RBC QN AUTO: 30 PG (ref 26.1–32.9)
MCHC RBC AUTO-ENTMCNC: 32.5 G/DL (ref 31.4–35)
MCV RBC AUTO: 92.4 FL (ref 82–102)
MONOCYTES # BLD: 0.6 K/UL (ref 0.1–1.3)
MONOCYTES NFR BLD: 8 % (ref 4–12)
NEUTS SEG # BLD: 4.4 K/UL (ref 1.7–8.2)
NEUTS SEG NFR BLD: 65 % (ref 43–78)
NRBC # BLD: 0 K/UL (ref 0–0.2)
PLATELET # BLD AUTO: 200 K/UL (ref 150–450)
PMV BLD AUTO: 11.1 FL (ref 9.4–12.3)
POTASSIUM SERPL-SCNC: 3.8 MMOL/L (ref 3.5–5.1)
PROT SERPL-MCNC: 6.5 G/DL (ref 6.3–8.2)
RBC # BLD AUTO: 4.1 M/UL (ref 4.05–5.2)
SODIUM SERPL-SCNC: 140 MMOL/L (ref 133–143)
WBC # BLD AUTO: 6.8 K/UL (ref 4.3–11.1)

## 2023-05-08 PROCEDURE — 3078F DIAST BP <80 MM HG: CPT | Performed by: INTERNAL MEDICINE

## 2023-05-08 PROCEDURE — 3017F COLORECTAL CA SCREEN DOC REV: CPT | Performed by: INTERNAL MEDICINE

## 2023-05-08 PROCEDURE — 1036F TOBACCO NON-USER: CPT | Performed by: INTERNAL MEDICINE

## 2023-05-08 PROCEDURE — 85025 COMPLETE CBC W/AUTO DIFF WBC: CPT

## 2023-05-08 PROCEDURE — 1123F ACP DISCUSS/DSCN MKR DOCD: CPT | Performed by: INTERNAL MEDICINE

## 2023-05-08 PROCEDURE — G8417 CALC BMI ABV UP PARAM F/U: HCPCS | Performed by: INTERNAL MEDICINE

## 2023-05-08 PROCEDURE — 99215 OFFICE O/P EST HI 40 MIN: CPT | Performed by: INTERNAL MEDICINE

## 2023-05-08 PROCEDURE — G8427 DOCREV CUR MEDS BY ELIG CLIN: HCPCS | Performed by: INTERNAL MEDICINE

## 2023-05-08 PROCEDURE — 82728 ASSAY OF FERRITIN: CPT

## 2023-05-08 PROCEDURE — G8400 PT W/DXA NO RESULTS DOC: HCPCS | Performed by: INTERNAL MEDICINE

## 2023-05-08 PROCEDURE — 80053 COMPREHEN METABOLIC PANEL: CPT

## 2023-05-08 PROCEDURE — 3074F SYST BP LT 130 MM HG: CPT | Performed by: INTERNAL MEDICINE

## 2023-05-08 PROCEDURE — 36415 COLL VENOUS BLD VENIPUNCTURE: CPT

## 2023-05-08 PROCEDURE — 1090F PRES/ABSN URINE INCON ASSESS: CPT | Performed by: INTERNAL MEDICINE

## 2023-05-08 ASSESSMENT — PATIENT HEALTH QUESTIONNAIRE - PHQ9
SUM OF ALL RESPONSES TO PHQ QUESTIONS 1-9: 0
SUM OF ALL RESPONSES TO PHQ QUESTIONS 1-9: 0
2. FEELING DOWN, DEPRESSED OR HOPELESS: 0
SUM OF ALL RESPONSES TO PHQ QUESTIONS 1-9: 0
SUM OF ALL RESPONSES TO PHQ QUESTIONS 1-9: 0

## 2023-05-08 NOTE — PROGRESS NOTES
[address]  Phone: [phone]           5/8/2023  Hipolito Zhu  1950  [id number]         Hipolito Zhu is a 67year old  female who has returned to my clinic for a follow-up visit; she was initially referred to me by Dr. Enrique Roberts for evaluation of Anemia, her Myeloid Molecular Profile was unremarkable, she received parenteral Iron infusions in 10/2021, she does not want to undergo an EGD or a Colonoscopy at the present time. ALLERGIES:     Azithromycin. FAMILY HISTORY:    No hematologic disorders. SOCIAL HISTORY:   She is  and lives with her . She used to work in a [employer]. She denies ever using any tobacco products. PAST MEDICAL HISTORY:    Anxiety Disorder, Peripheral Vascular Disease, Atrial Fibrillation, s/p AICD, Hypertension, GERD, Hyperlipidemia, Shingles, Coronary Artery Disease, s/p PTCA, Congestive Heart Failure, Osteoarthritis and Diabetes Mellitus. ROS:  The patient complained of fatigue; all other systems negative. PHYSICAL EXAM:   The patient was alert, awake and oriented, no acute distress was noted. A left infra-clavicular AICD was noted. Oral examination revealed dentures, no mucosal lesions were seen. Lymph node examination did not reveal any adenopathy. Neck examination revealed a supple neck, no thyromegaly or masses were noted. Chest examination revealed normal vesicular breath sounds. Heart examination revealed S-1 and S-2 with a 2/6 systolic murmur. Abdominal examination revealed a non-tender abdomen, bowel sounds were positive, no organomegaly could be appreciated. Examination of the extremities did not reveal any tenderness or erythema. Examination of the skin did not reveal any lesions. Medical problems and test results were reviewed with the patient today. KPS:     80.         LABORATORY INVESTIGATIONS:  CBC showed a WBC

## 2023-05-08 NOTE — PATIENT INSTRUCTIONS
Patient Instructions from Today's Visit    Reason for Visit:  Follow Up    Diagnosis Information:  [email or web address]    Plan:  Lab work looks stable today  We will start seeing you on an annual basis    Follow Up: We will bring you back in May 2024 for a follow up with  and lab work prior.     Recent Lab Results:  Hospital Outpatient Visit on 05/08/2023   Component Date Value Ref Range Status    WBC 05/08/2023 6.8  4.3 - 11.1 K/uL Final    RBC 05/08/2023 4.10  4.05 - 5.2 M/uL Final    Hemoglobin 05/08/2023 12.3  11.7 - 15.4 g/dL Final    Hematocrit 05/08/2023 37.9  35.8 - 46.3 % Final    MCV 05/08/2023 92.4  82.0 - 102.0 FL Final    MCH 05/08/2023 30.0  26.1 - 32.9 PG Final    MCHC 05/08/2023 32.5  31.4 - 35.0 g/dL Final    RDW 05/08/2023 13.7  11.9 - 14.6 % Final    Platelets 77/06/3272 200  150 - 450 K/uL Final    MPV 05/08/2023 11.1  9.4 - 12.3 FL Final    nRBC 05/08/2023 0.00  0.0 - 0.2 K/uL Final    **Note: Absolute NRBC parameter is now reported with Hemogram**    Differential Type 05/08/2023 AUTOMATED    Final    Seg Neutrophils 05/08/2023 65  43 - 78 % Final    Lymphocytes 05/08/2023 24  13 - 44 % Final    Monocytes 05/08/2023 8  4.0 - 12.0 % Final    Eosinophils % 05/08/2023 1  0.5 - 7.8 % Final    Basophils 05/08/2023 1  0.0 - 2.0 % Final    Immature Granulocytes 05/08/2023 0  0.0 - 5.0 % Final    Segs Absolute 05/08/2023 4.4  1.7 - 8.2 K/UL Final    Absolute Lymph # 05/08/2023 1.6  0.5 - 4.6 K/UL Final    Absolute Mono # 05/08/2023 0.6  0.1 - 1.3 K/UL Final    Absolute Eos # 05/08/2023 0.1  0.0 - 0.8 K/UL Final    Basophils Absolute 05/08/2023 0.0  0.0 - 0.2 K/UL Final    Absolute Immature Granulocyte 05/08/2023 0.0  0.0 - 0.5 K/UL Final    Sodium 05/08/2023 140  133 - 143 mmol/L Final    Potassium 05/08/2023 3.8  3.5 - 5.1 mmol/L Final    Chloride 05/08/2023 110  101 - 110 mmol/L Final    CO2 05/08/2023 27  21 - 32

## 2023-05-18 DIAGNOSIS — I47.29 NSVT (NONSUSTAINED VENTRICULAR TACHYCARDIA) (HCC): ICD-10-CM

## 2023-05-18 DIAGNOSIS — I50.22 CHRONIC SYSTOLIC HEART FAILURE (HCC): ICD-10-CM

## 2023-05-18 DIAGNOSIS — Z95.810 ICD (IMPLANTABLE CARDIOVERTER-DEFIBRILLATOR), BIVENTRICULAR, IN SITU: ICD-10-CM

## 2023-06-20 ENCOUNTER — TELEPHONE (OUTPATIENT)
Age: 73
End: 2023-06-20

## 2023-06-20 NOTE — TELEPHONE ENCOUNTER
Gaby Matthews, pt's EC, called and states pt dc'd from [facility] today. Had syncopal episode, broke her tibia and fibula with surgical repair, scalp hematoma. CT negative for intracranial hemorrhage. ICD interrogation showed no evidence for rhythm problems leading up to syncopal event. Was on lasix despite poor po intake. Per Gaby Matthews, Lasix and BP medications were stopped on admission. BP stable at present. Pt has first available [facility] office f/u appt 7/19/23. Asking if they should restart lasix. Triage will review with Dr. Birdie Vergara and call Gaby Matthews back with his response.

## 2023-06-20 NOTE — TELEPHONE ENCOUNTER
Triage informed Dr. Tarik Haynes. Per Dr. Tarik Haynes, remain off of lasix at this time. Keep f/u appt. Triage informed Barrington Gowers of Dr. Andreea Morales response. She verbalizes understanding and agrees to plan.

## 2023-06-20 NOTE — TELEPHONE ENCOUNTER
This patient was in the hospital at [facility] and they stated she needed to get in touch with Dr Cristian Noriega about lowering her lasix dosage. Please advise.

## 2023-06-29 PROCEDURE — 93295 DEV INTERROG REMOTE 1/2/MLT: CPT | Performed by: INTERNAL MEDICINE

## 2023-06-29 PROCEDURE — 93296 REM INTERROG EVL PM/IDS: CPT | Performed by: INTERNAL MEDICINE

## 2023-07-19 ENCOUNTER — OFFICE VISIT (OUTPATIENT)
Age: 73
End: 2023-07-19
Payer: MEDICARE

## 2023-07-19 VITALS
BODY MASS INDEX: 32.37 KG/M2 | DIASTOLIC BLOOD PRESSURE: 68 MMHG | HEART RATE: 74 BPM | HEIGHT: 61 IN | SYSTOLIC BLOOD PRESSURE: 128 MMHG

## 2023-07-19 DIAGNOSIS — I47.29 NSVT (NONSUSTAINED VENTRICULAR TACHYCARDIA) (HCC): ICD-10-CM

## 2023-07-19 DIAGNOSIS — I10 PRIMARY HYPERTENSION: ICD-10-CM

## 2023-07-19 DIAGNOSIS — I48.0 PAF (PAROXYSMAL ATRIAL FIBRILLATION) (HCC): ICD-10-CM

## 2023-07-19 DIAGNOSIS — Z95.810 BIVENTRICULAR IMPLANTABLE CARDIOVERTER-DEFIBRILLATOR IN SITU: Primary | ICD-10-CM

## 2023-07-19 PROCEDURE — G8400 PT W/DXA NO RESULTS DOC: HCPCS | Performed by: INTERNAL MEDICINE

## 2023-07-19 PROCEDURE — 1090F PRES/ABSN URINE INCON ASSESS: CPT | Performed by: INTERNAL MEDICINE

## 2023-07-19 PROCEDURE — 3074F SYST BP LT 130 MM HG: CPT | Performed by: INTERNAL MEDICINE

## 2023-07-19 PROCEDURE — 1123F ACP DISCUSS/DSCN MKR DOCD: CPT | Performed by: INTERNAL MEDICINE

## 2023-07-19 PROCEDURE — G8427 DOCREV CUR MEDS BY ELIG CLIN: HCPCS | Performed by: INTERNAL MEDICINE

## 2023-07-19 PROCEDURE — 3017F COLORECTAL CA SCREEN DOC REV: CPT | Performed by: INTERNAL MEDICINE

## 2023-07-19 PROCEDURE — 99214 OFFICE O/P EST MOD 30 MIN: CPT | Performed by: INTERNAL MEDICINE

## 2023-07-19 PROCEDURE — 1036F TOBACCO NON-USER: CPT | Performed by: INTERNAL MEDICINE

## 2023-07-19 PROCEDURE — 3078F DIAST BP <80 MM HG: CPT | Performed by: INTERNAL MEDICINE

## 2023-07-19 PROCEDURE — G8417 CALC BMI ABV UP PARAM F/U: HCPCS | Performed by: INTERNAL MEDICINE

## 2023-07-19 ASSESSMENT — ENCOUNTER SYMPTOMS
BOWEL INCONTINENCE: 0
WHEEZING: 0
HOARSE VOICE: 0
SHORTNESS OF BREATH: 0
HEMATEMESIS: 0
DIARRHEA: 0
ORTHOPNEA: 0
HEMATOCHEZIA: 0
SPUTUM PRODUCTION: 0
COLOR CHANGE: 0
ABDOMINAL PAIN: 0
BLURRED VISION: 0

## 2023-07-19 NOTE — PROGRESS NOTES
[facility] CARDIOLOGY  [address] [place] [address]  PHONE: [phone]        23        NAME:  Christie Valladares  : 1950  MRN: [id number]       SUBJECTIVE:   Christie Valladares is a 67 y.o. female seen for a follow up visit regarding the following: The patient has a hx of AF with ablation,ICD,NSVT,HFpEF,and primary hypertension. Since her last visit,she reports a fall with right leg fx. Chief Complaint   Patient presents with    Atrial Fibrillation       HPI:    Congestive Heart Failure  Presents for follow-up visit. Pertinent negatives include no abdominal pain, chest pain, chest pressure, claudication, edema, fatigue, muscle weakness, near-syncope, nocturia, orthopnea, palpitations, paroxysmal nocturnal dyspnea, shortness of breath or unexpected weight change. The symptoms have been stable. Past Medical History, Past Surgical History, Family history, Social History, and Medications were all reviewed with the patient today and updated as necessary.          Current Outpatient Medications:     FLUoxetine (PROZAC) 20 MG capsule, , Disp: , Rfl:     promethazine (PHENERGAN) 25 MG tablet, Take 1 tablet by mouth every 6 hours as needed, Disp: , Rfl:     lisinopril (PRINIVIL;ZESTRIL) 10 MG tablet, TAKE 1 TABLET TWICE DAILY (Patient taking differently: Take 1 tablet by mouth daily), Disp: 180 tablet, Rfl: 3    rosuvastatin (CRESTOR) 40 MG tablet, , Disp: , Rfl:     predniSONE (DELTASONE) 5 MG tablet, Take 2 tablets by mouth daily, Disp: , Rfl:     warfarin (COUMADIN) 5 MG tablet, Take 1 tablet by mouth As directed, Disp: , Rfl:     dofetilide (TIKOSYN) 250 MCG capsule, Take 1 capsule by mouth 2 times daily, Disp: 180 capsule, Rfl: 3    calcium carbonate 600 MG TABS tablet, Take by mouth daily, Disp: , Rfl:     carvedilol (COREG) 12.5 MG tablet, Take 1 tablet by mouth 2 times daily, Disp: 180 tablet, Rfl: 3    vitamin D3 (CHOLECALCIFEROL) 125 MCG (5000 UT) TABS tablet,

## 2023-09-18 ENCOUNTER — OFFICE VISIT (OUTPATIENT)
Dept: UROLOGY | Age: 73
End: 2023-09-18
Payer: MEDICARE

## 2023-09-18 DIAGNOSIS — N39.41 URGE INCONTINENCE: Primary | ICD-10-CM

## 2023-09-18 DIAGNOSIS — R31.29 MICROHEMATURIA: ICD-10-CM

## 2023-09-18 DIAGNOSIS — N30.10 INTERSTITIAL CYSTITIS: ICD-10-CM

## 2023-09-18 DIAGNOSIS — N32.81 OVERACTIVE BLADDER: ICD-10-CM

## 2023-09-18 PROCEDURE — 1123F ACP DISCUSS/DSCN MKR DOCD: CPT | Performed by: NURSE PRACTITIONER

## 2023-09-18 PROCEDURE — 3017F COLORECTAL CA SCREEN DOC REV: CPT | Performed by: NURSE PRACTITIONER

## 2023-09-18 PROCEDURE — 1036F TOBACCO NON-USER: CPT | Performed by: NURSE PRACTITIONER

## 2023-09-18 PROCEDURE — 99214 OFFICE O/P EST MOD 30 MIN: CPT | Performed by: NURSE PRACTITIONER

## 2023-09-18 PROCEDURE — G8417 CALC BMI ABV UP PARAM F/U: HCPCS | Performed by: NURSE PRACTITIONER

## 2023-09-18 PROCEDURE — G8400 PT W/DXA NO RESULTS DOC: HCPCS | Performed by: NURSE PRACTITIONER

## 2023-09-18 PROCEDURE — 0509F URINE INCON PLAN DOCD: CPT | Performed by: NURSE PRACTITIONER

## 2023-09-18 PROCEDURE — 1090F PRES/ABSN URINE INCON ASSESS: CPT | Performed by: NURSE PRACTITIONER

## 2023-09-18 PROCEDURE — 81003 URINALYSIS AUTO W/O SCOPE: CPT | Performed by: NURSE PRACTITIONER

## 2023-09-18 PROCEDURE — G8427 DOCREV CUR MEDS BY ELIG CLIN: HCPCS | Performed by: NURSE PRACTITIONER

## 2023-09-18 NOTE — PROGRESS NOTES
biventricular, in situ     Dual-chamber ICD in [facility] 2011; RA/RV lead revision/upgrade to BiV 3/2013    NSVT (nonsustained ventricular tachycardia) ([address]) 5/24/2018    Obesity     BMI 33    Osteoarthritis     current    Persistent atrial fibrillation ([address]) 5/24/2018    coumadin and tikosyn    Psychiatric disorder     anxiety    PVD (peripheral vascular disease) ([address]) 11/3/2015    Syncope and collapse     Vertigo      Past Surgical History:   Procedure Laterality Date    BREAST BIOPSY  2020    CARDIAC CATHETERIZATION  08/20/2018    LV EF=55%. Patent coronary arteries with no significant stenosis    CARDIAC DEFIBRILLATOR PLACEMENT      CARDIAC ELECTROPHYSIOLOGY STUDY AND ABLATION      CARPAL TUNNEL RELEASE  2000    CATARACT REMOVAL Bilateral 2017    CHOLECYSTECTOMY  2006    HYSTERECTOMY (CERVIX STATUS UNKNOWN)  2005    NERVE SURGERY N/A 9/1/2022    STAGE 1 OF 2 COMBINED INTERSTIM PLACEMENT performed by Whitney Luna MD at [address]      100% dependent    PACEMAKER PLACEMENT      TONSILLECTOMY      as a child    [address]     Current Outpatient Medications   Medication Sig Dispense Refill    FLUoxetine (PROZAC) 20 MG capsule       promethazine (PHENERGAN) 25 MG tablet Take 1 tablet by mouth every 6 hours as needed      lisinopril (PRINIVIL;ZESTRIL) 10 MG tablet TAKE 1 TABLET TWICE DAILY (Patient taking differently: Take 1 tablet by mouth daily) 180 tablet 3    furosemide (LASIX) 40 MG tablet TAKE 1 TABLET EVERY DAY 90 tablet 3    rosuvastatin (CRESTOR) 40 MG tablet       predniSONE (DELTASONE) 5 MG tablet Take 2 tablets by mouth daily      warfarin (COUMADIN) 5 MG tablet Take 1 tablet by mouth As directed      dofetilide (TIKOSYN) 250 MCG capsule Take 1 capsule by mouth 2 times daily 180 capsule 3    calcium carbonate 600 MG TABS tablet Take by mouth daily      carvedilol (COREG) 12.5 MG tablet Take 1 tablet by mouth 2 times daily 180 tablet 3    vitamin D3 (CHOLECALCIFEROL) 125 MCG (5000

## 2024-01-03 PROCEDURE — 93296 REM INTERROG EVL PM/IDS: CPT | Performed by: INTERNAL MEDICINE

## 2024-01-03 PROCEDURE — 93295 DEV INTERROG REMOTE 1/2/MLT: CPT | Performed by: INTERNAL MEDICINE

## 2024-01-04 RX ORDER — DOFETILIDE 0.25 MG/1
250 CAPSULE ORAL 2 TIMES DAILY
Qty: 180 CAPSULE | Refills: 3 | Status: SHIPPED | OUTPATIENT
Start: 2024-01-04

## 2024-01-19 ENCOUNTER — OFFICE VISIT (OUTPATIENT)
Age: 74
End: 2024-01-19

## 2024-01-19 VITALS
BODY MASS INDEX: 28.28 KG/M2 | SYSTOLIC BLOOD PRESSURE: 114 MMHG | HEART RATE: 84 BPM | HEIGHT: 66 IN | DIASTOLIC BLOOD PRESSURE: 64 MMHG | WEIGHT: 176 LBS

## 2024-01-19 DIAGNOSIS — I48.0 PAF (PAROXYSMAL ATRIAL FIBRILLATION) (HCC): ICD-10-CM

## 2024-01-19 DIAGNOSIS — I10 PRIMARY HYPERTENSION: ICD-10-CM

## 2024-01-19 DIAGNOSIS — Z95.810 ICD (IMPLANTABLE CARDIOVERTER-DEFIBRILLATOR), BIVENTRICULAR, IN SITU: Primary | ICD-10-CM

## 2024-01-19 DIAGNOSIS — I47.29 NSVT (NONSUSTAINED VENTRICULAR TACHYCARDIA) (HCC): ICD-10-CM

## 2024-01-19 ASSESSMENT — ENCOUNTER SYMPTOMS
DIARRHEA: 0
HEMATOCHEZIA: 0
HEMATEMESIS: 0
ABDOMINAL PAIN: 0
BOWEL INCONTINENCE: 0
SHORTNESS OF BREATH: 0
ORTHOPNEA: 0
WHEEZING: 0
HOARSE VOICE: 0
COLOR CHANGE: 0
BLURRED VISION: 0
SPUTUM PRODUCTION: 0

## 2024-01-19 NOTE — PROGRESS NOTES
[facility] CARDIOLOGY  [address] [place] [address], [facility] [address]  [place] [address]  PHONE: [phone]        24        NAME:  Christi Garibay  : 1950  MRN: [id number]       SUBJECTIVE:   Christi Garibay is a 73 y.o. female seen for a follow up visit regarding the following: The patient has a hx of AF ablation with ICD,NSVT,HFpEF,and primary hypertension.She returns for scheduled follow up.Overall,she reports doing well.    Chief Complaint   Patient presents with    Atrial Fibrillation       HPI:    Atrial Fibrillation  Presents for follow-up visit. Symptoms are negative for an AICD problem, bradycardia, chest pain, dizziness, hemodynamic instability, hypertension, hypotension, pacemaker problem, palpitations, shortness of breath, syncope, tachycardia and weakness. The symptoms have been stable.       Past Medical History, Past Surgical History, Family history, Social History, and Medications were all reviewed with the patient today and updated as necessary.         Current Outpatient Medications:     dofetilide (TIKOSYN) 250 MCG capsule, Take 1 capsule by mouth 2 times daily, Disp: 180 capsule, Rfl: 3    FLUoxetine (PROZAC) 20 MG capsule, , Disp: , Rfl:     promethazine (PHENERGAN) 25 MG tablet, Take 1 tablet by mouth every 6 hours as needed, Disp: , Rfl:     lisinopril (PRINIVIL;ZESTRIL) 10 MG tablet, TAKE 1 TABLET TWICE DAILY (Patient taking differently: Take 1 tablet by mouth daily), Disp: 180 tablet, Rfl: 3    furosemide (LASIX) 40 MG tablet, TAKE 1 TABLET EVERY DAY, Disp: 90 tablet, Rfl: 3    rosuvastatin (CRESTOR) 40 MG tablet, , Disp: , Rfl:     predniSONE (DELTASONE) 5 MG tablet, Take 2 tablets by mouth daily, Disp: , Rfl:     warfarin (COUMADIN) 5 MG tablet, Take 1 tablet by mouth As directed, Disp: , Rfl:     calcium carbonate 600 MG TABS tablet, Take by mouth daily, Disp: , Rfl:     carvedilol (COREG) 12.5 MG tablet, Take 1 tablet by mouth 2 times daily, Disp: 180 tablet, Rfl: 3

## 2024-02-26 ENCOUNTER — TELEPHONE (OUTPATIENT)
Age: 74
End: 2024-02-26

## 2024-02-26 ENCOUNTER — PATIENT MESSAGE (OUTPATIENT)
Age: 74
End: 2024-02-26

## 2024-02-26 NOTE — TELEPHONE ENCOUNTER
Advised daughter, Alyson, of Dr. Hong's response. Alyson verbalized understanding. Alyson states she thinks patient is in CHF with weight gain of 5 lbs in 1 week, and very bad SOB with any exertion x 2 weeks.  Unable to do usual ADLS because of increased weakness and SOB. Per Tracy GROSSMAN, scheduled [facility] appointment with Dr. Hong on 2/28/24 at 1:30 pm at [facility]. Advised Alyson to take patient to [facility] ER for immediate evaluation of any persistent SOB or if she feels patient is in distress. Alyson, verbalized understanding.

## 2024-02-26 NOTE — TELEPHONE ENCOUNTER
EOL on pacer applies to her.Sokeep doing normal activity and follow up as scheduled.  Received: Today  El Hong MD Keener, Lynn F RN  Caller: Unspecified (Today, 11:03 AM)

## 2024-02-26 NOTE — TELEPHONE ENCOUNTER
----- Message from Marisa Cavanaugh MA sent at 2/26/2024 10:58 AM EST -----  Regarding: FW: Pacemaker life and increased sob  Contact: [phone]    ----- Message -----  From: Christi Garibay  Sent: 2/26/2024  10:47 AM EST  To: [facility] Cardiology Clinical Staff  Subject: Pacemaker life and increased sob                 Hello! This is Christi's daughter, Alyson.  I am sending this message because we have a few questions and need some guidance.    1.  Mom has had increased sob over the past few weeks.  To the point if she makes her bed or walks into the kitchen she is sob.  She told me that she has been taking her lasix dose 1x weekly.  She has gained about 5lbs in the past week.  I advised her to take her lasix today and just need some guidance on this.   2. She called earlier and had them do a pacemaker check, they advised her that everything looked good.  They stated that she has 8months left on battery life.  The question is we have always been told that whatever is left, since she is 100% dependant on her pacemaker, cut that time in half.  This would mean she has 4months right? With that being said, she is havig very high levels of anxiety concerned about this going out and if she is in CHF again could this make it work even harder?  We just want some clarification on this. She is scheduled to go to her grandsons graduation in May and if thats the case she is so worried that it will stop while she is there.  Can you please help us understand this better?    Thank you for your time,  Alyson Shaffer  [phone]

## 2024-02-28 ENCOUNTER — HOSPITAL ENCOUNTER (OUTPATIENT)
Dept: GENERAL RADIOLOGY | Age: 74
Discharge: HOME | End: 2024-03-02

## 2024-02-28 ENCOUNTER — OFFICE VISIT (OUTPATIENT)
Age: 74
End: 2024-02-28
Payer: MEDICARE

## 2024-02-28 VITALS
DIASTOLIC BLOOD PRESSURE: 74 MMHG | BODY MASS INDEX: 28.7 KG/M2 | SYSTOLIC BLOOD PRESSURE: 120 MMHG | HEIGHT: 66 IN | WEIGHT: 178.6 LBS | HEART RATE: 69 BPM

## 2024-02-28 DIAGNOSIS — I50.22 CHRONIC SYSTOLIC HEART FAILURE (HCC): ICD-10-CM

## 2024-02-28 DIAGNOSIS — I47.29 NSVT (NONSUSTAINED VENTRICULAR TACHYCARDIA) (HCC): ICD-10-CM

## 2024-02-28 DIAGNOSIS — R06.02 SOB (SHORTNESS OF BREATH): ICD-10-CM

## 2024-02-28 DIAGNOSIS — Z95.810 ICD (IMPLANTABLE CARDIOVERTER-DEFIBRILLATOR), BIVENTRICULAR, IN SITU: ICD-10-CM

## 2024-02-28 DIAGNOSIS — I50.31 ACUTE HEART FAILURE WITH PRESERVED EJECTION FRACTION (HCC): ICD-10-CM

## 2024-02-28 DIAGNOSIS — I48.0 PAF (PAROXYSMAL ATRIAL FIBRILLATION) (HCC): Primary | ICD-10-CM

## 2024-02-28 LAB
ANION GAP SERPL CALC-SCNC: 4 MMOL/L (ref 2–11)
BUN SERPL-MCNC: 14 MG/DL (ref 8–23)
CALCIUM SERPL-MCNC: 9.1 MG/DL (ref 8.3–10.4)
CHLORIDE SERPL-SCNC: 104 MMOL/L (ref 103–113)
CO2 SERPL-SCNC: 29 MMOL/L (ref 21–32)
CREAT SERPL-MCNC: 0.8 MG/DL (ref 0.6–1)
ERYTHROCYTE [DISTWIDTH] IN BLOOD BY AUTOMATED COUNT: 13.8 % (ref 11.9–14.6)
GLUCOSE SERPL-MCNC: 97 MG/DL (ref 65–100)
HCT VFR BLD AUTO: 40.7 % (ref 35.8–46.3)
HGB BLD-MCNC: 13 G/DL (ref 11.7–15.4)
MCH RBC QN AUTO: 30.3 PG (ref 26.1–32.9)
MCHC RBC AUTO-ENTMCNC: 31.9 G/DL (ref 31.4–35)
MCV RBC AUTO: 94.9 FL (ref 82–102)
NRBC # BLD: 0 K/UL (ref 0–0.2)
NT PRO BNP: 245 PG/ML (ref 5–125)
PLATELET # BLD AUTO: 263 K/UL (ref 150–450)
PMV BLD AUTO: 11 FL (ref 9.4–12.3)
POTASSIUM SERPL-SCNC: 4.9 MMOL/L (ref 3.5–5.1)
RBC # BLD AUTO: 4.29 M/UL (ref 4.05–5.2)
SODIUM SERPL-SCNC: 137 MMOL/L (ref 136–146)
WBC # BLD AUTO: 7.8 K/UL (ref 4.3–11.1)

## 2024-02-28 PROCEDURE — G8427 DOCREV CUR MEDS BY ELIG CLIN: HCPCS | Performed by: INTERNAL MEDICINE

## 2024-02-28 PROCEDURE — 3074F SYST BP LT 130 MM HG: CPT | Performed by: INTERNAL MEDICINE

## 2024-02-28 PROCEDURE — 1036F TOBACCO NON-USER: CPT | Performed by: INTERNAL MEDICINE

## 2024-02-28 PROCEDURE — 3078F DIAST BP <80 MM HG: CPT | Performed by: INTERNAL MEDICINE

## 2024-02-28 PROCEDURE — 99214 OFFICE O/P EST MOD 30 MIN: CPT | Performed by: INTERNAL MEDICINE

## 2024-02-28 PROCEDURE — G8484 FLU IMMUNIZE NO ADMIN: HCPCS | Performed by: INTERNAL MEDICINE

## 2024-02-28 PROCEDURE — 93000 ELECTROCARDIOGRAM COMPLETE: CPT | Performed by: INTERNAL MEDICINE

## 2024-02-28 PROCEDURE — G8400 PT W/DXA NO RESULTS DOC: HCPCS | Performed by: INTERNAL MEDICINE

## 2024-02-28 PROCEDURE — G8417 CALC BMI ABV UP PARAM F/U: HCPCS | Performed by: INTERNAL MEDICINE

## 2024-02-28 PROCEDURE — 1090F PRES/ABSN URINE INCON ASSESS: CPT | Performed by: INTERNAL MEDICINE

## 2024-02-28 PROCEDURE — 1123F ACP DISCUSS/DSCN MKR DOCD: CPT | Performed by: INTERNAL MEDICINE

## 2024-02-28 PROCEDURE — 3017F COLORECTAL CA SCREEN DOC REV: CPT | Performed by: INTERNAL MEDICINE

## 2024-02-28 ASSESSMENT — ENCOUNTER SYMPTOMS
SORE THROAT: 0
HOARSE VOICE: 0
HEMATEMESIS: 0
VOMITING: 0
BOWEL INCONTINENCE: 0
BLURRED VISION: 0
HEMOPTYSIS: 0
ABDOMINAL PAIN: 0
SHORTNESS OF BREATH: 1
SPUTUM PRODUCTION: 0
COLOR CHANGE: 0
WHEEZING: 0
RHINORRHEA: 0
ORTHOPNEA: 0
SWOLLEN GLANDS: 0
DIARRHEA: 0
HEMATOCHEZIA: 0

## 2024-02-28 NOTE — PROGRESS NOTES
[facility] CARDIOLOGY  [address] [place] [address], [address]  [place] [address]  PHONE: [phone]        24        NAME:  Christi Garibay  : 1950  MRN: [id number]       SUBJECTIVE:   Christi Garibay is a 73 y.o. female seen for a follow up visit regarding the following: The patient has a hx of AF with ablation & ICD,NSVT,HFpEF,and primary hypertension.Recently,her daughter called reporting increased SOB & weight gain.She returns for follow up.  She complains of 3 week hx of worsening LUNDBERG and 5-6 lbs weigh gain.  Chief Complaint   Patient presents with    Shortness of Breath    Atrial Fibrillation    Hypertension    Cardiomyopathy    Congestive Heart Failure    Hyperlipidemia    Tachycardia       HPI:    Shortness of Breath  This is a chronic problem. The problem occurs daily. The problem has been gradually worsening (Worse over the past 3 weeks). Pertinent negatives include no abdominal pain, chest pain, claudication, coryza, ear pain, fever, headaches, hemoptysis, leg pain, leg swelling, neck pain, orthopnea, PND, rash, rhinorrhea, sore throat, sputum production, swollen glands, syncope, vomiting or wheezing. Nothing aggravates the symptoms. Treatments tried: Lasix prn. The treatment provided no relief.       Past Medical History, Past Surgical History, Family history, Social History, and Medications were all reviewed with the patient today and updated as necessary.         Current Outpatient Medications:     dofetilide (TIKOSYN) 250 MCG capsule, Take 1 capsule by mouth 2 times daily, Disp: 180 capsule, Rfl: 3    FLUoxetine (PROZAC) 20 MG capsule, Take 1 capsule by mouth daily, Disp: , Rfl:     promethazine (PHENERGAN) 25 MG tablet, Take 1 tablet by mouth every 6 hours as needed, Disp: , Rfl:     lisinopril (PRINIVIL;ZESTRIL) 10 MG tablet, TAKE 1 TABLET TWICE DAILY (Patient taking differently: Take 1 tablet by mouth daily), Disp: 180 tablet, Rfl: 3    furosemide (LASIX) 40 MG tablet,

## 2024-03-26 PROCEDURE — 93295 DEV INTERROG REMOTE 1/2/MLT: CPT | Performed by: INTERNAL MEDICINE

## 2024-03-26 PROCEDURE — 93296 REM INTERROG EVL PM/IDS: CPT | Performed by: INTERNAL MEDICINE

## 2024-03-28 ENCOUNTER — OFFICE VISIT (OUTPATIENT)
Age: 74
End: 2024-03-28
Payer: MEDICARE

## 2024-03-28 VITALS
BODY MASS INDEX: 33.13 KG/M2 | SYSTOLIC BLOOD PRESSURE: 130 MMHG | DIASTOLIC BLOOD PRESSURE: 70 MMHG | HEIGHT: 62 IN | WEIGHT: 180 LBS

## 2024-03-28 DIAGNOSIS — Z95.810 BIVENTRICULAR IMPLANTABLE CARDIOVERTER-DEFIBRILLATOR IN SITU: Primary | ICD-10-CM

## 2024-03-28 DIAGNOSIS — I48.0 PAF (PAROXYSMAL ATRIAL FIBRILLATION) (HCC): ICD-10-CM

## 2024-03-28 DIAGNOSIS — I10 PRIMARY HYPERTENSION: ICD-10-CM

## 2024-03-28 DIAGNOSIS — I50.22 CHRONIC SYSTOLIC HEART FAILURE (HCC): ICD-10-CM

## 2024-03-28 PROCEDURE — 1123F ACP DISCUSS/DSCN MKR DOCD: CPT | Performed by: INTERNAL MEDICINE

## 2024-03-28 PROCEDURE — G8417 CALC BMI ABV UP PARAM F/U: HCPCS | Performed by: INTERNAL MEDICINE

## 2024-03-28 PROCEDURE — 1090F PRES/ABSN URINE INCON ASSESS: CPT | Performed by: INTERNAL MEDICINE

## 2024-03-28 PROCEDURE — G8427 DOCREV CUR MEDS BY ELIG CLIN: HCPCS | Performed by: INTERNAL MEDICINE

## 2024-03-28 PROCEDURE — G8400 PT W/DXA NO RESULTS DOC: HCPCS | Performed by: INTERNAL MEDICINE

## 2024-03-28 PROCEDURE — 3078F DIAST BP <80 MM HG: CPT | Performed by: INTERNAL MEDICINE

## 2024-03-28 PROCEDURE — G8484 FLU IMMUNIZE NO ADMIN: HCPCS | Performed by: INTERNAL MEDICINE

## 2024-03-28 PROCEDURE — 1036F TOBACCO NON-USER: CPT | Performed by: INTERNAL MEDICINE

## 2024-03-28 PROCEDURE — 99214 OFFICE O/P EST MOD 30 MIN: CPT | Performed by: INTERNAL MEDICINE

## 2024-03-28 PROCEDURE — 3075F SYST BP GE 130 - 139MM HG: CPT | Performed by: INTERNAL MEDICINE

## 2024-03-28 PROCEDURE — 3017F COLORECTAL CA SCREEN DOC REV: CPT | Performed by: INTERNAL MEDICINE

## 2024-03-28 RX ORDER — LISINOPRIL 10 MG/1
10 TABLET ORAL 2 TIMES DAILY
Qty: 180 TABLET | Refills: 3 | Status: SHIPPED | OUTPATIENT
Start: 2024-03-28

## 2024-03-28 ASSESSMENT — ENCOUNTER SYMPTOMS
BOWEL INCONTINENCE: 0
COLOR CHANGE: 0
HEMATEMESIS: 0
SPUTUM PRODUCTION: 0
BLURRED VISION: 0
DIARRHEA: 0
SHORTNESS OF BREATH: 0
HEMATOCHEZIA: 0
HOARSE VOICE: 0
ORTHOPNEA: 0
WHEEZING: 0
ABDOMINAL PAIN: 0

## 2024-03-28 NOTE — PROGRESS NOTES
Alcohol use: No       ROS:    Review of Systems   Constitutional: Positive for malaise/fatigue. Negative for chills, decreased appetite, diaphoresis and fever.   HENT:  Negative for congestion, hearing loss, hoarse voice and nosebleeds.    Eyes:  Negative for blurred vision.   Cardiovascular:  Positive for dyspnea on exertion. Negative for chest pain, claudication, cyanosis, irregular heartbeat, leg swelling, near-syncope, orthopnea, palpitations, paroxysmal nocturnal dyspnea and syncope.   Respiratory:  Negative for shortness of breath, sputum production and wheezing.    Endocrine: Negative for polydipsia, polyphagia and polyuria.   Skin:  Negative for color change.   Gastrointestinal:  Negative for abdominal pain, bowel incontinence, diarrhea, hematemesis and hematochezia.   Genitourinary:  Negative for dysuria, frequency and hematuria.   Neurological:  Negative for dizziness, focal weakness, light-headedness, loss of balance, numbness, sensory change and weakness.   Psychiatric/Behavioral:  Negative for altered mental status and memory loss. The patient is nervous/anxious.            PHYSICAL EXAM:   /70   Ht 1.575 m (5' 2\")   Wt 81.6 kg (180 lb)   BMI 32.92 kg/m²      Physical Exam  Constitutional:       Appearance: Normal appearance.   HENT:      Head: Normocephalic and atraumatic.      Nose: Nose normal.   Eyes:      Extraocular Movements: Extraocular movements intact.      Pupils: Pupils are equal, round, and reactive to light.   Neck:      Vascular: No carotid bruit.   Cardiovascular:      Rate and Rhythm: Regular rhythm.      Pulses: Normal pulses.      Heart sounds: No murmur heard.  Pulmonary:      Effort: Pulmonary effort is normal.      Breath sounds: Normal breath sounds.   Abdominal:      General: Abdomen is flat. Bowel sounds are normal.      Palpations: Abdomen is soft.   Musculoskeletal:         General: Normal range of motion.      Cervical back: Normal range of motion and neck supple.

## 2024-03-29 ENCOUNTER — NURSE ONLY (OUTPATIENT)
Age: 74
End: 2024-03-29

## 2024-03-29 DIAGNOSIS — I50.22 CHRONIC SYSTOLIC HEART FAILURE (HCC): Primary | ICD-10-CM

## 2024-04-17 ENCOUNTER — OFFICE VISIT (OUTPATIENT)
Age: 74
End: 2024-04-17
Payer: MEDICARE

## 2024-04-17 VITALS
DIASTOLIC BLOOD PRESSURE: 78 MMHG | BODY MASS INDEX: 33.31 KG/M2 | HEIGHT: 62 IN | HEART RATE: 60 BPM | SYSTOLIC BLOOD PRESSURE: 136 MMHG | WEIGHT: 181 LBS

## 2024-04-17 DIAGNOSIS — Z45.02 ICD (IMPLANTABLE CARDIOVERTER-DEFIBRILLATOR) BATTERY DEPLETION: ICD-10-CM

## 2024-04-17 DIAGNOSIS — I42.8 NICM (NONISCHEMIC CARDIOMYOPATHY) (HCC): ICD-10-CM

## 2024-04-17 DIAGNOSIS — I48.0 PAF (PAROXYSMAL ATRIAL FIBRILLATION) (HCC): Primary | ICD-10-CM

## 2024-04-17 LAB
ANION GAP SERPL CALC-SCNC: 4 MMOL/L (ref 2–11)
BUN SERPL-MCNC: 15 MG/DL (ref 8–23)
CALCIUM SERPL-MCNC: 9.4 MG/DL (ref 8.3–10.4)
CHLORIDE SERPL-SCNC: 104 MMOL/L (ref 103–113)
CO2 SERPL-SCNC: 27 MMOL/L (ref 21–32)
CREAT SERPL-MCNC: 0.9 MG/DL (ref 0.6–1)
ERYTHROCYTE [DISTWIDTH] IN BLOOD BY AUTOMATED COUNT: 13.9 % (ref 11.9–14.6)
GLUCOSE SERPL-MCNC: 110 MG/DL (ref 65–100)
HCT VFR BLD AUTO: 36.7 % (ref 35.8–46.3)
HGB BLD-MCNC: 11.8 G/DL (ref 11.7–15.4)
INR PPP: 2.3
MAGNESIUM SERPL-MCNC: 2.1 MG/DL (ref 1.8–2.4)
MCH RBC QN AUTO: 30.9 PG (ref 26.1–32.9)
MCHC RBC AUTO-ENTMCNC: 32.2 G/DL (ref 31.4–35)
MCV RBC AUTO: 96.1 FL (ref 82–102)
NRBC # BLD: 0 K/UL (ref 0–0.2)
PLATELET # BLD AUTO: 248 K/UL (ref 150–450)
PMV BLD AUTO: 11.2 FL (ref 9.4–12.3)
POTASSIUM SERPL-SCNC: 4.5 MMOL/L (ref 3.5–5.1)
PROTHROMBIN TIME: 26.6 SEC (ref 11.3–14.9)
RBC # BLD AUTO: 3.82 M/UL (ref 4.05–5.2)
SODIUM SERPL-SCNC: 135 MMOL/L (ref 136–146)
WBC # BLD AUTO: 7 K/UL (ref 4.3–11.1)

## 2024-04-17 PROCEDURE — G8427 DOCREV CUR MEDS BY ELIG CLIN: HCPCS | Performed by: INTERNAL MEDICINE

## 2024-04-17 PROCEDURE — 3078F DIAST BP <80 MM HG: CPT | Performed by: INTERNAL MEDICINE

## 2024-04-17 PROCEDURE — 1123F ACP DISCUSS/DSCN MKR DOCD: CPT | Performed by: INTERNAL MEDICINE

## 2024-04-17 PROCEDURE — 93000 ELECTROCARDIOGRAM COMPLETE: CPT | Performed by: INTERNAL MEDICINE

## 2024-04-17 PROCEDURE — 3017F COLORECTAL CA SCREEN DOC REV: CPT | Performed by: INTERNAL MEDICINE

## 2024-04-17 PROCEDURE — 1090F PRES/ABSN URINE INCON ASSESS: CPT | Performed by: INTERNAL MEDICINE

## 2024-04-17 PROCEDURE — G8400 PT W/DXA NO RESULTS DOC: HCPCS | Performed by: INTERNAL MEDICINE

## 2024-04-17 PROCEDURE — 99214 OFFICE O/P EST MOD 30 MIN: CPT | Performed by: INTERNAL MEDICINE

## 2024-04-17 PROCEDURE — 1036F TOBACCO NON-USER: CPT | Performed by: INTERNAL MEDICINE

## 2024-04-17 PROCEDURE — G8417 CALC BMI ABV UP PARAM F/U: HCPCS | Performed by: INTERNAL MEDICINE

## 2024-04-17 PROCEDURE — 3075F SYST BP GE 130 - 139MM HG: CPT | Performed by: INTERNAL MEDICINE

## 2024-04-17 NOTE — PROGRESS NOTES
Encounters:   04/17/24 136/78   03/28/24 130/70   03/06/24 110/68   02/28/24 120/74   01/19/24 114/64       General appearance: Alert, well appearing, and in no distress   CV: RRR, no M/R/G, no JVD, normal distal pulses, no lower extremity edema  Pulm: Clear to auscultation, no wheezes, no crackles, no accessory muscle use  GI: Soft, nontender, nondistended  Neuro: Alert and oriented    Medical problems and test results were reviewed with the patient today.     Lab Results   Component Value Date/Time    K 4.9 02/28/2024 01:46 PM     Creatinine   Date Value Ref Range Status   02/28/2024 0.80 0.6 - 1.0 MG/DL Final     Lab Results   Component Value Date/Time    HGB 13.0 02/28/2024 01:46 PM     Lab Results   Component Value Date/Time    INR 1.2 09/01/2022 06:39 AM    INR 1.1 09/01/2022 06:24 AM    INR 2.2 08/23/2021 02:21 PM    INR 2.1 09/30/2019 04:11 AM    INR 1.9 09/29/2019 04:42 AM       EKG: (EKG has been independently visualized by me with interpretation below)  biventricular pacing tracking CRISPIN Barraza was seen today for atrial fibrillation and hypertension.    Diagnoses and all orders for this visit:    PAF (paroxysmal atrial fibrillation) ([facility])  -     EKG 12 lead    ICD (implantable cardioverter-defibrillator) battery depletion  -     Case Request EP Lab  -     Basic Metabolic Panel; Future  -     CBC; Future  -     Magnesium; Future  -     Protime-INR; Future        ASSESSMENT and PLAN  1. NSVT: cont tikosyn, QTc acceptable, cont to monitor on device, last check this month was normal     2. ICD JAMIR: I had a discussion today with the patient regarding the risks, benefits, and alternatives of an implantable cardiac rhythm device generator change.  I discussed with the patient the potential risks of ICD gen change, including the risk of bleeding, infection, or exposure of a lead integrity issue.  I explained there is currently no evidence of a lead problem, but if something were to be exposed during the

## 2024-04-29 RX ORDER — SODIUM CHLORIDE 0.9 % (FLUSH) 0.9 %
5-40 SYRINGE (ML) INJECTION EVERY 12 HOURS SCHEDULED
Status: CANCELLED | OUTPATIENT
Start: 2024-04-29

## 2024-04-29 RX ORDER — SODIUM CHLORIDE 9 MG/ML
INJECTION, SOLUTION INTRAVENOUS PRN
Status: CANCELLED | OUTPATIENT
Start: 2024-04-29

## 2024-04-29 RX ORDER — ACETAMINOPHEN 500 MG
1000 TABLET ORAL ONCE
Status: CANCELLED | OUTPATIENT
Start: 2024-04-29 | End: 2024-04-29

## 2024-04-29 RX ORDER — SODIUM CHLORIDE, SODIUM LACTATE, POTASSIUM CHLORIDE, CALCIUM CHLORIDE 600; 310; 30; 20 MG/100ML; MG/100ML; MG/100ML; MG/100ML
INJECTION, SOLUTION INTRAVENOUS CONTINUOUS
Status: CANCELLED | OUTPATIENT
Start: 2024-04-29

## 2024-04-29 RX ORDER — LIDOCAINE HYDROCHLORIDE 10 MG/ML
1 INJECTION, SOLUTION INFILTRATION; PERINEURAL
Status: CANCELLED | OUTPATIENT
Start: 2024-04-29 | End: 2024-04-30

## 2024-04-29 RX ORDER — SODIUM CHLORIDE 0.9 % (FLUSH) 0.9 %
5-40 SYRINGE (ML) INJECTION PRN
Status: CANCELLED | OUTPATIENT
Start: 2024-04-29

## 2024-04-29 NOTE — PROGRESS NOTES
Patient pre-assessment complete for Bi-V ICD change out with Dr. Heredia scheduled for  at 1100, arrival time 0830. Patient verified using . Patient instructed to bring a list of home medications on the day of procedure. NPO status reinforced. Patient instructed to follow EP Information Sheet given at appointment. Patient verbalizes understanding of all instructions & denies any questions at this time.

## 2024-04-30 ENCOUNTER — ANESTHESIA (OUTPATIENT)
Dept: CARDIAC CATH/INVASIVE PROCEDURES | Age: 74
End: 2024-04-30
Payer: MEDICARE

## 2024-04-30 ENCOUNTER — ANESTHESIA EVENT (OUTPATIENT)
Dept: CARDIAC CATH/INVASIVE PROCEDURES | Age: 74
End: 2024-04-30
Payer: MEDICARE

## 2024-04-30 ENCOUNTER — HOSPITAL ENCOUNTER (OUTPATIENT)
Age: 74
Setting detail: OUTPATIENT SURGERY
Discharge: HOME | End: 2024-04-30
Attending: INTERNAL MEDICINE | Admitting: INTERNAL MEDICINE
Payer: MEDICARE

## 2024-04-30 VITALS
TEMPERATURE: 98.6 F | DIASTOLIC BLOOD PRESSURE: 77 MMHG | OXYGEN SATURATION: 95 % | RESPIRATION RATE: 18 BRPM | HEART RATE: 78 BPM | SYSTOLIC BLOOD PRESSURE: 126 MMHG

## 2024-04-30 DIAGNOSIS — I42.8 NICM (NONISCHEMIC CARDIOMYOPATHY) (HCC): ICD-10-CM

## 2024-04-30 LAB
ANION GAP SERPL CALC-SCNC: 10 MMOL/L (ref 9–18)
BASOPHILS # BLD: 0 K/UL (ref 0–0.2)
BASOPHILS NFR BLD: 0 % (ref 0–2)
BUN SERPL-MCNC: 20 MG/DL (ref 8–23)
CALCIUM SERPL-MCNC: 8.9 MG/DL (ref 8.8–10.2)
CHLORIDE SERPL-SCNC: 100 MMOL/L (ref 98–107)
CO2 SERPL-SCNC: 26 MMOL/L (ref 20–28)
CREAT SERPL-MCNC: 0.84 MG/DL (ref 0.6–1.1)
DIFFERENTIAL METHOD BLD: ABNORMAL
EKG ATRIAL RATE: 65 BPM
EKG DIAGNOSIS: NORMAL
EKG P AXIS: 75 DEGREES
EKG P-R INTERVAL: 180 MS
EKG Q-T INTERVAL: 512 MS
EKG QRS DURATION: 128 MS
EKG QTC CALCULATION (BAZETT): 532 MS
EKG R AXIS: 213 DEGREES
EKG T AXIS: 44 DEGREES
EKG VENTRICULAR RATE: 65 BPM
EOSINOPHIL # BLD: 0.1 K/UL (ref 0–0.8)
EOSINOPHIL NFR BLD: 1 % (ref 0.5–7.8)
ERYTHROCYTE [DISTWIDTH] IN BLOOD BY AUTOMATED COUNT: 13.7 % (ref 11.9–14.6)
GLUCOSE SERPL-MCNC: 106 MG/DL (ref 70–99)
HCT VFR BLD AUTO: 35.6 % (ref 35.8–46.3)
HGB BLD-MCNC: 11.6 G/DL (ref 11.7–15.4)
IMM GRANULOCYTES # BLD AUTO: 0.1 K/UL (ref 0–0.5)
IMM GRANULOCYTES NFR BLD AUTO: 1 % (ref 0–5)
INR PPP: 1.8
LYMPHOCYTES # BLD: 1 K/UL (ref 0.5–4.6)
LYMPHOCYTES NFR BLD: 9 % (ref 13–44)
MAGNESIUM SERPL-MCNC: 1.7 MG/DL (ref 1.8–2.4)
MCH RBC QN AUTO: 30.6 PG (ref 26.1–32.9)
MCHC RBC AUTO-ENTMCNC: 32.6 G/DL (ref 31.4–35)
MCV RBC AUTO: 93.9 FL (ref 82–102)
MONOCYTES # BLD: 0.8 K/UL (ref 0.1–1.3)
MONOCYTES NFR BLD: 7 % (ref 4–12)
NEUTS SEG # BLD: 8.9 K/UL (ref 1.7–8.2)
NEUTS SEG NFR BLD: 82 % (ref 43–78)
NRBC # BLD: 0 K/UL (ref 0–0.2)
PLATELET # BLD AUTO: 267 K/UL (ref 150–450)
PMV BLD AUTO: 10.5 FL (ref 9.4–12.3)
POTASSIUM SERPL-SCNC: 3.9 MMOL/L (ref 3.5–5.1)
PROTHROMBIN TIME: 21.8 SEC (ref 11.3–14.9)
RBC # BLD AUTO: 3.79 M/UL (ref 4.05–5.2)
SODIUM SERPL-SCNC: 136 MMOL/L (ref 136–145)
WBC # BLD AUTO: 10.8 K/UL (ref 4.3–11.1)

## 2024-04-30 PROCEDURE — 2709999900 HC NON-CHARGEABLE SUPPLY: Performed by: INTERNAL MEDICINE

## 2024-04-30 PROCEDURE — 2580000003 HC RX 258: Performed by: INTERNAL MEDICINE

## 2024-04-30 PROCEDURE — 80048 BASIC METABOLIC PNL TOTAL CA: CPT

## 2024-04-30 PROCEDURE — 2500000003 HC RX 250 WO HCPCS: Performed by: INTERNAL MEDICINE

## 2024-04-30 PROCEDURE — 3700000001 HC ADD 15 MINUTES (ANESTHESIA): Performed by: INTERNAL MEDICINE

## 2024-04-30 PROCEDURE — 85025 COMPLETE CBC W/AUTO DIFF WBC: CPT

## 2024-04-30 PROCEDURE — 93005 ELECTROCARDIOGRAM TRACING: CPT | Performed by: INTERNAL MEDICINE

## 2024-04-30 PROCEDURE — 2580000003 HC RX 258: Performed by: NURSE ANESTHETIST, CERTIFIED REGISTERED

## 2024-04-30 PROCEDURE — 2720000010 HC SURG SUPPLY STERILE: Performed by: INTERNAL MEDICINE

## 2024-04-30 PROCEDURE — 85610 PROTHROMBIN TIME: CPT

## 2024-04-30 PROCEDURE — A4217 STERILE WATER/SALINE, 500 ML: HCPCS | Performed by: INTERNAL MEDICINE

## 2024-04-30 PROCEDURE — 83735 ASSAY OF MAGNESIUM: CPT

## 2024-04-30 PROCEDURE — 6360000002 HC RX W HCPCS: Performed by: ANESTHESIOLOGY

## 2024-04-30 PROCEDURE — C1882 AICD, OTHER THAN SING/DUAL: HCPCS | Performed by: INTERNAL MEDICINE

## 2024-04-30 PROCEDURE — 6360000002 HC RX W HCPCS: Performed by: NURSE ANESTHETIST, CERTIFIED REGISTERED

## 2024-04-30 PROCEDURE — 93010 ELECTROCARDIOGRAM REPORT: CPT | Performed by: INTERNAL MEDICINE

## 2024-04-30 PROCEDURE — 33264 RMVL & RPLCMT DFB GEN MLT LD: CPT | Performed by: INTERNAL MEDICINE

## 2024-04-30 PROCEDURE — 6360000002 HC RX W HCPCS: Performed by: INTERNAL MEDICINE

## 2024-04-30 PROCEDURE — 3700000000 HC ANESTHESIA ATTENDED CARE: Performed by: INTERNAL MEDICINE

## 2024-04-30 PROCEDURE — 2500000003 HC RX 250 WO HCPCS: Performed by: NURSE ANESTHETIST, CERTIFIED REGISTERED

## 2024-04-30 DEVICE — HF CARDIAC RESYNCHRONISATION THERAPY DEFIBRILLATOR VVED DDDRV
Type: IMPLANTABLE DEVICE | Status: FUNCTIONAL
Brand: GALLANT™

## 2024-04-30 RX ORDER — SODIUM CHLORIDE, SODIUM LACTATE, POTASSIUM CHLORIDE, CALCIUM CHLORIDE 600; 310; 30; 20 MG/100ML; MG/100ML; MG/100ML; MG/100ML
INJECTION, SOLUTION INTRAVENOUS CONTINUOUS
Status: DISCONTINUED | OUTPATIENT
Start: 2024-04-30 | End: 2024-04-30 | Stop reason: HOSPADM

## 2024-04-30 RX ORDER — PHENYLEPHRINE HYDROCHLORIDE 10 MG/ML
INJECTION INTRAVENOUS PRN
Status: DISCONTINUED | OUTPATIENT
Start: 2024-04-30 | End: 2024-04-30 | Stop reason: SDUPTHER

## 2024-04-30 RX ORDER — HYDROMORPHONE HYDROCHLORIDE 2 MG/ML
0.5 INJECTION, SOLUTION INTRAMUSCULAR; INTRAVENOUS; SUBCUTANEOUS EVERY 5 MIN PRN
Status: DISCONTINUED | OUTPATIENT
Start: 2024-04-30 | End: 2024-04-30 | Stop reason: HOSPADM

## 2024-04-30 RX ORDER — ONDANSETRON 2 MG/ML
4 INJECTION INTRAMUSCULAR; INTRAVENOUS
Status: DISCONTINUED | OUTPATIENT
Start: 2024-04-30 | End: 2024-04-30 | Stop reason: HOSPADM

## 2024-04-30 RX ORDER — MAGNESIUM SULFATE IN WATER 40 MG/ML
2000 INJECTION, SOLUTION INTRAVENOUS ONCE
Status: COMPLETED | OUTPATIENT
Start: 2024-04-30 | End: 2024-04-30

## 2024-04-30 RX ORDER — PROPOFOL 10 MG/ML
INJECTION, EMULSION INTRAVENOUS PRN
Status: DISCONTINUED | OUTPATIENT
Start: 2024-04-30 | End: 2024-04-30 | Stop reason: SDUPTHER

## 2024-04-30 RX ORDER — OXYCODONE HYDROCHLORIDE 5 MG/1
5 TABLET ORAL
Status: DISCONTINUED | OUTPATIENT
Start: 2024-04-30 | End: 2024-04-30 | Stop reason: HOSPADM

## 2024-04-30 RX ORDER — SODIUM CHLORIDE, SODIUM LACTATE, POTASSIUM CHLORIDE, CALCIUM CHLORIDE 600; 310; 30; 20 MG/100ML; MG/100ML; MG/100ML; MG/100ML
INJECTION, SOLUTION INTRAVENOUS CONTINUOUS PRN
Status: DISCONTINUED | OUTPATIENT
Start: 2024-04-30 | End: 2024-04-30 | Stop reason: SDUPTHER

## 2024-04-30 RX ORDER — LIDOCAINE HYDROCHLORIDE 20 MG/ML
INJECTION, SOLUTION EPIDURAL; INFILTRATION; INTRACAUDAL; PERINEURAL PRN
Status: DISCONTINUED | OUTPATIENT
Start: 2024-04-30 | End: 2024-04-30 | Stop reason: SDUPTHER

## 2024-04-30 RX ORDER — PROCHLORPERAZINE EDISYLATE 5 MG/ML
5 INJECTION INTRAMUSCULAR; INTRAVENOUS
Status: DISCONTINUED | OUTPATIENT
Start: 2024-04-30 | End: 2024-04-30 | Stop reason: HOSPADM

## 2024-04-30 RX ORDER — NALOXONE HYDROCHLORIDE 0.4 MG/ML
INJECTION, SOLUTION INTRAMUSCULAR; INTRAVENOUS; SUBCUTANEOUS PRN
Status: DISCONTINUED | OUTPATIENT
Start: 2024-04-30 | End: 2024-04-30 | Stop reason: HOSPADM

## 2024-04-30 RX ORDER — GLYCOPYRROLATE 0.2 MG/ML
INJECTION INTRAMUSCULAR; INTRAVENOUS PRN
Status: DISCONTINUED | OUTPATIENT
Start: 2024-04-30 | End: 2024-04-30 | Stop reason: SDUPTHER

## 2024-04-30 RX ADMIN — PHENYLEPHRINE HYDROCHLORIDE 200 MCG: 10 INJECTION INTRAVENOUS at 10:54

## 2024-04-30 RX ADMIN — LIDOCAINE HYDROCHLORIDE 80 MG: 20 INJECTION, SOLUTION EPIDURAL; INFILTRATION; INTRACAUDAL; PERINEURAL at 10:26

## 2024-04-30 RX ADMIN — PROPOFOL 140 MCG/KG/MIN: 10 INJECTION, EMULSION INTRAVENOUS at 10:27

## 2024-04-30 RX ADMIN — GLYCOPYRROLATE 0.2 MG: 0.2 INJECTION INTRAMUSCULAR; INTRAVENOUS at 10:28

## 2024-04-30 RX ADMIN — Medication 2 G: at 10:30

## 2024-04-30 RX ADMIN — SODIUM CHLORIDE, SODIUM LACTATE, POTASSIUM CHLORIDE, AND CALCIUM CHLORIDE: 600; 310; 30; 20 INJECTION, SOLUTION INTRAVENOUS at 10:22

## 2024-04-30 RX ADMIN — MAGNESIUM SULFATE HEPTAHYDRATE 2000 MG: 40 INJECTION, SOLUTION INTRAVENOUS at 10:00

## 2024-04-30 RX ADMIN — PROPOFOL 50 MG: 10 INJECTION, EMULSION INTRAVENOUS at 10:26

## 2024-04-30 ASSESSMENT — ENCOUNTER SYMPTOMS: SHORTNESS OF BREATH: 1

## 2024-04-30 NOTE — PROGRESS NOTES
Patient received to CPRU room # 12  Ambulatory from [facility]. Patient scheduled for ICD gen change today with Dr Heredia. Procedure reviewed & questions answered, voiced good understanding consent obtained & placed on chart. All medications and medical history reviewed. Will prep patient per orders. Patient & family updated on plan of care.      The patient has a fraility score of 3-MANAGING WELL, based on ability to perform ADLS by self.

## 2024-04-30 NOTE — ANESTHESIA PRE PROCEDURE
Department of Anesthesiology  Preprocedure Note       Name:  Christi Garibay   Age:  73 y.o.  :  1950                                          MRN:  [id number]         Date:  2024      Surgeon: Surgeon(s):  Carlos Heredia MD    Procedure: Procedure(s):  Remove & replace ICD biv multi leads    Medications prior to admission:   Prior to Admission medications    Medication Sig Start Date End Date Taking? Authorizing Provider   lisinopril (PRINIVIL;ZESTRIL) 10 MG tablet Take 1 tablet by mouth 2 times daily 3/28/24   El Hong MD   dofetilide (TIKOSYN) 250 MCG capsule Take 1 capsule by mouth 2 times daily 24   El Hong MD   FLUoxetine (PROZAC) 20 MG capsule Take 2 capsules by mouth daily 3/9/23   Janet Alford MD   furosemide (LASIX) 40 MG tablet TAKE 1 TABLET EVERY DAY 3/6/23   El Hong MD   rosuvastatin (CRESTOR) 40 MG tablet  11/10/22   Janet Alford MD   predniSONE (DELTASONE) 5 MG tablet Take 2 tablets by mouth daily    Janet Alford MD   warfarin (COUMADIN) 5 MG tablet Take 1 tablet by mouth As directed    ProviderJanet MD   calcium carbonate 600 MG TABS tablet Take by mouth daily    ProviderJanet MD   carvedilol (COREG) 12.5 MG tablet Take 1 tablet by mouth 2 times daily 22   El Hong MD   vitamin D3 (CHOLECALCIFEROL) 125 MCG (5000 UT) TABS tablet Take by mouth daily    Automatic Reconciliation, Ar   cyanocobalamin 500 MCG tablet Take 1 tablet by mouth    Automatic Reconciliation, Ar   estradiol (ESTRACE) 2 MG tablet Take 1 tablet by mouth daily    Automatic Reconciliation, Ar   fluticasone (FLONASE) 50 MCG/ACT nasal spray 2 sprays by Nasal route daily PRN    Automatic Reconciliation, Ar   loratadine (CLARITIN) 10 MG tablet Take 1 tablet by mouth    Automatic Reconciliation, Ar   LORazepam (ATIVAN) 0.5 MG tablet Take by mouth every 8 hours as needed.    Automatic Reconciliation, Ar   magnesium oxide (MAG-OX)

## 2024-04-30 NOTE — PROGRESS NOTES
Report received from Lito EDMONDSON Lab RN. Procedural finding communicated. Intra procedural medication administration reviewed. Progression of care discussed.    Patient received into CPRU room 1, Post gen change    Access site without bleeding or swelling. Left chest    Patient instructed to limit movement of left upper extremity.    Routine post procedural vital signs & site assessment initiated.    Pt coughing extensively upon arrival.  States she has nasal drainage and dry throat.  Simple mask changed to nasal cannula 4 lpm.

## 2024-04-30 NOTE — ANESTHESIA POSTPROCEDURE EVALUATION
Department of Anesthesiology  Postprocedure Note    Patient: Christi Garibay  MRN: [id number]  YOB: 1950  Date of evaluation: 4/30/2024    Procedure Summary       Date: 04/30/24 Room / Location: [facility] 2 ALL EVENTS / [facility] CARDIAC CATH LAB    Anesthesia Start: 1017 Anesthesia Stop: 1119    Procedure: Remove & replace ICD biv multi leads Diagnosis:       NICM (nonischemic cardiomyopathy) (HCC)      (NICM (nonischemic cardiomyopathy) (HCC) [I42.8])    Providers: Carlos Heredia MD Responsible Provider: Romulo Chaney MD    Anesthesia Type: TIVA ASA Status: 4            Anesthesia Type: No value filed.    Meg Phase I: Meg Score: 9    Meg Phase II:      Anesthesia Post Evaluation    Patient location during evaluation: PACU  Patient participation: complete - patient participated  Level of consciousness: awake and alert  Airway patency: patent  Nausea & Vomiting: no nausea and no vomiting  Cardiovascular status: hemodynamically stable  Respiratory status: acceptable, nonlabored ventilation and spontaneous ventilation  Hydration status: euvolemic  Comments: /74   Pulse 74   Temp 98.6 °F (37 °C) (Skin)   Resp 14   SpO2 94%   Breastfeeding No     Multimodal analgesia pain management approach  Pain management: adequate and satisfactory to patient    There were no known notable events for this encounter.

## 2024-04-30 NOTE — DISCHARGE INSTRUCTIONS
Post Op Generator Change Instructions        Incision & Dressing Care   Please keep Aquacel dressing on wound until your 2 week follow up in device clinic. The dressing promotes healing and is meant to stay on the wound. Keep incision site completely dry for 48 hours. During this time you may take a sponge bath, but no shower. After 48 hours you may shower with your back to the water letting the water run over the dressing. Do not let the water directly hit the dressing, it is water resistant no water proof. Do not use any lotions, creams, or ointments on the incision.    Avoid manipulating the device or leads with your fingers. Do not massage or excessively rub the implant site. This could displace the leads           Call you doctor for any of the following:   Any signs of infection, including redness, swelling or pain at the incision site, or a   temperature of 101° F or greater.   If you have twitching chest muscles, hiccups that won't stop, or a swollen arm on the   side of the incision.   Any feelings of light-headedness, chest pain, or shortness of breath.             Please call the Device Clinic at  [phone] if you have questions or concerns about your device. Please call the hospital if it is after 5 pm or the weekend please page the on call cardiologist at [facility] at [phone]         Follow Up Appointments  You will need to have your device checked 1- 2 weeks after the procedure and should have an appointment with the device clinic. If you do not hear from them call the device clinic.      Sedation for a Medical Procedure: Care Instructions     You were given a sedative medication during your visit. While many of the effects will have worn   off before you leave; you may continue to feel some effects for several hours.      Common side effects from sedation include:  Feeling sleepy. (Your doctors and nurses will make sure you are not too sleepy to go home.)  Nausea and vomiting.

## 2024-05-13 DIAGNOSIS — D50.8 OTHER IRON DEFICIENCY ANEMIA: Primary | ICD-10-CM

## 2024-05-14 ENCOUNTER — HOSPITAL ENCOUNTER (OUTPATIENT)
Dept: LAB | Age: 74
Discharge: HOME | End: 2024-05-17
Payer: MEDICARE

## 2024-05-14 ENCOUNTER — NURSE ONLY (OUTPATIENT)
Age: 74
End: 2024-05-14
Payer: MEDICARE

## 2024-05-14 ENCOUNTER — OFFICE VISIT (OUTPATIENT)
Dept: ONCOLOGY | Age: 74
End: 2024-05-14
Payer: MEDICARE

## 2024-05-14 VITALS
RESPIRATION RATE: 16 BRPM | WEIGHT: 182.7 LBS | SYSTOLIC BLOOD PRESSURE: 123 MMHG | DIASTOLIC BLOOD PRESSURE: 69 MMHG | OXYGEN SATURATION: 95 % | HEIGHT: 62 IN | BODY MASS INDEX: 33.62 KG/M2 | HEART RATE: 68 BPM | TEMPERATURE: 98.1 F

## 2024-05-14 DIAGNOSIS — K21.9 CHRONIC GERD: ICD-10-CM

## 2024-05-14 DIAGNOSIS — E78.49 OTHER HYPERLIPIDEMIA: ICD-10-CM

## 2024-05-14 DIAGNOSIS — I48.0 PAF (PAROXYSMAL ATRIAL FIBRILLATION) (HCC): ICD-10-CM

## 2024-05-14 DIAGNOSIS — D50.8 OTHER IRON DEFICIENCY ANEMIA: ICD-10-CM

## 2024-05-14 DIAGNOSIS — D50.8 OTHER IRON DEFICIENCY ANEMIA: Primary | ICD-10-CM

## 2024-05-14 DIAGNOSIS — I42.8 NICM (NONISCHEMIC CARDIOMYOPATHY) (HCC): Primary | ICD-10-CM

## 2024-05-14 DIAGNOSIS — I50.22 CHRONIC SYSTOLIC HEART FAILURE (HCC): ICD-10-CM

## 2024-05-14 LAB
ALBUMIN SERPL-MCNC: 3.5 G/DL (ref 3.2–4.6)
ALBUMIN/GLOB SERPL: 1.3 (ref 1–1.9)
ALP SERPL-CCNC: 68 U/L (ref 35–104)
ALT SERPL-CCNC: 9 U/L (ref 12–65)
ANION GAP SERPL CALC-SCNC: 10 MMOL/L (ref 9–18)
AST SERPL-CCNC: 21 U/L (ref 15–37)
BASOPHILS # BLD: 0 K/UL (ref 0–0.2)
BASOPHILS NFR BLD: 1 % (ref 0–2)
BILIRUB SERPL-MCNC: <0.2 MG/DL (ref 0–1.2)
BUN SERPL-MCNC: 16 MG/DL (ref 8–23)
CALCIUM SERPL-MCNC: 8.8 MG/DL (ref 8.8–10.2)
CHLORIDE SERPL-SCNC: 102 MMOL/L (ref 98–107)
CO2 SERPL-SCNC: 24 MMOL/L (ref 20–28)
CREAT SERPL-MCNC: 0.8 MG/DL (ref 0.6–1.1)
DIFFERENTIAL METHOD BLD: ABNORMAL
EOSINOPHIL # BLD: 0 K/UL (ref 0–0.8)
EOSINOPHIL NFR BLD: 0 % (ref 0.5–7.8)
ERYTHROCYTE [DISTWIDTH] IN BLOOD BY AUTOMATED COUNT: 13.5 % (ref 11.9–14.6)
FERRITIN SERPL-MCNC: 343 NG/ML (ref 8–388)
GLOBULIN SER CALC-MCNC: 2.7 G/DL (ref 2.3–3.5)
GLUCOSE SERPL-MCNC: 133 MG/DL (ref 70–99)
HCT VFR BLD AUTO: 34.6 % (ref 35.8–46.3)
HGB BLD-MCNC: 11.6 G/DL (ref 11.7–15.4)
IMM GRANULOCYTES # BLD AUTO: 0 K/UL (ref 0–0.5)
IMM GRANULOCYTES NFR BLD AUTO: 0 % (ref 0–5)
LYMPHOCYTES # BLD: 0.7 K/UL (ref 0.5–4.6)
LYMPHOCYTES NFR BLD: 8 % (ref 13–44)
MCH RBC QN AUTO: 31.3 PG (ref 26.1–32.9)
MCHC RBC AUTO-ENTMCNC: 33.5 G/DL (ref 31.4–35)
MCV RBC AUTO: 93.3 FL (ref 82–102)
MONOCYTES # BLD: 0.4 K/UL (ref 0.1–1.3)
MONOCYTES NFR BLD: 5 % (ref 4–12)
NEUTS SEG # BLD: 6.9 K/UL (ref 1.7–8.2)
NEUTS SEG NFR BLD: 86 % (ref 43–78)
NRBC # BLD: 0 K/UL (ref 0–0.2)
PLATELET # BLD AUTO: 255 K/UL (ref 150–450)
PMV BLD AUTO: 10.2 FL (ref 9.4–12.3)
POTASSIUM SERPL-SCNC: 4.7 MMOL/L (ref 3.5–5.1)
PROT SERPL-MCNC: 6.2 G/DL (ref 6.3–8.2)
RBC # BLD AUTO: 3.71 M/UL (ref 4.05–5.2)
SODIUM SERPL-SCNC: 136 MMOL/L (ref 136–145)
WBC # BLD AUTO: 8.1 K/UL (ref 4.3–11.1)

## 2024-05-14 PROCEDURE — 85025 COMPLETE CBC W/AUTO DIFF WBC: CPT

## 2024-05-14 PROCEDURE — 1090F PRES/ABSN URINE INCON ASSESS: CPT | Performed by: INTERNAL MEDICINE

## 2024-05-14 PROCEDURE — 3078F DIAST BP <80 MM HG: CPT | Performed by: INTERNAL MEDICINE

## 2024-05-14 PROCEDURE — 99215 OFFICE O/P EST HI 40 MIN: CPT | Performed by: INTERNAL MEDICINE

## 2024-05-14 PROCEDURE — 1036F TOBACCO NON-USER: CPT | Performed by: INTERNAL MEDICINE

## 2024-05-14 PROCEDURE — G8427 DOCREV CUR MEDS BY ELIG CLIN: HCPCS | Performed by: INTERNAL MEDICINE

## 2024-05-14 PROCEDURE — G8400 PT W/DXA NO RESULTS DOC: HCPCS | Performed by: INTERNAL MEDICINE

## 2024-05-14 PROCEDURE — 3074F SYST BP LT 130 MM HG: CPT | Performed by: INTERNAL MEDICINE

## 2024-05-14 PROCEDURE — 36415 COLL VENOUS BLD VENIPUNCTURE: CPT

## 2024-05-14 PROCEDURE — 3017F COLORECTAL CA SCREEN DOC REV: CPT | Performed by: INTERNAL MEDICINE

## 2024-05-14 PROCEDURE — 82728 ASSAY OF FERRITIN: CPT

## 2024-05-14 PROCEDURE — 80053 COMPREHEN METABOLIC PANEL: CPT

## 2024-05-14 PROCEDURE — G8417 CALC BMI ABV UP PARAM F/U: HCPCS | Performed by: INTERNAL MEDICINE

## 2024-05-14 PROCEDURE — 1123F ACP DISCUSS/DSCN MKR DOCD: CPT | Performed by: INTERNAL MEDICINE

## 2024-05-14 ASSESSMENT — PATIENT HEALTH QUESTIONNAIRE - PHQ9
SUM OF ALL RESPONSES TO PHQ QUESTIONS 1-9: 0
2. FEELING DOWN, DEPRESSED OR HOPELESS: NOT AT ALL
SUM OF ALL RESPONSES TO PHQ QUESTIONS 1-9: 0
SUM OF ALL RESPONSES TO PHQ9 QUESTIONS 1 & 2: 0
SUM OF ALL RESPONSES TO PHQ QUESTIONS 1-9: 0
SUM OF ALL RESPONSES TO PHQ QUESTIONS 1-9: 0
1. LITTLE INTEREST OR PLEASURE IN DOING THINGS: NOT AT ALL

## 2024-05-14 NOTE — PATIENT INSTRUCTIONS
Patient Information from Today's Visit    The members of your [facility] are listed below:    Physician Provider: Julien Spence Medical Oncologist  Advanced Practice Clinician: Haydee Veloz NP  Registered Nurse: N/A  Navigator: N/A  Medical Assistant: Irma ESPINOSA MA and Analia LEE MA  : Charisse ESPINOSA   Supportive Care Services: Penelope VALDIVIA LMSW    Follow Up Instructions:   Lab work looks stable today  We will bring you back in March for a follow up with  and lab work prior.      Treatment Summary has been discussed and given to patient:N/A      Current Labs:   Hospital Outpatient Visit on 05/14/2024   Component Date Value Ref Range Status    Ferritin 05/14/2024 343  8 - 388 NG/ML Final    Sodium 05/14/2024 136  136 - 145 mmol/L Final    Potassium 05/14/2024 4.7  3.5 - 5.1 mmol/L Final    Chloride 05/14/2024 102  98 - 107 mmol/L Final    CO2 05/14/2024 24  20 - 28 mmol/L Final    Anion Gap 05/14/2024 10  9 - 18 mmol/L Final    Glucose 05/14/2024 133 (H)  70 - 99 mg/dL Final    Comment: <70 mg/dL Consistent with, but not fully diagnostic of hypoglycemia.  100 - 125 mg/dL Impaired fasting glucose/consistent with pre-diabetes mellitus.  > 126 mg/dl Fasting glucose consistent with overt diabetes mellitus      BUN 05/14/2024 16  8 - 23 MG/DL Final    Creatinine 05/14/2024 0.80  0.60 - 1.10 MG/DL Final    Est, Glom Filt Rate 05/14/2024 77  >60 ml/min/1.73m2 Final    Comment:    Pediatric calculator link: https://www.kidney.org/professionals/kdoqi/gfr_calculatorped     These results are not intended for use in patients <18 years of age.     eGFR results are calculated without a race factor using  the 2021 CKD-EPI equation. Careful clinical correlation is recommended, particularly when comparing to results calculated using previous equations.  The CKD-EPI equation is less accurate in patients with extremes of muscle mass, extra-renal metabolism of creatinine, excessive creatine ingestion, or

## 2024-05-14 NOTE — PROGRESS NOTES
[place] [id number]  Phone: [phone]           5/14/2024  Christi Garibay  1950  [id number]         Christi Garibay is a 73 year old  female who has returned to my clinic for a follow-up visit; she was initially referred to me by Dr. Darrick Lopez for evaluation of Anemia, her Myeloid Molecular Profile was unremarkable, she received parenteral Iron infusions in 10/2021, she does not want to undergo an EGD or a Colonoscopy at the present time.        ALLERGIES:     Azithromycin.        FAMILY HISTORY:    No hematologic disorders.        SOCIAL HISTORY:   She is  and lives with her . She used to work in a textile mill. She denies ever using any tobacco products.        PAST MEDICAL HISTORY:    Anxiety Disorder, Peripheral Vascular Disease, Atrial Fibrillation, s/p AICD, Hypertension, GERD, Hyperlipidemia, Shingles, Coronary Artery Disease, s/p PTCA, Congestive Heart Failure, Osteoarthritis and Diabetes Mellitus.        ROS:  The patient complained of fatigue; all other systems negative.        PHYSICAL EXAM:   The patient was alert, awake and oriented, no acute distress was noted. A left infra-clavicular AICD was noted. Oral examination revealed dentures, no mucosal lesions were seen. Lymph node examination did not reveal any adenopathy. Neck examination revealed a supple neck, no thyromegaly or masses were noted. Chest examination revealed normal vesicular breath sounds. Heart examination revealed S-1 and S-2 with a 2/6 systolic murmur. Abdominal examination revealed a non-tender abdomen, bowel sounds were positive, no organomegaly could be appreciated. Examination of the extremities did not reveal any tenderness or erythema. Examination of the skin did not reveal any lesions.  Medical problems and test results were reviewed with the patient today.        KPS:     80.        LABORATORY INVESTIGATIONS:  CBC showed a WBC

## 2024-05-15 PROCEDURE — 93284 PRGRMG EVAL IMPLANTABLE DFB: CPT | Performed by: INTERNAL MEDICINE

## 2024-06-27 RX ORDER — DOFETILIDE 0.25 MG/1
250 CAPSULE ORAL 2 TIMES DAILY
Qty: 180 CAPSULE | Refills: 3 | Status: SHIPPED | OUTPATIENT
Start: 2024-06-27

## 2024-06-27 NOTE — TELEPHONE ENCOUNTER
MEDICATION REFILL REQUEST          Name of Medication:  dofetilide   Dose:  250 MCG  Frequency:     Quantity:     Days' supply:             Pharmacy Name/Location:  [facility]

## 2024-08-12 ENCOUNTER — OFFICE VISIT (OUTPATIENT)
Age: 74
End: 2024-08-12
Payer: MEDICARE

## 2024-08-12 VITALS
BODY MASS INDEX: 33.16 KG/M2 | SYSTOLIC BLOOD PRESSURE: 126 MMHG | HEIGHT: 62 IN | HEART RATE: 88 BPM | DIASTOLIC BLOOD PRESSURE: 60 MMHG | WEIGHT: 180.2 LBS

## 2024-08-12 DIAGNOSIS — I50.22 CHRONIC SYSTOLIC HEART FAILURE (HCC): ICD-10-CM

## 2024-08-12 DIAGNOSIS — I48.19 PERSISTENT ATRIAL FIBRILLATION (HCC): Primary | ICD-10-CM

## 2024-08-12 DIAGNOSIS — I10 PRIMARY HYPERTENSION: ICD-10-CM

## 2024-08-12 DIAGNOSIS — Z95.810 BIVENTRICULAR IMPLANTABLE CARDIOVERTER-DEFIBRILLATOR IN SITU: ICD-10-CM

## 2024-08-12 PROCEDURE — 3017F COLORECTAL CA SCREEN DOC REV: CPT | Performed by: INTERNAL MEDICINE

## 2024-08-12 PROCEDURE — 1090F PRES/ABSN URINE INCON ASSESS: CPT | Performed by: INTERNAL MEDICINE

## 2024-08-12 PROCEDURE — G8427 DOCREV CUR MEDS BY ELIG CLIN: HCPCS | Performed by: INTERNAL MEDICINE

## 2024-08-12 PROCEDURE — 1036F TOBACCO NON-USER: CPT | Performed by: INTERNAL MEDICINE

## 2024-08-12 PROCEDURE — 99214 OFFICE O/P EST MOD 30 MIN: CPT | Performed by: INTERNAL MEDICINE

## 2024-08-12 PROCEDURE — G8417 CALC BMI ABV UP PARAM F/U: HCPCS | Performed by: INTERNAL MEDICINE

## 2024-08-12 PROCEDURE — G8400 PT W/DXA NO RESULTS DOC: HCPCS | Performed by: INTERNAL MEDICINE

## 2024-08-12 PROCEDURE — 3078F DIAST BP <80 MM HG: CPT | Performed by: INTERNAL MEDICINE

## 2024-08-12 PROCEDURE — 3074F SYST BP LT 130 MM HG: CPT | Performed by: INTERNAL MEDICINE

## 2024-08-12 PROCEDURE — 1123F ACP DISCUSS/DSCN MKR DOCD: CPT | Performed by: INTERNAL MEDICINE

## 2024-08-12 RX ORDER — LOVASTATIN 40 MG/1
40 TABLET ORAL NIGHTLY
COMMUNITY

## 2024-08-12 ASSESSMENT — ENCOUNTER SYMPTOMS
SHORTNESS OF BREATH: 0
SPUTUM PRODUCTION: 0
DIARRHEA: 0
ORTHOPNEA: 0
HEMATEMESIS: 0
ABDOMINAL PAIN: 0
BOWEL INCONTINENCE: 0
HOARSE VOICE: 0
WHEEZING: 0
BLURRED VISION: 0
HEMATOCHEZIA: 0
COLOR CHANGE: 0

## 2024-08-12 NOTE — PROGRESS NOTES
predniSONE (DELTASONE) 5 MG tablet, Take 2 tablets by mouth daily, Disp: , Rfl:     warfarin (COUMADIN) 5 MG tablet, Take 1 tablet by mouth As directed, Disp: , Rfl:     calcium carbonate 600 MG TABS tablet, Take by mouth daily, Disp: , Rfl:     carvedilol (COREG) 12.5 MG tablet, Take 1 tablet by mouth 2 times daily, Disp: 180 tablet, Rfl: 3    vitamin D3 (CHOLECALCIFEROL) 125 MCG (5000 UT) TABS tablet, Take by mouth daily, Disp: , Rfl:     cyanocobalamin 500 MCG tablet, Take 2 tablets by mouth every other day, Disp: , Rfl:     estradiol (ESTRACE) 2 MG tablet, Take 1 tablet by mouth daily, Disp: , Rfl:     fluticasone (FLONASE) 50 MCG/ACT nasal spray, 2 sprays by Nasal route daily PRN, Disp: , Rfl:     loratadine (CLARITIN) 10 MG tablet, Take 1 tablet by mouth, Disp: , Rfl:     LORazepam (ATIVAN) 0.5 MG tablet, Take by mouth every 8 hours as needed., Disp: , Rfl:     magnesium oxide (MAG-OX) 400 MG tablet, Take 1 tablet by mouth 2 times daily, Disp: , Rfl:     montelukast (SINGULAIR) 10 MG tablet, Take 1 tablet by mouth daily, Disp: , Rfl:     nitroGLYCERIN (NITROSTAT) 0.4 MG SL tablet, Place 1 tablet under the tongue, Disp: , Rfl:     omeprazole (PRILOSEC) 40 MG delayed release capsule, Take 1 capsule by mouth daily, Disp: , Rfl:     ondansetron (ZOFRAN-ODT) 4 MG disintegrating tablet, Place 1 tablet under the tongue every 8 hours as needed, Disp: , Rfl:   Allergies   Allergen Reactions    Amiodarone Other (See Comments)     Caused patient to be really sick with heart issues- \"deathly sick\"    Azithromycin Hives and Nausea And Vomiting     Other reaction(s): Lips/Mouth swelling-Allergy    Macrobid [Nitrofurantoin] Nausea And Vomiting    Propoxyphene Hives and Nausea And Vomiting     Other reaction(s): Hives/Swelling-Allergy     Past Medical History:   Diagnosis Date    Acute heart failure with preserved ejection fraction (HCC) 1/13/2023    Anemia     past hx of iron infusions    Anxiety     current    Arthritis

## 2024-12-23 ENCOUNTER — TELEPHONE (OUTPATIENT)
Age: 74
End: 2024-12-23

## 2024-12-23 NOTE — TELEPHONE ENCOUNTER
Pt fell and appears she may have had syncopal episode 12/21/24 between 2 and 4 pm.  PCP advised Alyson to call our office and ask if anything arrhythmias showed up on her device at this time.     Pt did not strike her head with fall. No symptoms now.  Triage forwarding to device clinic.

## 2024-12-23 NOTE — TELEPHONE ENCOUNTER
Triage informed Alyson of Reshma's (RN device clinic) response.   Alyson will check orthostatic VS and have pt increase po water intake.    Pt has had 3 falls in the past 3 weeks. Asking if they need to consider changing or stopping anticoagulant.

## 2024-12-23 NOTE — TELEPHONE ENCOUNTER
Called family to send updated reading. NO episodes were noted. Leads stable. BIV pacing 99%. No correlating device episodes or issues.

## 2024-12-23 NOTE — TELEPHONE ENCOUNTER
Daughter states patient has fallen a few times over the last few weeks, and again last night. When viewing the security footage from last night, she says it appears patient may have passed out causing the fall. She has not gotten any alerts from her defibrillator.  Due to these falls, daughter is concerned about patient being on coumadin and wonders if she needs to switch to something safer.

## 2025-03-04 ENCOUNTER — OFFICE VISIT (OUTPATIENT)
Age: 75
End: 2025-03-04
Payer: MEDICARE

## 2025-03-04 VITALS
BODY MASS INDEX: 33.86 KG/M2 | HEART RATE: 72 BPM | DIASTOLIC BLOOD PRESSURE: 60 MMHG | HEIGHT: 62 IN | WEIGHT: 184 LBS | SYSTOLIC BLOOD PRESSURE: 90 MMHG

## 2025-03-04 DIAGNOSIS — Z95.810 BIVENTRICULAR IMPLANTABLE CARDIOVERTER-DEFIBRILLATOR IN SITU: Primary | ICD-10-CM

## 2025-03-04 DIAGNOSIS — I48.19 PERSISTENT ATRIAL FIBRILLATION (HCC): ICD-10-CM

## 2025-03-04 DIAGNOSIS — I10 PRIMARY HYPERTENSION: ICD-10-CM

## 2025-03-04 PROCEDURE — 3017F COLORECTAL CA SCREEN DOC REV: CPT | Performed by: INTERNAL MEDICINE

## 2025-03-04 PROCEDURE — 1159F MED LIST DOCD IN RCRD: CPT | Performed by: INTERNAL MEDICINE

## 2025-03-04 PROCEDURE — 1123F ACP DISCUSS/DSCN MKR DOCD: CPT | Performed by: INTERNAL MEDICINE

## 2025-03-04 PROCEDURE — 3078F DIAST BP <80 MM HG: CPT | Performed by: INTERNAL MEDICINE

## 2025-03-04 PROCEDURE — 99214 OFFICE O/P EST MOD 30 MIN: CPT | Performed by: INTERNAL MEDICINE

## 2025-03-04 PROCEDURE — 1126F AMNT PAIN NOTED NONE PRSNT: CPT | Performed by: INTERNAL MEDICINE

## 2025-03-04 PROCEDURE — 1090F PRES/ABSN URINE INCON ASSESS: CPT | Performed by: INTERNAL MEDICINE

## 2025-03-04 PROCEDURE — 1160F RVW MEDS BY RX/DR IN RCRD: CPT | Performed by: INTERNAL MEDICINE

## 2025-03-04 PROCEDURE — G8427 DOCREV CUR MEDS BY ELIG CLIN: HCPCS | Performed by: INTERNAL MEDICINE

## 2025-03-04 PROCEDURE — G8417 CALC BMI ABV UP PARAM F/U: HCPCS | Performed by: INTERNAL MEDICINE

## 2025-03-04 PROCEDURE — 3074F SYST BP LT 130 MM HG: CPT | Performed by: INTERNAL MEDICINE

## 2025-03-04 PROCEDURE — 1036F TOBACCO NON-USER: CPT | Performed by: INTERNAL MEDICINE

## 2025-03-04 PROCEDURE — G8400 PT W/DXA NO RESULTS DOC: HCPCS | Performed by: INTERNAL MEDICINE

## 2025-03-04 RX ORDER — ROSUVASTATIN CALCIUM 40 MG/1
40 TABLET, COATED ORAL EVERY EVENING
COMMUNITY
Start: 2025-02-18

## 2025-03-04 ASSESSMENT — ENCOUNTER SYMPTOMS
HOARSE VOICE: 0
DIARRHEA: 0
SHORTNESS OF BREATH: 0
COLOR CHANGE: 0
HEMATEMESIS: 0
SPUTUM PRODUCTION: 0
BOWEL INCONTINENCE: 0
HEMATOCHEZIA: 0
ORTHOPNEA: 0
BLURRED VISION: 0
ABDOMINAL PAIN: 0
WHEEZING: 0

## 2025-03-04 NOTE — PROGRESS NOTES
[facility] CARDIOLOGY  [address] [place] [address], [address]  [place] [address]  PHONE: [phone]        25        NAME:  Christi Garibay  : 1950  MRN: [id number]       SUBJECTIVE:   Christi Garibay is a 74 y.o. female seen for a follow up visit regarding the following: Hx of AF with ablation,ICD,HFrEF,and primary hypertension.She returns for scheduled follow up.She reports worsening fatigue,weakness,and \"shakiness\".    Chief Complaint   Patient presents with    Atrial Fibrillation       HPI:    Atrial Fibrillation  Presents for follow-up visit. Symptoms include dizziness and weakness. Symptoms are negative for an AICD problem, bradycardia, chest pain, hemodynamic instability, hypertension, hypotension, pacemaker problem, palpitations, shortness of breath, syncope and tachycardia.       Past Medical History, Past Surgical History, Family history, Social History, and Medications were all reviewed with the patient today and updated as necessary.         Current Outpatient Medications:     rosuvastatin (CRESTOR) 40 MG tablet, Take 1 tablet by mouth every evening, Disp: , Rfl:     dofetilide (TIKOSYN) 250 MCG capsule, Take 1 capsule by mouth 2 times daily, Disp: 180 capsule, Rfl: 3    Ferrous Sulfate Dried (SLOW IRON PO), Take by mouth, Disp: , Rfl:     lisinopril (PRINIVIL;ZESTRIL) 10 MG tablet, Take 1 tablet by mouth 2 times daily, Disp: 180 tablet, Rfl: 3    FLUoxetine (PROZAC) 20 MG capsule, Take 10 mg by mouth daily, Disp: , Rfl:     furosemide (LASIX) 40 MG tablet, TAKE 1 TABLET EVERY DAY, Disp: 90 tablet, Rfl: 3    predniSONE (DELTASONE) 5 MG tablet, Take 2 tablets by mouth daily, Disp: , Rfl:     warfarin (COUMADIN) 5 MG tablet, Take 1 tablet by mouth As directed, Disp: , Rfl:     calcium carbonate 600 MG TABS tablet, Take by mouth daily, Disp: , Rfl:     carvedilol (COREG) 12.5 MG tablet, Take 1 tablet by mouth 2 times daily, Disp: 180 tablet, Rfl: 3    vitamin D3 (CHOLECALCIFEROL)

## 2025-03-17 DIAGNOSIS — D50.8 OTHER IRON DEFICIENCY ANEMIA: Primary | ICD-10-CM

## 2025-03-19 ENCOUNTER — HOSPITAL ENCOUNTER (OUTPATIENT)
Dept: LAB | Age: 75
Discharge: HOME | End: 2025-03-19
Payer: MEDICARE

## 2025-03-19 ENCOUNTER — OFFICE VISIT (OUTPATIENT)
Dept: ONCOLOGY | Age: 75
End: 2025-03-19

## 2025-03-19 VITALS
DIASTOLIC BLOOD PRESSURE: 81 MMHG | RESPIRATION RATE: 16 BRPM | WEIGHT: 185.5 LBS | HEART RATE: 74 BPM | OXYGEN SATURATION: 95 % | BODY MASS INDEX: 34.14 KG/M2 | TEMPERATURE: 98.8 F | SYSTOLIC BLOOD PRESSURE: 130 MMHG | HEIGHT: 62 IN

## 2025-03-19 DIAGNOSIS — D50.8 OTHER IRON DEFICIENCY ANEMIA: Primary | ICD-10-CM

## 2025-03-19 DIAGNOSIS — K21.9 CHRONIC GERD: ICD-10-CM

## 2025-03-19 DIAGNOSIS — D50.8 OTHER IRON DEFICIENCY ANEMIA: ICD-10-CM

## 2025-03-19 DIAGNOSIS — E78.49 OTHER HYPERLIPIDEMIA: ICD-10-CM

## 2025-03-19 LAB
ALBUMIN SERPL-MCNC: 3.6 G/DL (ref 3.2–4.6)
ALBUMIN/GLOB SERPL: 1.2 (ref 1–1.9)
ALP SERPL-CCNC: 81 U/L (ref 35–104)
ALT SERPL-CCNC: 22 U/L (ref 8–45)
ANION GAP SERPL CALC-SCNC: 13 MMOL/L (ref 7–16)
AST SERPL-CCNC: 28 U/L (ref 15–37)
BASOPHILS # BLD: 0.05 K/UL (ref 0–0.2)
BASOPHILS NFR BLD: 0.6 % (ref 0–2)
BILIRUB SERPL-MCNC: 0.2 MG/DL (ref 0–1.2)
BUN SERPL-MCNC: 15 MG/DL (ref 8–23)
CALCIUM SERPL-MCNC: 9.4 MG/DL (ref 8.8–10.2)
CHLORIDE SERPL-SCNC: 100 MMOL/L (ref 98–107)
CO2 SERPL-SCNC: 24 MMOL/L (ref 20–29)
CREAT SERPL-MCNC: 0.97 MG/DL (ref 0.6–1.1)
DIFFERENTIAL METHOD BLD: ABNORMAL
EOSINOPHIL # BLD: 0.03 K/UL (ref 0–0.8)
EOSINOPHIL NFR BLD: 0.4 % (ref 0.5–7.8)
ERYTHROCYTE [DISTWIDTH] IN BLOOD BY AUTOMATED COUNT: 13.5 % (ref 11.9–14.6)
FERRITIN SERPL-MCNC: 237 NG/ML (ref 8–388)
GLOBULIN SER CALC-MCNC: 3 G/DL (ref 2.3–3.5)
GLUCOSE SERPL-MCNC: 132 MG/DL (ref 70–99)
HCT VFR BLD AUTO: 38.6 % (ref 35.8–46.3)
HGB BLD-MCNC: 12.6 G/DL (ref 11.7–15.4)
IMM GRANULOCYTES # BLD AUTO: 0.03 K/UL (ref 0–0.5)
IMM GRANULOCYTES NFR BLD AUTO: 0.4 % (ref 0–5)
LYMPHOCYTES # BLD: 0.58 K/UL (ref 0.5–4.6)
LYMPHOCYTES NFR BLD: 6.9 % (ref 13–44)
MCH RBC QN AUTO: 30.7 PG (ref 26.1–32.9)
MCHC RBC AUTO-ENTMCNC: 32.6 G/DL (ref 31.4–35)
MCV RBC AUTO: 93.9 FL (ref 82–102)
MONOCYTES # BLD: 0.43 K/UL (ref 0.1–1.3)
MONOCYTES NFR BLD: 5.1 % (ref 4–12)
NEUTS SEG # BLD: 7.31 K/UL (ref 1.7–8.2)
NEUTS SEG NFR BLD: 86.6 % (ref 43–78)
NRBC # BLD: 0 K/UL (ref 0–0.2)
PLATELET # BLD AUTO: 274 K/UL (ref 150–450)
PMV BLD AUTO: 10.3 FL (ref 9.4–12.3)
POTASSIUM SERPL-SCNC: 4.7 MMOL/L (ref 3.5–5.1)
PROT SERPL-MCNC: 6.5 G/DL (ref 6.3–8.2)
RBC # BLD AUTO: 4.11 M/UL (ref 4.05–5.2)
SODIUM SERPL-SCNC: 137 MMOL/L (ref 136–145)
WBC # BLD AUTO: 8.4 K/UL (ref 4.3–11.1)

## 2025-03-19 PROCEDURE — 85025 COMPLETE CBC W/AUTO DIFF WBC: CPT

## 2025-03-19 PROCEDURE — 82728 ASSAY OF FERRITIN: CPT

## 2025-03-19 PROCEDURE — 80053 COMPREHEN METABOLIC PANEL: CPT

## 2025-03-19 PROCEDURE — 36415 COLL VENOUS BLD VENIPUNCTURE: CPT

## 2025-03-19 NOTE — PATIENT INSTRUCTIONS
Patient Information from Today's Visit    The members of your [facility] are listed below:    Physician Provider: Julien Spence Medical Oncologist  Advanced Practice Clinician: Haydee Veloz NP  Registered Nurse: Brittanie QUACH   Navigator: N/A  Medical Assistant: Irma ESPINOSA MA and Analia LEE MA  : Lanie ARMAS   Supportive Care Services: Penelope VALDIVIA LMSW    Diagnosis: NATHANIEL      Follow Up Instructions:     -Labs reviewed.   -We will recheck your labs and follow up     Treatment Summary has been discussed and given to patient:N/A      Current Labs:   Hospital Outpatient Visit on 03/19/2025   Component Date Value Ref Range Status    WBC 03/19/2025 8.4  4.3 - 11.1 K/uL Final    RBC 03/19/2025 4.11  4.05 - 5.2 M/uL Final    Hemoglobin 03/19/2025 12.6  11.7 - 15.4 g/dL Final    Hematocrit 03/19/2025 38.6  35.8 - 46.3 % Final    MCV 03/19/2025 93.9  82.0 - 102.0 FL Final    MCH 03/19/2025 30.7  26.1 - 32.9 PG Final    MCHC 03/19/2025 32.6  31.4 - 35.0 g/dL Final    RDW 03/19/2025 13.5  11.9 - 14.6 % Final    Platelets 03/19/2025 274  150 - 450 K/uL Final    MPV 03/19/2025 10.3  9.4 - 12.3 FL Final    nRBC 03/19/2025 0.00  0.0 - 0.2 K/uL Final    **Note: Absolute NRBC parameter is now reported with Hemogram**    Neutrophils % 03/19/2025 86.6 (H)  43.0 - 78.0 % Final    Lymphocytes % 03/19/2025 6.9 (L)  13.0 - 44.0 % Final    Monocytes % 03/19/2025 5.1  4.0 - 12.0 % Final    Eosinophils % 03/19/2025 0.4 (L)  0.5 - 7.8 % Final    Basophils % 03/19/2025 0.6  0.0 - 2.0 % Final    Immature Granulocytes % 03/19/2025 0.4  0.0 - 5.0 % Final    Neutrophils Absolute 03/19/2025 7.31  1.70 - 8.20 K/UL Final    Lymphocytes Absolute 03/19/2025 0.58  0.50 - 4.60 K/UL Final    Monocytes Absolute 03/19/2025 0.43  0.10 - 1.30 K/UL Final    Eosinophils Absolute 03/19/2025 0.03  0.00 - 0.80 K/UL Final    Basophils Absolute 03/19/2025 0.05  0.00 - 0.20 K/UL Final    Immature Granulocytes Absolute 03/19/2025 0.03  0.00 - 0.50 K/UL

## 2025-03-19 NOTE — PROGRESS NOTES
[place] [id number]  Phone: [phone]           3/19/2025  Christi Garibay  1950  [id number]         Christi Garibay is a 74 year old  female who has returned to my clinic for a follow-up visit; she was initially referred to me by Dr. Darrick Lopez for evaluation of Anemia, her Myeloid Molecular Profile was unremarkable, she received parenteral Iron infusions in 10/2021, she does not want to undergo an EGD or a Colonoscopy at the present time.        ALLERGIES:     Azithromycin.        FAMILY HISTORY:    No hematologic disorders.        SOCIAL HISTORY:   She is  and lives with her . She used to work in a textile mill. She denies ever using any tobacco products.        PAST MEDICAL HISTORY:    Anxiety Disorder, Peripheral Vascular Disease, Atrial Fibrillation, s/p AICD, Hypertension, GERD, Hyperlipidemia, Shingles, Coronary Artery Disease, s/p PTCA, Congestive Heart Failure, Osteoarthritis and Diabetes Mellitus.        ROS:  The patient complained of fatigue; all other systems negative.        PHYSICAL EXAM:   The patient was alert, awake and oriented, no acute distress was noted. A left infra-clavicular AICD was noted. Oral examination revealed dentures, no mucosal lesions were seen. Lymph node examination did not reveal any adenopathy. Neck examination revealed a supple neck, no thyromegaly or masses were noted. Chest examination revealed normal vesicular breath sounds. Heart examination revealed S-1 and S-2 with a 2/6 systolic murmur. Abdominal examination revealed a non-tender abdomen, bowel sounds were positive, no organomegaly could be appreciated. Examination of the extremities did not reveal any tenderness or erythema. Examination of the skin did not reveal any lesions.  Medical problems and test results were reviewed with the patient today.        KPS:     80.        LABORATORY INVESTIGATIONS:  CBC showed a WBC

## 2025-05-02 ENCOUNTER — CLINICAL SUPPORT (OUTPATIENT)
Age: 75
End: 2025-05-02
Payer: MEDICARE

## 2025-05-02 DIAGNOSIS — I42.9 SECONDARY CARDIOMYOPATHY (HCC): ICD-10-CM

## 2025-05-02 DIAGNOSIS — I47.29 NSVT (NONSUSTAINED VENTRICULAR TACHYCARDIA) (HCC): Primary | ICD-10-CM

## 2025-05-02 PROCEDURE — 93284 PRGRMG EVAL IMPLANTABLE DFB: CPT | Performed by: INTERNAL MEDICINE

## 2025-06-02 ENCOUNTER — OFFICE VISIT (OUTPATIENT)
Age: 75
End: 2025-06-02
Payer: MEDICARE

## 2025-06-02 VITALS
DIASTOLIC BLOOD PRESSURE: 59 MMHG | WEIGHT: 188 LBS | BODY MASS INDEX: 34.6 KG/M2 | SYSTOLIC BLOOD PRESSURE: 120 MMHG | HEART RATE: 64 BPM | HEIGHT: 62 IN

## 2025-06-02 DIAGNOSIS — I42.8 NICM (NONISCHEMIC CARDIOMYOPATHY) (HCC): ICD-10-CM

## 2025-06-02 DIAGNOSIS — Z95.810 ICD (IMPLANTABLE CARDIOVERTER-DEFIBRILLATOR), BIVENTRICULAR, IN SITU: ICD-10-CM

## 2025-06-02 DIAGNOSIS — I48.19 PERSISTENT ATRIAL FIBRILLATION (HCC): Primary | ICD-10-CM

## 2025-06-02 DIAGNOSIS — I10 PRIMARY HYPERTENSION: ICD-10-CM

## 2025-06-02 PROCEDURE — 1126F AMNT PAIN NOTED NONE PRSNT: CPT | Performed by: INTERNAL MEDICINE

## 2025-06-02 PROCEDURE — 93000 ELECTROCARDIOGRAM COMPLETE: CPT | Performed by: INTERNAL MEDICINE

## 2025-06-02 PROCEDURE — 1036F TOBACCO NON-USER: CPT | Performed by: INTERNAL MEDICINE

## 2025-06-02 PROCEDURE — 3017F COLORECTAL CA SCREEN DOC REV: CPT | Performed by: INTERNAL MEDICINE

## 2025-06-02 PROCEDURE — 3078F DIAST BP <80 MM HG: CPT | Performed by: INTERNAL MEDICINE

## 2025-06-02 PROCEDURE — G8427 DOCREV CUR MEDS BY ELIG CLIN: HCPCS | Performed by: INTERNAL MEDICINE

## 2025-06-02 PROCEDURE — G8417 CALC BMI ABV UP PARAM F/U: HCPCS | Performed by: INTERNAL MEDICINE

## 2025-06-02 PROCEDURE — 3074F SYST BP LT 130 MM HG: CPT | Performed by: INTERNAL MEDICINE

## 2025-06-02 PROCEDURE — 99214 OFFICE O/P EST MOD 30 MIN: CPT | Performed by: INTERNAL MEDICINE

## 2025-06-02 PROCEDURE — 1159F MED LIST DOCD IN RCRD: CPT | Performed by: INTERNAL MEDICINE

## 2025-06-02 PROCEDURE — 1090F PRES/ABSN URINE INCON ASSESS: CPT | Performed by: INTERNAL MEDICINE

## 2025-06-02 PROCEDURE — G8400 PT W/DXA NO RESULTS DOC: HCPCS | Performed by: INTERNAL MEDICINE

## 2025-06-02 PROCEDURE — 1123F ACP DISCUSS/DSCN MKR DOCD: CPT | Performed by: INTERNAL MEDICINE

## 2025-06-02 ASSESSMENT — ENCOUNTER SYMPTOMS
BLURRED VISION: 0
COLOR CHANGE: 0
HEMATOCHEZIA: 0
HOARSE VOICE: 0
SPUTUM PRODUCTION: 0
DIARRHEA: 0
SHORTNESS OF BREATH: 0
ABDOMINAL PAIN: 0
WHEEZING: 0
ORTHOPNEA: 0
HEMATEMESIS: 0
BOWEL INCONTINENCE: 0

## 2025-06-02 NOTE — PROGRESS NOTES
[facility] CARDIOLOGY  [address] [place] [address], [facility] [address]  [place] [address]  PHONE: [phone]        25        NAME:  Christi Garibay  : 1950  MRN: [id number]       SUBJECTIVE:   Christi Garibay is a 74 y.o. female seen for a follow up visit regarding the following: The patient has a history of A-fib with ablation, ICD, heart failure with reduced ejection fraction, and primary hypertension.  On her last visit she reported dizziness.  Exam showed mild hypotension.  Lisinopril was reduced from 10 mg twice a day to 10 mg daily.  She returns for follow-up after medication change.  Overall, she reports feeling better.    Chief Complaint   Patient presents with    Atrial Fibrillation       HPI:    Atrial Fibrillation  Presents for follow-up visit. Symptoms are negative for an AICD problem, bradycardia, chest pain, dizziness, hemodynamic instability, hypertension, hypotension, pacemaker problem, palpitations, shortness of breath, syncope, tachycardia and weakness. The symptoms have been stable.       Past Medical History, Past Surgical History, Family history, Social History, and Medications were all reviewed with the patient today and updated as necessary.         Current Outpatient Medications:     rosuvastatin (CRESTOR) 40 MG tablet, Take 1 tablet by mouth every evening, Disp: , Rfl:     dofetilide (TIKOSYN) 250 MCG capsule, Take 1 capsule by mouth 2 times daily, Disp: 180 capsule, Rfl: 3    Ferrous Sulfate Dried (SLOW IRON PO), Take by mouth, Disp: , Rfl:     lisinopril (PRINIVIL;ZESTRIL) 10 MG tablet, Take 1 tablet by mouth 2 times daily, Disp: 180 tablet, Rfl: 3    FLUoxetine (PROZAC) 20 MG capsule, Take 10 mg by mouth daily, Disp: , Rfl:     furosemide (LASIX) 40 MG tablet, TAKE 1 TABLET EVERY DAY, Disp: 90 tablet, Rfl: 3    predniSONE (DELTASONE) 5 MG tablet, Take 2 tablets by mouth daily, Disp: , Rfl:     warfarin (COUMADIN) 5 MG tablet, Take 1 tablet by mouth As directed, Disp: ,

## 2025-06-11 RX ORDER — DOFETILIDE 0.25 MG/1
250 CAPSULE ORAL 2 TIMES DAILY
Qty: 180 CAPSULE | Refills: 1 | Status: SHIPPED | OUTPATIENT
Start: 2025-06-11

## (undated) DEVICE — SUTURE ABSORBABLE BRAIDED 2-0 CT-1 27 IN UD VICRYL J259H

## (undated) DEVICE — PROGRAMMER SMART COMM W/HANDSET F/SACRAL NRVE STIMULATORS

## (undated) DEVICE — PLASMABLADE X PS210-030S-LIGHT 3.0SL: Brand: PLASMABLADE™ X

## (undated) DEVICE — ADHESIVE SKIN CLSR 0.7ML TOP DERMBND ADV

## (undated) DEVICE — NEUROSTIMULATOR EXT SM LTWT SGL BTTN H2O RESIST WIRELESS

## (undated) DEVICE — NEEDLE HYPO 25GA L1.5IN BLU POLYPR HUB S STL REG BVL STR

## (undated) DEVICE — SUTURE MCRYL SZ 4-0 L27IN ABSRB UD L19MM PS-2 1/2 CIR PRIM Y426H

## (undated) DEVICE — MINOR SPLIT GENERAL: Brand: MEDLINE INDUSTRIES, INC.

## (undated) DEVICE — SUTURE PDS II SZ 2-0 L27IN ABSRB VLT L26MM CT-2 1/2 CIR Z333H

## (undated) DEVICE — SOLUTION IRRIG 1000ML STRL H2O USP PLAS POUR BTL

## (undated) DEVICE — DRESSING POSTOP AG PRISMASEAL 3.5X6IN

## (undated) DEVICE — Device

## (undated) DEVICE — DRAPE PT ISOLATN 130 IN X 96 IN

## (undated) DEVICE — 48" PROBE COVER W/GEL, ULTRASOUND, STERILE: Brand: SITE-RITE

## (undated) DEVICE — 3M™ TEGADERM™ TRANSPARENT FILM DRESSING FRAME STYLE, 1624W, 2-3/8 IN X 2-3/4 IN (6 CM X 7 CM), 100/CT 4CT/CASE: Brand: 3M™ TEGADERM™

## (undated) DEVICE — SUTURE V-LOC 90 3-0 L9IN ABSRB VLT L26MM V-20 1/2 CIR TAPR VLOCM0644

## (undated) DEVICE — LEAD NERVE STIM 4.32 MM INTERSTIM SURESCAN

## (undated) DEVICE — C-ARM: Brand: UNBRANDED